# Patient Record
Sex: FEMALE | Race: BLACK OR AFRICAN AMERICAN | Employment: OTHER | URBAN - METROPOLITAN AREA
[De-identification: names, ages, dates, MRNs, and addresses within clinical notes are randomized per-mention and may not be internally consistent; named-entity substitution may affect disease eponyms.]

---

## 2022-04-07 ENCOUNTER — HOSPITAL ENCOUNTER (EMERGENCY)
Age: 77
Discharge: HOME | End: 2022-04-07
Attending: EMERGENCY MEDICINE
Payer: MEDICARE

## 2022-04-07 VITALS
DIASTOLIC BLOOD PRESSURE: 71 MMHG | HEIGHT: 62 IN | RESPIRATION RATE: 18 BRPM | TEMPERATURE: 99.1 F | HEART RATE: 62 BPM | OXYGEN SATURATION: 97 % | SYSTOLIC BLOOD PRESSURE: 126 MMHG | BODY MASS INDEX: 32.2 KG/M2 | WEIGHT: 175 LBS

## 2022-04-07 DIAGNOSIS — N30.01 ACUTE CYSTITIS WITH HEMATURIA: Primary | ICD-10-CM

## 2022-04-07 LAB
APPEARANCE UR: ABNORMAL
BACTERIA URNS QL MICRO: ABNORMAL /HPF
BILIRUB UR QL: NEGATIVE
CASTS URNS QL MICRO: 0 /LPF
COLOR UR: YELLOW
CRYSTALS URNS QL MICRO: 0 /LPF
EPI CELLS #/AREA URNS HPF: ABNORMAL /HPF
GLUCOSE UR STRIP.AUTO-MCNC: NEGATIVE MG/DL
HGB UR QL STRIP: ABNORMAL
KETONES UR QL STRIP.AUTO: NEGATIVE MG/DL
LEUKOCYTE ESTERASE UR QL STRIP.AUTO: ABNORMAL
MUCOUS THREADS URNS QL MICRO: 0 /LPF
NITRITE UR QL STRIP.AUTO: NEGATIVE
PH UR STRIP: 7.5 [PH] (ref 5–9)
PROT UR STRIP-MCNC: 100 MG/DL
RBC #/AREA URNS HPF: ABNORMAL /HPF
SP GR UR REFRACTOMETRY: 1.01 (ref 1–1.02)
UROBILINOGEN UR QL STRIP.AUTO: 1 EU/DL (ref 0.2–1)
WBC URNS QL MICRO: >100 /HPF

## 2022-04-07 PROCEDURE — 87186 SC STD MICRODIL/AGAR DIL: CPT

## 2022-04-07 PROCEDURE — 81015 MICROSCOPIC EXAM OF URINE: CPT

## 2022-04-07 PROCEDURE — 99283 EMERGENCY DEPT VISIT LOW MDM: CPT

## 2022-04-07 PROCEDURE — 81001 URINALYSIS AUTO W/SCOPE: CPT

## 2022-04-07 PROCEDURE — 87086 URINE CULTURE/COLONY COUNT: CPT

## 2022-04-07 PROCEDURE — 87088 URINE BACTERIA CULTURE: CPT

## 2022-04-07 RX ORDER — CEFDINIR 300 MG/1
300 CAPSULE ORAL 2 TIMES DAILY
Qty: 14 CAPSULE | Refills: 0 | Status: SHIPPED | OUTPATIENT
Start: 2022-04-07 | End: 2022-04-14

## 2022-04-07 NOTE — ED TRIAGE NOTES
Per daughter pt has had blood and clots in urine since last night. Pt denies any burning with urination, no abd pain, and no pelvic pain.

## 2022-04-07 NOTE — ED PROVIDER NOTES
72-year-old lady presents with concerns about blood in her urine. Patient says she does not have any significant pain in her abdomen or pelvis and no significant pain with urination. She denies any vaginal or rectal bleeding. She has no fevers or chills and no nausea, vomiting, or diarrhea. Daughter says the patient does have a history of some prior UTIs. No other associated symptoms. Elements of this note were created using speech recognition software. As such, errors of speech recognition may be present. Past Medical History:   Diagnosis Date    CAD (coronary artery disease)     Diabetes ([address]     Hypertension     Other ill-defined conditions(799.89)     choelsterol    Stroke [facility])        Past Surgical History:   Procedure Laterality Date    DE CARDIAC SURG PROCEDURE UNLIST      stented - doesn't know date         History reviewed. No pertinent family history. Social History     Socioeconomic History    Marital status: LEGALLY      Spouse name: Not on file    Number of children: Not on file    Years of education: Not on file    Highest education level: Not on file   Occupational History    Not on file   Tobacco Use    Smoking status: Not on file    Smokeless tobacco: Not on file   Substance and Sexual Activity    Alcohol use: Not on file    Drug use: Not on file    Sexual activity: Not on file   Other Topics Concern    Not on file   Social History Narrative    Not on file     Social Determinants of Health     Financial Resource Strain:     Difficulty of Paying Living Expenses: Not on file   Food Insecurity:     Worried About Running Out of Food in the Last Year: Not on file    [place] of Food in the Last Year: Not on file   Transportation Needs:     Lack of Transportation (Medical): Not on file    Lack of Transportation (Non-Medical):  Not on file   Physical Activity:     Days of Exercise per Week: Not on file    Minutes of Exercise per Session: Not on file   Stress:     Feeling of Stress : Not on file   Social Connections:     Frequency of Communication with Friends and Family: Not on file    Frequency of Social Gatherings with Friends and Family: Not on file    Attends Zoroastrian Services: Not on file    Active Member of Clubs or Organizations: Not on file    Attends Club or Organization Meetings: Not on file    Marital Status: Not on file   Intimate Partner Violence:     Fear of Current or Ex-Partner: Not on file    Emotionally Abused: Not on file    Physically Abused: Not on file    Sexually Abused: Not on file   Housing Stability:     Unable to Pay for Housing in the Last Year: Not on file    Number of [place] in the Last Year: Not on file    Unstable Housing in the Last Year: Not on file         ALLERGIES: Codeine and Sulfa (sulfonamide antibiotics)    Review of Systems   Constitutional: Negative for chills and fever. Gastrointestinal: Negative for anal bleeding, diarrhea, nausea and vomiting. Genitourinary: Positive for hematuria. Negative for difficulty urinating and dysuria. Musculoskeletal: Negative for back pain. Psychiatric/Behavioral: Negative for agitation and confusion. Vitals:    04/07/22 1537   BP: 126/71   Pulse: 62   Resp: 18   Temp: 99.1 °F (37.3 °C)   SpO2: 97%   Weight: 79.4 kg (175 lb)   Height: 5' 2\" (1.575 m)            Physical Exam  Vitals and nursing note reviewed. Constitutional:       Appearance: Normal appearance. Abdominal:      General: Bowel sounds are normal.      Palpations: Abdomen is soft. Tenderness: There is no guarding or rebound. Neurological:      General: No focal deficit present. Mental Status: She is alert and oriented to person, place, and time. Mental status is at baseline. [facility]  ED Course as of 04/07/22 1638   Thu Apr 07, 2022   1636 Patient's urine does show signs of likely infection. I will treat with antibiotics and send for urine culture.   I will encourage her to follow-up with her primary care doctor for reevaluation next week.  [AC]      ED Course User Index  [AC] Jeremías Perez MD       Procedures

## 2022-04-07 NOTE — DISCHARGE INSTRUCTIONS
Return with any fevers, worsening bleeding, vomiting, or additional concerns. Follow-up with your primary care doctor in 5 to 7 days for reevaluation.

## 2022-04-10 LAB
BACTERIA SPEC CULT: ABNORMAL
BACTERIA SPEC CULT: ABNORMAL
SERVICE CMNT-IMP: ABNORMAL

## 2022-04-10 NOTE — PROGRESS NOTES
ED pharmacist reviewed recent results of urine culture. The patient was not treated with antibiotics in the emergency department and received a prescription for cefdinir 300mg BID x7d upon discharge. Based on culture results, the patient is being adequately treated for the identified infection with existing antimicrobial therapy. No further intervention needed.     Allergies as of 04/07/2022 - Fully Reviewed 04/07/2022   Allergen Reaction Noted    Codeine Nausea and Vomiting 11/29/2010    Sulfa (sulfonamide antibiotics) Nausea and Vomiting 11/29/2010     Alondra Fuentes, [place]  Emergency Medicine Clinical Pharmacist

## 2022-05-28 ENCOUNTER — HOSPITAL ENCOUNTER (EMERGENCY)
Age: 77
Discharge: HOME | End: 2022-05-28
Attending: EMERGENCY MEDICINE
Payer: MEDICARE

## 2022-05-28 ENCOUNTER — HOSPITAL ENCOUNTER (EMERGENCY)
Dept: CT IMAGING | Age: 77
Discharge: HOME | End: 2022-05-31
Payer: MEDICARE

## 2022-05-28 VITALS
HEART RATE: 75 BPM | DIASTOLIC BLOOD PRESSURE: 61 MMHG | HEIGHT: 62 IN | RESPIRATION RATE: 18 BRPM | WEIGHT: 190 LBS | SYSTOLIC BLOOD PRESSURE: 124 MMHG | BODY MASS INDEX: 34.96 KG/M2 | TEMPERATURE: 98.8 F | OXYGEN SATURATION: 95 %

## 2022-05-28 DIAGNOSIS — K59.00 CONSTIPATION, UNSPECIFIED CONSTIPATION TYPE: ICD-10-CM

## 2022-05-28 DIAGNOSIS — R10.84 GENERALIZED ABDOMINAL PAIN: Primary | ICD-10-CM

## 2022-05-28 LAB
ALBUMIN SERPL-MCNC: 2.5 G/DL (ref 3.2–4.6)
ALBUMIN/GLOB SERPL: 0.5 {RATIO} (ref 1.2–3.5)
ALP SERPL-CCNC: 55 U/L (ref 50–130)
ALT SERPL-CCNC: 36 U/L (ref 12–65)
ANION GAP SERPL CALC-SCNC: 8 MMOL/L (ref 7–16)
AST SERPL-CCNC: 35 U/L (ref 15–37)
BASOPHILS # BLD: 0.1 K/UL (ref 0–0.2)
BASOPHILS NFR BLD: 1 % (ref 0–2)
BILIRUB SERPL-MCNC: 0.9 MG/DL (ref 0.2–1.1)
BUN SERPL-MCNC: 26 MG/DL (ref 8–23)
CALCIUM SERPL-MCNC: 8.7 MG/DL (ref 8.3–10.4)
CHLORIDE SERPL-SCNC: 104 MMOL/L (ref 98–107)
CO2 SERPL-SCNC: 28 MMOL/L (ref 21–32)
CREAT SERPL-MCNC: 1.74 MG/DL (ref 0.6–1)
DIFFERENTIAL METHOD BLD: ABNORMAL
EOSINOPHIL # BLD: 0.4 K/UL (ref 0–0.8)
EOSINOPHIL NFR BLD: 4 % (ref 0.5–7.8)
ERYTHROCYTE [DISTWIDTH] IN BLOOD BY AUTOMATED COUNT: 14.5 % (ref 11.9–14.6)
GLOBULIN SER CALC-MCNC: 5.3 G/DL (ref 2.3–3.5)
GLUCOSE SERPL-MCNC: 153 MG/DL (ref 65–100)
HCT VFR BLD AUTO: 29.9 % (ref 35.8–46.3)
HGB BLD-MCNC: 9.3 G/DL (ref 11.7–15.4)
IMM GRANULOCYTES # BLD AUTO: 0 K/UL (ref 0–0.5)
IMM GRANULOCYTES NFR BLD AUTO: 0 % (ref 0–5)
LIPASE SERPL-CCNC: 214 U/L (ref 73–393)
LYMPHOCYTES # BLD: 3.4 K/UL (ref 0.5–4.6)
LYMPHOCYTES NFR BLD: 39 % (ref 13–44)
MCH RBC QN AUTO: 27.4 PG (ref 26.1–32.9)
MCHC RBC AUTO-ENTMCNC: 31.1 G/DL (ref 31.4–35)
MCV RBC AUTO: 88.2 FL (ref 79.6–97.8)
MONOCYTES # BLD: 0.9 K/UL (ref 0.1–1.3)
MONOCYTES NFR BLD: 10 % (ref 4–12)
NEUTS SEG # BLD: 4.1 K/UL (ref 1.7–8.2)
NEUTS SEG NFR BLD: 46 % (ref 43–78)
NRBC # BLD: 0 K/UL (ref 0–0.2)
PLATELET # BLD AUTO: 337 K/UL (ref 150–450)
PMV BLD AUTO: 8.4 FL (ref 9.4–12.3)
POTASSIUM SERPL-SCNC: 3.7 MMOL/L (ref 3.5–5.1)
PROT SERPL-MCNC: 7.8 G/DL (ref 6.3–8.2)
RBC # BLD AUTO: 3.39 M/UL (ref 4.05–5.2)
SODIUM SERPL-SCNC: 140 MMOL/L (ref 136–145)
WBC # BLD AUTO: 8.9 K/UL (ref 4.3–11.1)

## 2022-05-28 PROCEDURE — 83690 ASSAY OF LIPASE: CPT

## 2022-05-28 PROCEDURE — 96374 THER/PROPH/DIAG INJ IV PUSH: CPT

## 2022-05-28 PROCEDURE — 80053 COMPREHEN METABOLIC PANEL: CPT

## 2022-05-28 PROCEDURE — 99284 EMERGENCY DEPT VISIT MOD MDM: CPT

## 2022-05-28 PROCEDURE — 6370000000 HC RX 637 (ALT 250 FOR IP): Performed by: PHYSICIAN ASSISTANT

## 2022-05-28 PROCEDURE — 6360000002 HC RX W HCPCS: Performed by: PHYSICIAN ASSISTANT

## 2022-05-28 PROCEDURE — 2580000003 HC RX 258: Performed by: PHYSICIAN ASSISTANT

## 2022-05-28 PROCEDURE — 85025 COMPLETE CBC W/AUTO DIFF WBC: CPT

## 2022-05-28 PROCEDURE — 6360000004 HC RX CONTRAST MEDICATION: Performed by: PHYSICIAN ASSISTANT

## 2022-05-28 RX ORDER — LISINOPRIL 40 MG/1
40 TABLET ORAL DAILY
Status: ON HOLD | COMMUNITY
End: 2022-06-22 | Stop reason: HOSPADM

## 2022-05-28 RX ORDER — SIMVASTATIN 80 MG
80 TABLET ORAL
Status: ON HOLD | COMMUNITY
End: 2022-06-22 | Stop reason: HOSPADM

## 2022-05-28 RX ORDER — ATORVASTATIN CALCIUM 40 MG/1
TABLET, FILM COATED ORAL
COMMUNITY

## 2022-05-28 RX ORDER — ONDANSETRON 4 MG/1
4 TABLET, ORALLY DISINTEGRATING ORAL 3 TIMES DAILY PRN
COMMUNITY
Start: 2020-07-21

## 2022-05-28 RX ORDER — CIPROFLOXACIN 250 MG/1
TABLET, FILM COATED ORAL
Status: ON HOLD | COMMUNITY
End: 2022-06-22 | Stop reason: HOSPADM

## 2022-05-28 RX ORDER — LOVASTATIN 40 MG/1
TABLET ORAL
Status: ON HOLD | COMMUNITY
End: 2022-06-22 | Stop reason: HOSPADM

## 2022-05-28 RX ORDER — 0.9 % SODIUM CHLORIDE 0.9 %
500 INTRAVENOUS SOLUTION INTRAVENOUS
Status: COMPLETED | OUTPATIENT
Start: 2022-05-28 | End: 2022-05-28

## 2022-05-28 RX ORDER — HYDROCODONE BITARTRATE AND HOMATROPINE METHYLBROMIDE ORAL SOLUTION 5; 1.5 MG/5ML; MG/5ML
5 LIQUID ORAL 4 TIMES DAILY PRN
COMMUNITY
Start: 2011-10-19

## 2022-05-28 RX ORDER — CLOPIDOGREL BISULFATE 75 MG/1
TABLET ORAL
COMMUNITY

## 2022-05-28 RX ORDER — METHYLPREDNISOLONE 4 MG/1
TABLET ORAL
Status: ON HOLD | COMMUNITY
Start: 2011-10-19 | End: 2022-06-22 | Stop reason: HOSPADM

## 2022-05-28 RX ORDER — AMLODIPINE BESYLATE 5 MG/1
5 TABLET ORAL DAILY
COMMUNITY

## 2022-05-28 RX ORDER — INSULIN GLARGINE 100 [IU]/ML
13 INJECTION, SOLUTION SUBCUTANEOUS
COMMUNITY
Start: 2020-07-03

## 2022-05-28 RX ORDER — LORATADINE 10 MG/1
TABLET ORAL
COMMUNITY

## 2022-05-28 RX ORDER — SODIUM CHLORIDE 0.9 % (FLUSH) 0.9 %
3 SYRINGE (ML) INJECTION EVERY 8 HOURS
Status: DISCONTINUED | OUTPATIENT
Start: 2022-05-28 | End: 2022-05-28 | Stop reason: HOSPADM

## 2022-05-28 RX ORDER — FLUCONAZOLE 150 MG/1
TABLET ORAL
Status: ON HOLD | COMMUNITY
End: 2022-06-22 | Stop reason: HOSPADM

## 2022-05-28 RX ORDER — METOPROLOL TARTRATE 100 MG/1
100 TABLET ORAL 2 TIMES DAILY
Status: ON HOLD | COMMUNITY
End: 2022-06-22 | Stop reason: HOSPADM

## 2022-05-28 RX ORDER — GLYBURIDE 5 MG/1
15 TABLET ORAL
COMMUNITY
End: 2022-05-28

## 2022-05-28 RX ORDER — LOSARTAN POTASSIUM 50 MG/1
TABLET ORAL
COMMUNITY

## 2022-05-28 RX ORDER — ONDANSETRON 2 MG/ML
4 INJECTION INTRAMUSCULAR; INTRAVENOUS
Status: COMPLETED | OUTPATIENT
Start: 2022-05-28 | End: 2022-05-28

## 2022-05-28 RX ORDER — POLYETHYLENE GLYCOL 3350 17 G/17G
17 POWDER, FOR SOLUTION ORAL DAILY
Qty: 765 G | Refills: 0 | Status: SHIPPED | OUTPATIENT
Start: 2022-05-28 | End: 2022-06-04

## 2022-05-28 RX ORDER — ASPIRIN 81 MG/1
81 TABLET ORAL 2 TIMES DAILY
COMMUNITY
Start: 2021-05-18

## 2022-05-28 RX ORDER — HYDROCHLOROTHIAZIDE 25 MG/1
TABLET ORAL
COMMUNITY

## 2022-05-28 RX ORDER — CARVEDILOL 6.25 MG/1
TABLET ORAL
COMMUNITY
Start: 2020-07-03

## 2022-05-28 RX ORDER — ATORVASTATIN CALCIUM 80 MG/1
TABLET, FILM COATED ORAL
COMMUNITY

## 2022-05-28 RX ORDER — ACETAMINOPHEN 500 MG
500 TABLET ORAL EVERY 6 HOURS PRN
COMMUNITY
Start: 2020-07-03

## 2022-05-28 RX ADMIN — DIATRIZOATE MEGLUMINE AND DIATRIZOATE SODIUM 15 ML: 660; 100 LIQUID ORAL; RECTAL at 13:41

## 2022-05-28 RX ADMIN — HYOSCYAMINE SULFATE 250 MCG: 0.12 TABLET ORAL; SUBLINGUAL at 11:29

## 2022-05-28 RX ADMIN — SODIUM CHLORIDE 500 ML: 900 INJECTION, SOLUTION INTRAVENOUS at 12:13

## 2022-05-28 RX ADMIN — ONDANSETRON 4 MG: 2 INJECTION INTRAMUSCULAR; INTRAVENOUS at 11:54

## 2022-05-28 ASSESSMENT — PAIN - FUNCTIONAL ASSESSMENT: PAIN_FUNCTIONAL_ASSESSMENT: NONE - DENIES PAIN

## 2022-05-28 NOTE — ED NOTES
I have reviewed discharge instructions with the patient. The patient verbalized understanding. Patient left ED via Discharge Method: ambulatory to Home with family  Opportunity for questions and clarification provided. Patient given 0 scripts. To continue your aftercare when you leave the hospital, you may receive an automated call from our care team to check in on how you are doing. This is a free service and part of our promise to provide the best care and service to meet your aftercare needs.  If you have questions, or wish to unsubscribe from this service please call [phone]. Thank you for Choosing our [facility] Emergency Department.       Arnulfo Hughes RN  05/28/22 8831

## 2022-05-28 NOTE — ED NOTES
I have reviewed discharge instructions with the patient and daughter. The daughter and patient verbalized understanding. Patient left ED via private vehicle. Discharge Method: wheelchair to Home with daughter. Opportunity for questions and clarification provided. Patient given 1 scripts. To continue your aftercare when you leave the hospital, you may receive an automated call from our care team to check in on how you are doing. This is a free service and part of our promise to provide the best care and service to meet your aftercare needs.  If you have questions, or wish to unsubscribe from this service please call [phone]. Thank you for Choosing our [facility] Emergency Department.       Macey Mojica RN  05/28/22 6330

## 2022-05-28 NOTE — ED TRIAGE NOTES
Patient brought to the ER by daughter. Patient c\o constipation. Last bowel movement was 5/25. Daughter states that patient was vomiting and lower abdominal pain.  Denies fever

## 2022-05-28 NOTE — ED PROVIDER NOTES
[facility] Emergency Department Provider Note                     PCP:                Quintin Alvarado DO               Age: 78 y.o. Sex: female           ICD-10-CM    1. Generalized abdominal pain  R10.84    2. Constipation, unspecified constipation type  K59.00        DISCHARGED    MDM  Number of Diagnoses or Management Options  Constipation, unspecified constipation type  Generalized abdominal pain  Diagnosis management comments: Patient is a 79-year-old female who presented to the facility today with complaint of constipation and abdominal pain. On exam patient is well-appearing in no acute distress. Vital signs are stable. Physical exam was largely unremarkable with no abdominal tenderness to palpation diffusely throughout the abdomen. Basic labs in the department continue to show trending kidney disease as well as an H&H of 9.3 and 29.9 respectively. We tried to give the patient an enema in the department while awaiting to obtain a CT and the patient did not tolerate well getting minimal amount of the enema successfully into the rectum. When I went back to talk to the patient regarding CT scan she indicated she no longer wanted anything done and did not want the CT scan. I discussed my concern that she had not had a bowel movement in 4 days and that it may be necessary to do a rectal exam to see if she is impacted which she refused. I discussed the concern that if she was obstructed or impacted that things may continue to get worse and there were significant potential risks (I.e. bowel perforation) to not resolving and/or finding the problem. Patient reports understanding but still refuses to have anything further done here in the department and would like to go home. As the patient was getting ready for discharge the nurse came and inform me that she had had a significant bowel movement all over herself in the bed and the exam room.   I have sent the patient home with a prescription for MiraLAX and instructed her how to take it until she gets continuous bowel movements. Discussed return protocols with the patient which she reports understanding. Family no longer in the room to review this with them. Patient was in stable condition upon discharge. Voice dictation software was used during the making of this note. This software is not perfect and grammatical and other typographical errors may be present. This note has been proofread, but may still contain errors. Chidi Candelario PA-C; 5/29/2022 @8:31 AM   ===================================================================         Amount and/or Complexity of Data Reviewed  Clinical lab tests: reviewed and ordered  Tests in the medicine section of CPT®: ordered and reviewed  Review and summarize past medical records: yes  Independent visualization of images, tracings, or specimens: yes    Risk of Complications, Morbidity, and/or Mortality  Presenting problems: moderate  Diagnostic procedures: moderate  Management options: low  General comments: The patient was observed in the ED.     Results Reviewed:      Recent Results (from the past 24 hour(s))  -CBC with Diff:   Collection Time: 05/28/22 11:28 AM       Result                      Value             Ref Range           WBC                         8.9               4.3 - 11.1 K*       RBC                         3.39 (L)          4.05 - 5.2 M*       Hemoglobin                  9.3 (L)           11.7 - 15.4 *       Hematocrit                  29.9 (L)          35.8 - 46.3 %       MCV                         88.2              79.6 - 97.8 *       MCH                         27.4              26.1 - 32.9 *       MCHC                        31.1 (L)          31.4 - 35.0 *       RDW                         14.5              11.9 - 14.6 %       Platelets                   337               150 - 450 K/*       MPV                         8.4 (L)           9.4 - 12.3 FL       nRBC 0.00              0.0 - 0.2 K/*       Differential Type           AUTOMATED                             Seg Neutrophils             46                43 - 78 %           Lymphocytes                 39                13 - 44 %           Monocytes                   10                4.0 - 12.0 %        Eosinophils %               4                 0.5 - 7.8 %         Basophils                   1                 0.0 - 2.0 %         Immature Granulocytes       0                 0.0 - 5.0 %         Segs Absolute               4.1               1.7 - 8.2 K/*       Absolute Lymph #            3.4               0.5 - 4.6 K/*       Absolute Mono #             0.9               0.1 - 1.3 K/*       Absolute Eos #              0.4               0.0 - 0.8 K/*       Basophils Absolute          0.1               0.0 - 0.2 K/*       Absolute Immature Gran*     0.0               0.0 - 0.5 K/*  -CMP:   Collection Time: 05/28/22 11:28 AM       Result                      Value             Ref Range           Sodium                      140               136 - 145 mm*       Potassium                   3.7               3.5 - 5.1 mm*       Chloride                    104               98 - 107 mmo*       CO2                         28                21 - 32 mmol*       Anion Gap                   8                 7 - 16 mmol/L       Glucose                     153 (H)           65 - 100 mg/*       BUN                         26 (H)            8 - 23 MG/DL        CREATININE                  1. 74 (H)          0.6 - 1.0 MG*       GFR         37 (L)            >60 ml/min/1*       GFR Non- Americ*     30 (L)            >60 ml/min/1*       Calcium                     8.7               8.3 - 10.4 M*       Total Bilirubin             0.9               0.2 - 1.1 MG*       ALT                         36                12 - 65 U/L         AST                         35                15 - 37 U/L         Alk Phosphatase 55                50 - 130 U/L        Total Protein               7.8               6.3 - 8.2 g/*       Albumin                     2.5 (L)           3.2 - 4.6 g/*       Globulin                    5.3 (H)           2.3 - 3.5 g/*       Albumin/Globulin Ratio      0.5 (L)           1.2 - 3.5      -Lipase:   Collection Time: 05/28/22 11:28 AM       Result                      Value             Ref Range           Lipase                      214               73 - 393 U/L     I discussed the results of all labs, procedures, radiographs, and treatments with the patient and available family. Treatment plan is agreed upon and the patient is ready for discharge. All voiced understanding of the discharge plan and medication instructions or changes as appropriate. Questions about treatment in the ED were answered. All were encouraged to return should symptoms worsen or new problems develop. Patient Progress  Patient progress: stable      Orders Placed This Encounter   Procedures    CT ABDOMEN PELVIS WO CONTRAST Additional Contrast? None    CBC with Diff    CMP    Lipase    Diet NPO    POCT Urine Dipstick    ENEMA MILK AND MOLASSES    Saline lock IV        Marlien Britton is a 78 y.o. female who presents to the Emergency Department with chief complaint of    Chief Complaint   Patient presents with    Abdominal Pain      Patient is a 79-year-old female with history of diabetes and chronic kidney disease who presented to the facility today with complaint of constipation since Wednesday. Patient reports she has really been able to have no produce stool at all even small amounts the last few days. She states she has been eating and drinking normally. Daughter in the room reports she was complaining of some abdominal discomfort and that in combination with the constipation is why she brought her in today.   Daughter reports on the way to the emergency room patient had a single episode of vomiting which was about 2 and half hours removed from when she ate breakfast.  Patient denies any fevers, chills, body aches, chest pain, cough, nausea, dysuria, frequency, flank pain, or any other symptoms. The history is provided by the patient. Review of Systems   Constitutional: Negative for chills and fever. Respiratory: Negative for cough and shortness of breath. Cardiovascular: Negative for chest pain. Gastrointestinal: Positive for constipation and vomiting. Negative for abdominal pain and nausea. Genitourinary: Negative for dysuria, frequency and urgency. Musculoskeletal: Negative for back pain and gait problem. Skin: Negative for rash. Neurological: Negative for dizziness, syncope and headaches. Psychiatric/Behavioral: Negative for agitation and behavioral problems. All other systems reviewed and are negative. All other systems reviewed and are negative. @[email or web address]@     @[email or web address]@    @[email or web address]@        Social Connections:     Frequency of Communication with Friends and Family: Not on file    Frequency of Social Gatherings with Friends and Family: Not on file    Attends Roman Catholic Services: Not on file    Active Member of Clubs or Organizations: Not on file    Attends Club or Organization Meetings: Not on file    Marital Status: Not on file        Allergies   Allergen Reactions    Codeine Nausea And Vomiting    Sulfa Antibiotics Nausea And Vomiting and Hives     Daughter reports but unsure of reaction          Vitals signs and nursing note reviewed. Patient Vitals for the past 4 hrs:   Temp Pulse Resp BP SpO2   05/28/22 1115 98.8 °F (37.1 °C) 72 19 123/80 97 %          Physical Exam  Vitals and nursing note reviewed. Constitutional:       General: She is not in acute distress. Appearance: She is well-developed. She is not ill-appearing or toxic-appearing. HENT:      Head: Normocephalic and atraumatic.    Cardiovascular:      Rate and Rhythm: Normal rate and

## 2022-05-29 ASSESSMENT — ENCOUNTER SYMPTOMS
CONSTIPATION: 1
VOMITING: 1
ABDOMINAL PAIN: 0
SHORTNESS OF BREATH: 0
COUGH: 0
NAUSEA: 0
BACK PAIN: 0

## 2022-06-18 ENCOUNTER — HOSPITAL ENCOUNTER (EMERGENCY)
Dept: GENERAL RADIOLOGY | Age: 77
Discharge: HOME | DRG: 871 | End: 2022-06-21
Payer: MEDICARE

## 2022-06-18 ENCOUNTER — HOSPITAL ENCOUNTER (INPATIENT)
Age: 77
LOS: 4 days | Discharge: HOME | DRG: 871 | End: 2022-06-22
Attending: EMERGENCY MEDICINE | Admitting: FAMILY MEDICINE
Payer: MEDICARE

## 2022-06-18 DIAGNOSIS — N39.0 URINARY TRACT INFECTION WITH HEMATURIA, SITE UNSPECIFIED: Primary | ICD-10-CM

## 2022-06-18 DIAGNOSIS — R40.4 TRANSIENT ALTERATION OF AWARENESS: ICD-10-CM

## 2022-06-18 DIAGNOSIS — E86.0 DEHYDRATION: ICD-10-CM

## 2022-06-18 DIAGNOSIS — R31.9 URINARY TRACT INFECTION WITH HEMATURIA, SITE UNSPECIFIED: Primary | ICD-10-CM

## 2022-06-18 DIAGNOSIS — R53.1 GENERALIZED WEAKNESS: ICD-10-CM

## 2022-06-18 DIAGNOSIS — N17.9 ACUTE KIDNEY INJURY SUPERIMPOSED ON CHRONIC KIDNEY DISEASE (HCC): ICD-10-CM

## 2022-06-18 DIAGNOSIS — N18.9 ACUTE KIDNEY INJURY SUPERIMPOSED ON CHRONIC KIDNEY DISEASE (HCC): ICD-10-CM

## 2022-06-18 PROBLEM — D64.9 NORMOCYTIC ANEMIA: Status: ACTIVE | Noted: 2022-06-18

## 2022-06-18 LAB
ALBUMIN SERPL-MCNC: 2.3 G/DL (ref 3.2–4.6)
ALBUMIN/GLOB SERPL: 0.4 {RATIO} (ref 1.2–3.5)
ALP SERPL-CCNC: 53 U/L (ref 50–136)
ALT SERPL-CCNC: 54 U/L (ref 12–65)
ANION GAP SERPL CALC-SCNC: 6 MMOL/L (ref 7–16)
APPEARANCE UR: ABNORMAL
AST SERPL-CCNC: 46 U/L (ref 15–37)
BACTERIA URNS QL MICRO: ABNORMAL /HPF
BASOPHILS # BLD: 0.1 K/UL (ref 0–0.2)
BASOPHILS NFR BLD: 1 % (ref 0–2)
BILIRUB SERPL-MCNC: 0.6 MG/DL (ref 0.2–1.1)
BILIRUB UR QL: NEGATIVE
BUN SERPL-MCNC: 43 MG/DL (ref 8–23)
CALCIUM SERPL-MCNC: 8.7 MG/DL (ref 8.3–10.4)
CHLORIDE SERPL-SCNC: 107 MMOL/L (ref 98–107)
CO2 SERPL-SCNC: 25 MMOL/L (ref 21–32)
COLOR UR: ABNORMAL
CREAT SERPL-MCNC: 1.98 MG/DL (ref 0.6–1)
DIFFERENTIAL METHOD BLD: ABNORMAL
EKG ATRIAL RATE: 67 BPM
EKG DIAGNOSIS: NORMAL
EKG P AXIS: 46 DEGREES
EKG P-R INTERVAL: 174 MS
EKG Q-T INTERVAL: 418 MS
EKG QRS DURATION: 92 MS
EKG QTC CALCULATION (BAZETT): 441 MS
EKG R AXIS: -4 DEGREES
EKG T AXIS: -3 DEGREES
EKG VENTRICULAR RATE: 67 BPM
EOSINOPHIL # BLD: 0.2 K/UL (ref 0–0.8)
EOSINOPHIL NFR BLD: 3 % (ref 0.5–7.8)
EPI CELLS #/AREA URNS HPF: ABNORMAL /HPF
ERYTHROCYTE [DISTWIDTH] IN BLOOD BY AUTOMATED COUNT: 15.1 % (ref 11.9–14.6)
GLOBULIN SER CALC-MCNC: 5.2 G/DL (ref 2.3–3.5)
GLUCOSE SERPL-MCNC: 165 MG/DL (ref 65–100)
GLUCOSE UR STRIP.AUTO-MCNC: NEGATIVE MG/DL
HCT VFR BLD AUTO: 29.9 % (ref 35.8–46.3)
HGB BLD-MCNC: 9.3 G/DL (ref 11.7–15.4)
HGB UR QL STRIP: ABNORMAL
IMM GRANULOCYTES # BLD AUTO: 0 K/UL (ref 0–0.5)
IMM GRANULOCYTES NFR BLD AUTO: 0 % (ref 0–5)
KETONES UR QL STRIP.AUTO: NEGATIVE MG/DL
LACTATE SERPL-SCNC: 1 MMOL/L (ref 0.4–2)
LEUKOCYTE ESTERASE UR QL STRIP.AUTO: ABNORMAL
LYMPHOCYTES # BLD: 5.1 K/UL (ref 0.5–4.6)
LYMPHOCYTES NFR BLD: 52 % (ref 13–44)
MCH RBC QN AUTO: 27.3 PG (ref 26.1–32.9)
MCHC RBC AUTO-ENTMCNC: 31.1 G/DL (ref 31.4–35)
MCV RBC AUTO: 87.7 FL (ref 79.6–97.8)
MONOCYTES # BLD: 1.2 K/UL (ref 0.1–1.3)
MONOCYTES NFR BLD: 13 % (ref 4–12)
NEUTS SEG # BLD: 3.1 K/UL (ref 1.7–8.2)
NEUTS SEG NFR BLD: 32 % (ref 43–78)
NITRITE UR QL STRIP.AUTO: NEGATIVE
NRBC # BLD: 0 K/UL (ref 0–0.2)
OTHER OBSERVATIONS: ABNORMAL
PH UR STRIP: 7.5 [PH] (ref 5–9)
PLATELET # BLD AUTO: 305 K/UL (ref 150–450)
PMV BLD AUTO: 8.6 FL (ref 9.4–12.3)
POTASSIUM SERPL-SCNC: 4.6 MMOL/L (ref 3.5–5.1)
PROT SERPL-MCNC: 7.5 G/DL (ref 6.3–8.2)
PROT UR STRIP-MCNC: 300 MG/DL
RBC # BLD AUTO: 3.41 M/UL (ref 4.05–5.2)
RBC #/AREA URNS HPF: >100 /HPF
SODIUM SERPL-SCNC: 138 MMOL/L (ref 136–145)
SP GR UR REFRACTOMETRY: 1.01 (ref 1–1.02)
UROBILINOGEN UR QL STRIP.AUTO: 0.2 EU/DL (ref 0.2–1)
WBC # BLD AUTO: 9.7 K/UL (ref 4.3–11.1)
WBC URNS QL MICRO: >100 /HPF

## 2022-06-18 PROCEDURE — 80053 COMPREHEN METABOLIC PANEL: CPT

## 2022-06-18 PROCEDURE — 81001 URINALYSIS AUTO W/SCOPE: CPT

## 2022-06-18 PROCEDURE — 1100000000 HC RM PRIVATE

## 2022-06-18 PROCEDURE — 83605 ASSAY OF LACTIC ACID: CPT

## 2022-06-18 PROCEDURE — 96375 TX/PRO/DX INJ NEW DRUG ADDON: CPT

## 2022-06-18 PROCEDURE — 94762 N-INVAS EAR/PLS OXIMTRY CONT: CPT

## 2022-06-18 PROCEDURE — 2580000003 HC RX 258: Performed by: EMERGENCY MEDICINE

## 2022-06-18 PROCEDURE — 87088 URINE BACTERIA CULTURE: CPT

## 2022-06-18 PROCEDURE — 96374 THER/PROPH/DIAG INJ IV PUSH: CPT

## 2022-06-18 PROCEDURE — 87186 SC STD MICRODIL/AGAR DIL: CPT

## 2022-06-18 PROCEDURE — 71045 X-RAY EXAM CHEST 1 VIEW: CPT

## 2022-06-18 PROCEDURE — 99285 EMERGENCY DEPT VISIT HI MDM: CPT

## 2022-06-18 PROCEDURE — 87205 SMEAR GRAM STAIN: CPT

## 2022-06-18 PROCEDURE — 93005 ELECTROCARDIOGRAM TRACING: CPT | Performed by: EMERGENCY MEDICINE

## 2022-06-18 PROCEDURE — 87086 URINE CULTURE/COLONY COUNT: CPT

## 2022-06-18 PROCEDURE — 6360000002 HC RX W HCPCS: Performed by: EMERGENCY MEDICINE

## 2022-06-18 PROCEDURE — 87040 BLOOD CULTURE FOR BACTERIA: CPT

## 2022-06-18 PROCEDURE — 96361 HYDRATE IV INFUSION ADD-ON: CPT

## 2022-06-18 PROCEDURE — 85025 COMPLETE CBC W/AUTO DIFF WBC: CPT

## 2022-06-18 RX ORDER — CLOPIDOGREL BISULFATE 75 MG/1
75 TABLET ORAL DAILY
Status: DISCONTINUED | OUTPATIENT
Start: 2022-06-19 | End: 2022-06-22 | Stop reason: HOSPADM

## 2022-06-18 RX ORDER — ATORVASTATIN CALCIUM 40 MG/1
40 TABLET, FILM COATED ORAL DAILY
Status: DISCONTINUED | OUTPATIENT
Start: 2022-06-19 | End: 2022-06-19 | Stop reason: SDUPTHER

## 2022-06-18 RX ORDER — HEPARIN SODIUM 5000 [USP'U]/ML
5000 INJECTION, SOLUTION INTRAVENOUS; SUBCUTANEOUS EVERY 8 HOURS SCHEDULED
Status: DISCONTINUED | OUTPATIENT
Start: 2022-06-19 | End: 2022-06-22 | Stop reason: HOSPADM

## 2022-06-18 RX ORDER — SODIUM CHLORIDE, SODIUM LACTATE, POTASSIUM CHLORIDE, AND CALCIUM CHLORIDE .6; .31; .03; .02 G/100ML; G/100ML; G/100ML; G/100ML
1000 INJECTION, SOLUTION INTRAVENOUS
Status: COMPLETED | OUTPATIENT
Start: 2022-06-18 | End: 2022-06-18

## 2022-06-18 RX ORDER — SODIUM CHLORIDE 9 MG/ML
INJECTION, SOLUTION INTRAVENOUS CONTINUOUS
Status: DISCONTINUED | OUTPATIENT
Start: 2022-06-19 | End: 2022-06-20

## 2022-06-18 RX ORDER — LISINOPRIL 20 MG/1
40 TABLET ORAL DAILY
Status: DISCONTINUED | OUTPATIENT
Start: 2022-06-19 | End: 2022-06-19

## 2022-06-18 RX ORDER — HYDROCODONE BITARTRATE AND HOMATROPINE METHYLBROMIDE ORAL SOLUTION 5; 1.5 MG/5ML; MG/5ML
5 LIQUID ORAL EVERY 4 HOURS PRN
Status: DISCONTINUED | OUTPATIENT
Start: 2022-06-18 | End: 2022-06-22 | Stop reason: HOSPADM

## 2022-06-18 RX ORDER — AMLODIPINE BESYLATE 5 MG/1
5 TABLET ORAL DAILY
Status: DISCONTINUED | OUTPATIENT
Start: 2022-06-19 | End: 2022-06-22 | Stop reason: HOSPADM

## 2022-06-18 RX ORDER — ACETAMINOPHEN 650 MG/1
650 SUPPOSITORY RECTAL EVERY 6 HOURS PRN
Status: DISCONTINUED | OUTPATIENT
Start: 2022-06-18 | End: 2022-06-22 | Stop reason: HOSPADM

## 2022-06-18 RX ORDER — CETIRIZINE HYDROCHLORIDE 10 MG/1
10 TABLET ORAL DAILY
Status: DISCONTINUED | OUTPATIENT
Start: 2022-06-19 | End: 2022-06-22 | Stop reason: HOSPADM

## 2022-06-18 RX ORDER — ENOXAPARIN SODIUM 100 MG/ML
30 INJECTION SUBCUTANEOUS DAILY
Status: DISCONTINUED | OUTPATIENT
Start: 2022-06-19 | End: 2022-06-18

## 2022-06-18 RX ORDER — SODIUM CHLORIDE 0.9 % (FLUSH) 0.9 %
5-40 SYRINGE (ML) INJECTION EVERY 12 HOURS SCHEDULED
Status: DISCONTINUED | OUTPATIENT
Start: 2022-06-19 | End: 2022-06-22 | Stop reason: HOSPADM

## 2022-06-18 RX ORDER — METOPROLOL TARTRATE 100 MG/1
100 TABLET ORAL 2 TIMES DAILY
Status: DISCONTINUED | OUTPATIENT
Start: 2022-06-19 | End: 2022-06-19

## 2022-06-18 RX ORDER — POLYETHYLENE GLYCOL 3350 17 G/17G
17 POWDER, FOR SOLUTION ORAL DAILY PRN
Status: DISCONTINUED | OUTPATIENT
Start: 2022-06-18 | End: 2022-06-22 | Stop reason: HOSPADM

## 2022-06-18 RX ORDER — DEXTROSE MONOHYDRATE 50 MG/ML
100 INJECTION, SOLUTION INTRAVENOUS PRN
Status: DISCONTINUED | OUTPATIENT
Start: 2022-06-18 | End: 2022-06-22 | Stop reason: HOSPADM

## 2022-06-18 RX ORDER — ATORVASTATIN CALCIUM 40 MG/1
80 TABLET, FILM COATED ORAL NIGHTLY
Status: DISCONTINUED | OUTPATIENT
Start: 2022-06-19 | End: 2022-06-22 | Stop reason: HOSPADM

## 2022-06-18 RX ORDER — ONDANSETRON 4 MG/1
4 TABLET, ORALLY DISINTEGRATING ORAL EVERY 8 HOURS PRN
Status: DISCONTINUED | OUTPATIENT
Start: 2022-06-18 | End: 2022-06-22 | Stop reason: HOSPADM

## 2022-06-18 RX ORDER — MORPHINE SULFATE 4 MG/ML
4 INJECTION INTRAVENOUS
Status: COMPLETED | OUTPATIENT
Start: 2022-06-18 | End: 2022-06-18

## 2022-06-18 RX ORDER — ASPIRIN 81 MG/1
81 TABLET ORAL 2 TIMES DAILY
Status: DISCONTINUED | OUTPATIENT
Start: 2022-06-19 | End: 2022-06-19

## 2022-06-18 RX ORDER — ONDANSETRON 2 MG/ML
4 INJECTION INTRAMUSCULAR; INTRAVENOUS
Status: COMPLETED | OUTPATIENT
Start: 2022-06-18 | End: 2022-06-18

## 2022-06-18 RX ORDER — ACETAMINOPHEN 325 MG/1
650 TABLET ORAL EVERY 6 HOURS PRN
Status: DISCONTINUED | OUTPATIENT
Start: 2022-06-18 | End: 2022-06-22 | Stop reason: HOSPADM

## 2022-06-18 RX ORDER — CARVEDILOL 6.25 MG/1
6.25 TABLET ORAL 2 TIMES DAILY WITH MEALS
Status: DISCONTINUED | OUTPATIENT
Start: 2022-06-19 | End: 2022-06-19

## 2022-06-18 RX ORDER — SODIUM CHLORIDE 0.9 % (FLUSH) 0.9 %
5-40 SYRINGE (ML) INJECTION PRN
Status: DISCONTINUED | OUTPATIENT
Start: 2022-06-18 | End: 2022-06-22 | Stop reason: HOSPADM

## 2022-06-18 RX ORDER — HYDROCHLOROTHIAZIDE 25 MG/1
25 TABLET ORAL DAILY
Status: DISCONTINUED | OUTPATIENT
Start: 2022-06-19 | End: 2022-06-19

## 2022-06-18 RX ORDER — INSULIN GLARGINE 100 [IU]/ML
13 INJECTION, SOLUTION SUBCUTANEOUS NIGHTLY
Status: DISCONTINUED | OUTPATIENT
Start: 2022-06-19 | End: 2022-06-22 | Stop reason: HOSPADM

## 2022-06-18 RX ORDER — ONDANSETRON 2 MG/ML
4 INJECTION INTRAMUSCULAR; INTRAVENOUS EVERY 6 HOURS PRN
Status: DISCONTINUED | OUTPATIENT
Start: 2022-06-18 | End: 2022-06-22 | Stop reason: HOSPADM

## 2022-06-18 RX ORDER — LOSARTAN POTASSIUM 50 MG/1
50 TABLET ORAL DAILY
Status: DISCONTINUED | OUTPATIENT
Start: 2022-06-19 | End: 2022-06-22 | Stop reason: HOSPADM

## 2022-06-18 RX ADMIN — ONDANSETRON 4 MG: 2 INJECTION INTRAMUSCULAR; INTRAVENOUS at 18:37

## 2022-06-18 RX ADMIN — CEFTRIAXONE 1000 MG: 1 INJECTION, POWDER, FOR SOLUTION INTRAMUSCULAR; INTRAVENOUS at 21:33

## 2022-06-18 RX ADMIN — SODIUM CHLORIDE, POTASSIUM CHLORIDE, SODIUM LACTATE AND CALCIUM CHLORIDE 1000 ML: 600; 310; 30; 20 INJECTION, SOLUTION INTRAVENOUS at 18:00

## 2022-06-18 RX ADMIN — MORPHINE SULFATE 4 MG: 4 INJECTION INTRAVENOUS at 18:38

## 2022-06-18 ASSESSMENT — ENCOUNTER SYMPTOMS
COUGH: 0
DIARRHEA: 0
HEMATOCHEZIA: 0
NAUSEA: 0
VISUAL CHANGE: 0
SHORTNESS OF BREATH: 0
VOMITING: 0
ABDOMINAL PAIN: 0

## 2022-06-18 ASSESSMENT — PAIN SCALES - GENERAL
PAINLEVEL_OUTOF10: 10
PAINLEVEL_OUTOF10: 0

## 2022-06-18 ASSESSMENT — PAIN DESCRIPTION - ORIENTATION: ORIENTATION: LEFT

## 2022-06-18 ASSESSMENT — PAIN - FUNCTIONAL ASSESSMENT: PAIN_FUNCTIONAL_ASSESSMENT: 0-10

## 2022-06-18 ASSESSMENT — PAIN DESCRIPTION - LOCATION: LOCATION: FOOT

## 2022-06-18 NOTE — ED PROVIDER NOTES
[facility] Emergency Department Provider Note                   PCP:                Alysa Redd DO               Age: 78 y.o. Sex: female       ICD-10-CM    1. Urinary tract infection with hematuria, site unspecified  N39.0     R31.9    2. Generalized weakness  R53.1    3. Dehydration  E86.0    4. Transient alteration of awareness  R40.4    5.  Acute kidney injury superimposed on chronic kidney disease ([facility] [address]  N17.9     N18.9        DISPOSITION Decision To Admit 06/18/2022 08:00:04 PM       New Prescriptions    No medications on file       Orders Placed This Encounter   Procedures    Culture, Blood 1    Culture, Blood 1    Culture, Urine    XR CHEST PORTABLE    CBC with Auto Differential    Comprehensive Metabolic Panel    Lactic Acid    Urinalysis w rflx microscopic    Cardiac Monitor - ED Only    Continuous Pulse Oximetry    EKG 12 Lead    Straight Catheter        Alisha Trotter MD, MD 8:00 PM      MDM  Number of Diagnoses or Management Options  Acute kidney injury superimposed on chronic kidney disease ([facility] [address]): new, needed workup  Dehydration: new, needed workup  Generalized weakness: new, needed workup  Transient alteration of awareness: new, needed workup  Urinary tract infection with hematuria, site unspecified: new, needed workup     Amount and/or Complexity of Data Reviewed  Clinical lab tests: ordered and reviewed  Tests in the medicine section of CPT®: ordered and reviewed  Review and summarize past medical records: yes    Risk of Complications, Morbidity, and/or Mortality  Presenting problems: moderate  Diagnostic procedures: moderate  Management options: moderate    Patient Progress  Patient progress: improved       Glen Lepe is a 78 y.o. female who presents to the Emergency Department with chief complaint of    Chief Complaint   Patient presents with    Fatigue      72-year-old black female with a history of CAD, diabetes, hypertension, hypercholesterolemia and CVA with slight right-sided residual weakness presents the emergency department complaining of progressive weakness over the last week with decreased energy and fatigue. She does describe some intermittent dysuria but denies increase in frequency. She denies any nausea vomiting, change in bowel habits, melena or hematochezia. According the patient's daughter, the patient has had progressive weakness over the last several days and was very confused this morning when she got up. Confusion is slowly improved through the day, but that is why she brought her in today. The history is provided by the patient and a relative. Fatigue  Severity:  Moderate  Onset quality:  Gradual  Duration:  1 week  Timing:  Constant  Progression:  Worsening  Chronicity:  New  Context: not alcohol use, not change in medication, not drug use and not urinary tract infection    Relieved by:  Nothing  Worsened by:  Standing  Ineffective treatments:  None tried  Associated symptoms: arthralgias, difficulty walking (Chronic and unchanged), dysuria and lethargy    Associated symptoms: no abdominal pain, no anorexia, no aphasia, no ataxia, no chest pain, no cough, no diarrhea, no dizziness, no numbness in extremities, no falls, no fever, no foul-smelling urine, no frequency, no headaches, no hematochezia, no loss of consciousness, no melena, no myalgias, no nausea, no near-syncope, no seizures, no sensory-motor deficit, no shortness of breath, no stroke symptoms, no syncope, no urgency, no vision change and no vomiting    Risk factors: congestive heart failure, coronary artery disease, diabetes and heart disease    Risk factors: no neurologic disease, no new medications and no recent stressors        Review of Systems   Constitutional: Positive for fatigue. Negative for fever. HENT: Negative for congestion. Respiratory: Negative for cough and shortness of breath. Cardiovascular: Negative for chest pain, syncope and near-syncope. Gastrointestinal: Negative for abdominal pain, anorexia, diarrhea, hematochezia, melena, nausea and vomiting. Genitourinary: Positive for dysuria. Negative for frequency and urgency. Musculoskeletal: Positive for arthralgias. Negative for falls and myalgias. Neurological: Negative for dizziness, seizures, loss of consciousness and headaches. Psychiatric/Behavioral: Positive for confusion (Earlier today). All other systems reviewed and are negative. Past Medical History:   Diagnosis Date    CAD (coronary artery disease)     Diabetes ([facility] [address]     Hypertension     Other ill-defined conditions(799.89)     choelsterol    Stroke [facility])         Past Surgical History:   Procedure Laterality Date    [facility] CARDIAC SURG PROCEDURE UNLIST      stented - doesn't know date        No family history on file. Social Connections:     Frequency of Communication with Friends and Family: Not on file    Frequency of Social Gatherings with Friends and Family: Not on file    Attends Denominational Services: Not on file    Active Member of Clubs or Organizations: Not on file    Attends Club or Organization Meetings: Not on file    Marital Status: Not on file        Allergies   Allergen Reactions    Codeine Nausea And Vomiting    Sulfa Antibiotics Nausea And Vomiting and Hives     Daughter reports but unsure of reaction          Vitals signs and nursing note reviewed. Patient Vitals for the past 4 hrs:   Temp Pulse Resp BP SpO2   06/18/22 1930 -- 67 -- (!) 101/54 95 %   06/18/22 1845 -- 67 16 105/60 96 %   06/18/22 1715 -- 67 18 (!) 104/55 95 %   06/18/22 1625 -- -- -- (!) 106/51 --   06/18/22 1615 -- -- -- (!) 71/43 --   06/18/22 1611 99.6 °F (37.6 °C) 99 16 (!) 67/45 100 %          Physical Exam  Vitals and nursing note reviewed. Constitutional:       General: She is not in acute distress. HENT:      Head: Normocephalic and atraumatic.       Nose: Nose normal.      Mouth/Throat:      Mouth: Mucous membranes are moist.   Eyes:      Extraocular Movements: Extraocular movements intact. Conjunctiva/sclera: Conjunctivae normal.      Pupils: Pupils are equal, round, and reactive to light. Cardiovascular:      Rate and Rhythm: Normal rate and regular rhythm. Heart sounds: No murmur heard. Pulmonary:      Effort: Pulmonary effort is normal.      Breath sounds: Normal breath sounds. Abdominal:      General: Abdomen is flat. Palpations: There is no mass. Tenderness: There is no abdominal tenderness. There is no right CVA tenderness or left CVA tenderness. Musculoskeletal:         General: Normal range of motion. Cervical back: Normal range of motion and neck supple. Right lower leg: No edema. Left lower leg: No edema. Skin:     General: Skin is warm and dry. Neurological:      General: No focal deficit present. Mental Status: She is alert and oriented to person, place, and time.    Psychiatric:         Mood and Affect: Mood and affect normal.         Speech: Speech normal.          Procedures    The patient was observed in the ED. due to the patient's generalized weakness, acute on chronic kidney injury, and episode of confusion this morning, case will be discussed with the hospitalist.  Results Reviewed:      Recent Results (from the past 24 hour(s))   CBC with Auto Differential    Collection Time: 06/18/22  4:39 PM   Result Value Ref Range    WBC 9.7 4.3 - 11.1 K/uL    RBC 3.41 (L) 4.05 - 5.2 M/uL    Hemoglobin 9.3 (L) 11.7 - 15.4 g/dL    Hematocrit 29.9 (L) 35.8 - 46.3 %    MCV 87.7 79.6 - 97.8 FL    MCH 27.3 26.1 - 32.9 PG    MCHC 31.1 (L) 31.4 - 35.0 g/dL    RDW 15.1 (H) 11.9 - 14.6 %    Platelets 057 002 - 522 K/uL    MPV 8.6 (L) 9.4 - 12.3 FL    nRBC 0.00 0.0 - 0.2 K/uL    Differential Type AUTOMATED      Seg Neutrophils 32 (L) 43 - 78 %    Lymphocytes 52 (H) 13 - 44 %    Monocytes 13 (H) 4.0 - 12.0 %    Eosinophils % 3 0.5 - 7.8 %    Basophils 1 0.0 - 2.0 %    Immature Granulocytes 0 0.0 - 5.0 %    Segs Absolute 3.1 1.7 - 8.2 K/UL    Absolute Lymph # 5.1 (H) 0.5 - 4.6 K/UL    Absolute Mono # 1.2 0.1 - 1.3 K/UL    Absolute Eos # 0.2 0.0 - 0.8 K/UL    Basophils Absolute 0.1 0.0 - 0.2 K/UL    Absolute Immature Granulocyte 0.0 0.0 - 0.5 K/UL   Comprehensive Metabolic Panel    Collection Time: 06/18/22  4:39 PM   Result Value Ref Range    Sodium 138 136 - 145 mmol/L    Potassium 4.6 3.5 - 5.1 mmol/L    Chloride 107 98 - 107 mmol/L    CO2 25 21 - 32 mmol/L    Anion Gap 6 (L) 7 - 16 mmol/L    Glucose 165 (H) 65 - 100 mg/dL    BUN 43 (H) 8 - 23 MG/DL    CREATININE 1.98 (H) 0.6 - 1.0 MG/DL    GFR  32 (L) >60 ml/min/1.73m2    GFR Non- 26 (L) >60 ml/min/1.73m2    Calcium 8.7 8.3 - 10.4 MG/DL    Total Bilirubin 0.6 0.2 - 1.1 MG/DL    ALT 54 12 - 65 U/L    AST 46 (H) 15 - 37 U/L    Alk Phosphatase 53 50 - 136 U/L    Total Protein 7.5 6.3 - 8.2 g/dL    Albumin 2.3 (L) 3.2 - 4.6 g/dL    Globulin 5.2 (H) 2.3 - 3.5 g/dL    Albumin/Globulin Ratio 0.4 (L) 1.2 - 3.5     EKG 12 Lead    Collection Time: 06/18/22  5:16 PM   Result Value Ref Range    Ventricular Rate 67 BPM    Atrial Rate 67 BPM    P-R Interval 174 ms    QRS Duration 92 ms    Q-T Interval 418 ms    QTc Calculation (Bazett) 441 ms    P Axis 46 degrees    R Axis -4 degrees    T Axis -3 degrees    Diagnosis       Sinus rhythm with sinus arrhythmia with occasional Premature ventricular   complexes     Lactic Acid    Collection Time: 06/18/22  6:02 PM   Result Value Ref Range    Lactic Acid, Plasma 1.0 0.4 - 2.0 MMOL/L   Urinalysis w rflx microscopic    Collection Time: 06/18/22  6:02 PM   Result Value Ref Range    Color, UA GREEN      Appearance TURBID      Specific Gravity, UA 1.011 1.001 - 1.023      pH, Urine 7.5 5.0 - 9.0      Protein,  (A) NEG mg/dL    Glucose, UA Negative mg/dL    Ketones, Urine Negative NEG mg/dL    Bilirubin Urine Negative NEG      Blood, Urine LARGE (A) NEG      Urobilinogen, Urine 0.2 0.2 - 1.0 EU/dL    Nitrite, Urine Negative NEG      Leukocyte Esterase, Urine LARGE (A) NEG      WBC, UA >100 0 /hpf    RBC, UA >100 0 /hpf    Epithelial Cells UA 5-10 0 /hpf    BACTERIA, URINE 4+ (H) 0 /hpf    OTHER OBSERVATIONS RESULTS VERIFIED MANUALLY         XR CHEST PORTABLE   Final Result   No consolidation. ED Course as of 06/18/22 2000   Sat Jun 18, 2022   4475 Patient had a catheterized urine, subsequent to which she developed a lot of discomfort in her lower abdomen. Was given morphine and Zofran for her discomfort. According the nurse, the urine looked very infected and after the catheter was pulled out did have some blood. [BB]      ED Course User Index  [BB] Lukas Overton MD        Voice dictation software was used during the making of this note. This software is not perfect and grammatical and other typographical errors may be present. This note has not been completely proofread for errors.      Lukas Overton MD  06/18/22 2009

## 2022-06-18 NOTE — ED TRIAGE NOTES
Patient lives with son who advises that patient has been having increased weakness x 1 week and daughter noticed mild confusion this morning by calling daughter wrong name. Patient has not been wanting to do self care and difficulty getting patient into wheelchair even. BP noted in 15U systolic and then repeat of 71/43. Patient complaining of pain to left foot and daughter advises knot present to heel and wound on great toe of left foot. Diabetic.

## 2022-06-19 ENCOUNTER — APPOINTMENT (OUTPATIENT)
Dept: CT IMAGING | Age: 77
DRG: 871 | End: 2022-06-19
Payer: MEDICARE

## 2022-06-19 PROBLEM — N17.0 ACUTE RENAL FAILURE WITH ACUTE TUBULAR NECROSIS SUPERIMPOSED ON CHRONIC KIDNEY DISEASE (HCC): Status: ACTIVE | Noted: 2022-06-18

## 2022-06-19 PROBLEM — A41.9 SEPSIS (HCC): Status: ACTIVE | Noted: 2018-01-16

## 2022-06-19 PROBLEM — I63.9 CEREBROVASCULAR ACCIDENT (HCC): Status: RESOLVED | Noted: 2019-04-10 | Resolved: 2022-06-19

## 2022-06-19 PROBLEM — U07.1 COVID-19 VIRUS INFECTION: Status: ACTIVE | Noted: 2020-06-29

## 2022-06-19 PROBLEM — I12.9 HYPERTENSIVE KIDNEY DISEASE WITH STAGE 3 CHRONIC KIDNEY DISEASE (HCC): Status: ACTIVE | Noted: 2020-06-29

## 2022-06-19 PROBLEM — E11.65 TYPE 2 DIABETES MELLITUS WITH HYPERGLYCEMIA, WITHOUT LONG-TERM CURRENT USE OF INSULIN (HCC): Status: ACTIVE | Noted: 2017-01-13

## 2022-06-19 PROBLEM — D72.820 LYMPHOCYTOSIS: Status: ACTIVE | Noted: 2022-06-19

## 2022-06-19 PROBLEM — E11.649 TYPE 2 DIABETES MELLITUS WITH HYPOGLYCEMIA, WITHOUT LONG-TERM CURRENT USE OF INSULIN (HCC): Status: ACTIVE | Noted: 2017-01-13

## 2022-06-19 PROBLEM — N28.9 RENAL INSUFFICIENCY SYNDROME: Status: ACTIVE | Noted: 2017-01-13

## 2022-06-19 PROBLEM — R91.1 PULMONARY NODULE: Status: ACTIVE | Noted: 2021-03-09

## 2022-06-19 PROBLEM — R74.8 ELEVATED LIPASE: Status: RESOLVED | Noted: 2021-03-09 | Resolved: 2022-06-19

## 2022-06-19 PROBLEM — A41.9 SEPSIS ASSOCIATED HYPOTENSION (HCC): Status: ACTIVE | Noted: 2022-06-19

## 2022-06-19 PROBLEM — N13.30 BILATERAL HYDRONEPHROSIS: Status: ACTIVE | Noted: 2022-06-19

## 2022-06-19 PROBLEM — R74.8 ELEVATED LIPASE: Status: ACTIVE | Noted: 2021-03-09

## 2022-06-19 PROBLEM — R32 INCONTINENCE: Status: ACTIVE | Noted: 2020-08-12

## 2022-06-19 PROBLEM — I63.9 CEREBROVASCULAR ACCIDENT (HCC): Status: ACTIVE | Noted: 2019-04-10

## 2022-06-19 PROBLEM — N30.00 ACUTE CYSTITIS WITHOUT HEMATURIA: Status: ACTIVE | Noted: 2017-01-13

## 2022-06-19 PROBLEM — I10 ESSENTIAL HYPERTENSION: Status: ACTIVE | Noted: 2017-01-13

## 2022-06-19 PROBLEM — A41.9 SEPSIS (HCC): Status: RESOLVED | Noted: 2018-01-16 | Resolved: 2022-06-19

## 2022-06-19 PROBLEM — N18.9 ACUTE RENAL FAILURE WITH ACUTE TUBULAR NECROSIS SUPERIMPOSED ON CHRONIC KIDNEY DISEASE (HCC): Status: ACTIVE | Noted: 2022-06-18

## 2022-06-19 LAB
ALBUMIN SERPL-MCNC: 2 G/DL (ref 3.2–4.6)
ALBUMIN/GLOB SERPL: 0.5 {RATIO} (ref 1.2–3.5)
ALP SERPL-CCNC: 45 U/L (ref 50–136)
ALT SERPL-CCNC: 44 U/L (ref 12–65)
ANION GAP SERPL CALC-SCNC: 4 MMOL/L (ref 7–16)
AST SERPL-CCNC: 34 U/L (ref 15–37)
BASOPHILS # BLD: 0.1 K/UL (ref 0–0.2)
BASOPHILS NFR BLD: 1 % (ref 0–2)
BILIRUB SERPL-MCNC: 0.4 MG/DL (ref 0.2–1.1)
BUN SERPL-MCNC: 40 MG/DL (ref 8–23)
CALCIUM SERPL-MCNC: 8 MG/DL (ref 8.3–10.4)
CHLORIDE SERPL-SCNC: 109 MMOL/L (ref 98–107)
CO2 SERPL-SCNC: 28 MMOL/L (ref 21–32)
CORTIS AM PEAK SERPL-MCNC: 12.2 UG/DL (ref 7–25)
CREAT SERPL-MCNC: 1.99 MG/DL (ref 0.6–1)
D DIMER PPP FEU-MCNC: 1.88 UG/ML(FEU)
DIFFERENTIAL METHOD BLD: ABNORMAL
EOSINOPHIL # BLD: 0.2 K/UL (ref 0–0.8)
EOSINOPHIL NFR BLD: 3 % (ref 0.5–7.8)
ERYTHROCYTE [DISTWIDTH] IN BLOOD BY AUTOMATED COUNT: 14.9 % (ref 11.9–14.6)
EST. AVERAGE GLUCOSE BLD GHB EST-MCNC: 137 MG/DL
FERRITIN SERPL-MCNC: 193 NG/ML (ref 8–388)
FOLATE SERPL-MCNC: 11.8 NG/ML (ref 3.1–17.5)
GLOBULIN SER CALC-MCNC: 4.3 G/DL (ref 2.3–3.5)
GLUCOSE BLD STRIP.AUTO-MCNC: 112 MG/DL (ref 65–100)
GLUCOSE BLD STRIP.AUTO-MCNC: 213 MG/DL (ref 65–100)
GLUCOSE BLD STRIP.AUTO-MCNC: 78 MG/DL (ref 65–100)
GLUCOSE SERPL-MCNC: 78 MG/DL (ref 65–100)
HAPTOGLOB SERPL-MCNC: 423 MG/DL (ref 30–200)
HBA1C MFR BLD: 6.4 % (ref 4.2–6.3)
HCT VFR BLD AUTO: 25.7 % (ref 35.8–46.3)
HGB BLD-MCNC: 7.9 G/DL (ref 11.7–15.4)
IMM GRANULOCYTES # BLD AUTO: 0 K/UL (ref 0–0.5)
IMM GRANULOCYTES NFR BLD AUTO: 0 % (ref 0–5)
IRON SATN MFR SERPL: 20 %
IRON SERPL-MCNC: 36 UG/DL (ref 35–150)
LACTATE SERPL-SCNC: 0.8 MMOL/L (ref 0.4–2)
LDH SERPL L TO P-CCNC: 191 U/L (ref 110–210)
LYMPHOCYTES # BLD: 5.4 K/UL (ref 0.5–4.6)
LYMPHOCYTES NFR BLD: 59 % (ref 13–44)
MCH RBC QN AUTO: 27.5 PG (ref 26.1–32.9)
MCHC RBC AUTO-ENTMCNC: 30.7 G/DL (ref 31.4–35)
MCV RBC AUTO: 89.5 FL (ref 79.6–97.8)
MONOCYTES # BLD: 1.1 K/UL (ref 0.1–1.3)
MONOCYTES NFR BLD: 12 % (ref 4–12)
NEUTS SEG # BLD: 2.3 K/UL (ref 1.7–8.2)
NEUTS SEG NFR BLD: 25 % (ref 43–78)
NRBC # BLD: 0 K/UL (ref 0–0.2)
PLATELET # BLD AUTO: 283 K/UL (ref 150–450)
PMV BLD AUTO: 8.6 FL (ref 9.4–12.3)
POTASSIUM SERPL-SCNC: 4.5 MMOL/L (ref 3.5–5.1)
PROCALCITONIN SERPL-MCNC: <0.05 NG/ML (ref 0–0.49)
PROT SERPL-MCNC: 6.3 G/DL (ref 6.3–8.2)
RBC # BLD AUTO: 2.87 M/UL (ref 4.05–5.2)
SERVICE CMNT-IMP: ABNORMAL
SERVICE CMNT-IMP: ABNORMAL
SERVICE CMNT-IMP: NORMAL
SODIUM SERPL-SCNC: 141 MMOL/L (ref 136–145)
TIBC SERPL-MCNC: 181 UG/DL (ref 250–450)
TSH W FREE THYROID IF ABNORMAL: 1.58 UIU/ML
VIT B12 SERPL-MCNC: 571 PG/ML (ref 193–986)
WBC # BLD AUTO: 9 K/UL (ref 4.3–11.1)

## 2022-06-19 PROCEDURE — 80053 COMPREHEN METABOLIC PANEL: CPT

## 2022-06-19 PROCEDURE — 82607 VITAMIN B-12: CPT

## 2022-06-19 PROCEDURE — 36415 COLL VENOUS BLD VENIPUNCTURE: CPT

## 2022-06-19 PROCEDURE — 99223 1ST HOSP IP/OBS HIGH 75: CPT | Performed by: UROLOGY

## 2022-06-19 PROCEDURE — 51702 INSERT TEMP BLADDER CATH: CPT

## 2022-06-19 PROCEDURE — 1100000000 HC RM PRIVATE

## 2022-06-19 PROCEDURE — 83540 ASSAY OF IRON: CPT

## 2022-06-19 PROCEDURE — P9047 ALBUMIN (HUMAN), 25%, 50ML: HCPCS | Performed by: FAMILY MEDICINE

## 2022-06-19 PROCEDURE — 6360000002 HC RX W HCPCS: Performed by: FAMILY MEDICINE

## 2022-06-19 PROCEDURE — 85025 COMPLETE CBC W/AUTO DIFF WBC: CPT

## 2022-06-19 PROCEDURE — 6370000000 HC RX 637 (ALT 250 FOR IP): Performed by: FAMILY MEDICINE

## 2022-06-19 PROCEDURE — 2580000003 HC RX 258: Performed by: FAMILY MEDICINE

## 2022-06-19 PROCEDURE — 83605 ASSAY OF LACTIC ACID: CPT

## 2022-06-19 PROCEDURE — 83615 LACTATE (LD) (LDH) ENZYME: CPT

## 2022-06-19 PROCEDURE — 84443 ASSAY THYROID STIM HORMONE: CPT

## 2022-06-19 PROCEDURE — 84145 PROCALCITONIN (PCT): CPT

## 2022-06-19 PROCEDURE — 83010 ASSAY OF HAPTOGLOBIN QUANT: CPT

## 2022-06-19 PROCEDURE — 82533 TOTAL CORTISOL: CPT

## 2022-06-19 PROCEDURE — 74176 CT ABD & PELVIS W/O CONTRAST: CPT

## 2022-06-19 PROCEDURE — 82746 ASSAY OF FOLIC ACID SERUM: CPT

## 2022-06-19 PROCEDURE — 85379 FIBRIN DEGRADATION QUANT: CPT

## 2022-06-19 PROCEDURE — 87040 BLOOD CULTURE FOR BACTERIA: CPT

## 2022-06-19 PROCEDURE — 82962 GLUCOSE BLOOD TEST: CPT

## 2022-06-19 PROCEDURE — 83036 HEMOGLOBIN GLYCOSYLATED A1C: CPT

## 2022-06-19 PROCEDURE — 82728 ASSAY OF FERRITIN: CPT

## 2022-06-19 RX ORDER — ALBUMIN (HUMAN) 12.5 G/50ML
25 SOLUTION INTRAVENOUS EVERY 6 HOURS
Status: COMPLETED | OUTPATIENT
Start: 2022-06-19 | End: 2022-06-20

## 2022-06-19 RX ORDER — INSULIN LISPRO 100 [IU]/ML
0-8 INJECTION, SOLUTION INTRAVENOUS; SUBCUTANEOUS
Status: DISCONTINUED | OUTPATIENT
Start: 2022-06-19 | End: 2022-06-22 | Stop reason: HOSPADM

## 2022-06-19 RX ORDER — INSULIN LISPRO 100 [IU]/ML
0-4 INJECTION, SOLUTION INTRAVENOUS; SUBCUTANEOUS NIGHTLY
Status: DISCONTINUED | OUTPATIENT
Start: 2022-06-19 | End: 2022-06-22 | Stop reason: HOSPADM

## 2022-06-19 RX ORDER — CARVEDILOL 3.12 MG/1
3.12 TABLET ORAL 2 TIMES DAILY WITH MEALS
Status: DISCONTINUED | OUTPATIENT
Start: 2022-06-19 | End: 2022-06-21

## 2022-06-19 RX ORDER — ALOGLIPTIN 6.25 MG/1
6.25 TABLET, FILM COATED ORAL DAILY
Status: DISCONTINUED | OUTPATIENT
Start: 2022-06-19 | End: 2022-06-22 | Stop reason: HOSPADM

## 2022-06-19 RX ADMIN — ALBUMIN (HUMAN) 25 G: 0.25 INJECTION, SOLUTION INTRAVENOUS at 09:02

## 2022-06-19 RX ADMIN — SODIUM CHLORIDE, PRESERVATIVE FREE 10 ML: 5 INJECTION INTRAVENOUS at 09:10

## 2022-06-19 RX ADMIN — ALBUMIN (HUMAN) 25 G: 0.25 INJECTION, SOLUTION INTRAVENOUS at 14:08

## 2022-06-19 RX ADMIN — HEPARIN SODIUM 5000 UNITS: 5000 INJECTION, SOLUTION INTRAVENOUS; SUBCUTANEOUS at 14:08

## 2022-06-19 RX ADMIN — SODIUM CHLORIDE: 9 INJECTION, SOLUTION INTRAVENOUS at 00:24

## 2022-06-19 RX ADMIN — CARVEDILOL 3.12 MG: 3.12 TABLET, FILM COATED ORAL at 16:39

## 2022-06-19 RX ADMIN — CLOPIDOGREL BISULFATE 75 MG: 75 TABLET ORAL at 09:02

## 2022-06-19 RX ADMIN — CEFTRIAXONE 1000 MG: 1 INJECTION, POWDER, FOR SOLUTION INTRAMUSCULAR; INTRAVENOUS at 21:04

## 2022-06-19 RX ADMIN — CETIRIZINE HYDROCHLORIDE 10 MG: 10 TABLET ORAL at 09:02

## 2022-06-19 RX ADMIN — SODIUM CHLORIDE: 9 INJECTION, SOLUTION INTRAVENOUS at 09:06

## 2022-06-19 RX ADMIN — ALBUMIN (HUMAN) 25 G: 0.25 INJECTION, SOLUTION INTRAVENOUS at 20:42

## 2022-06-19 RX ADMIN — ALOGLIPTIN 6.25 MG: 6.25 TABLET, FILM COATED ORAL at 09:10

## 2022-06-19 RX ADMIN — ATORVASTATIN CALCIUM 80 MG: 40 TABLET, FILM COATED ORAL at 21:04

## 2022-06-19 RX ADMIN — HEPARIN SODIUM 5000 UNITS: 5000 INJECTION, SOLUTION INTRAVENOUS; SUBCUTANEOUS at 21:33

## 2022-06-19 RX ADMIN — ACETAMINOPHEN 650 MG: 325 TABLET, FILM COATED ORAL at 14:08

## 2022-06-19 RX ADMIN — HEPARIN SODIUM 5000 UNITS: 5000 INJECTION, SOLUTION INTRAVENOUS; SUBCUTANEOUS at 06:19

## 2022-06-19 RX ADMIN — INSULIN LISPRO 2 UNITS: 100 INJECTION, SOLUTION INTRAVENOUS; SUBCUTANEOUS at 21:15

## 2022-06-19 RX ADMIN — SODIUM CHLORIDE, PRESERVATIVE FREE 10 ML: 5 INJECTION INTRAVENOUS at 21:20

## 2022-06-19 RX ADMIN — SODIUM CHLORIDE: 9 INJECTION, SOLUTION INTRAVENOUS at 19:44

## 2022-06-19 RX ADMIN — CARVEDILOL 3.12 MG: 3.12 TABLET, FILM COATED ORAL at 09:02

## 2022-06-19 ASSESSMENT — PAIN SCALES - GENERAL
PAINLEVEL_OUTOF10: 5
PAINLEVEL_OUTOF10: 0

## 2022-06-19 ASSESSMENT — PAIN DESCRIPTION - ORIENTATION: ORIENTATION: ANTERIOR

## 2022-06-19 ASSESSMENT — PAIN DESCRIPTION - LOCATION: LOCATION: ABDOMEN

## 2022-06-19 ASSESSMENT — PAIN DESCRIPTION - DESCRIPTORS: DESCRIPTORS: ACHING

## 2022-06-19 NOTE — H&P
[facility] Hospitalist Initial History and Physical Note    Patient: Mirella Acuña Date: 6/18/2022  female, 78 y.o. Admit Date: 6/18/2022  Attending: Miranda Heredia MD     ASSESSMENT AND PLAN:     Principal Problem:    ELFEGO (acute kidney injury) ([address]  Plan: IVF, follow BMP. Active Problems:    UTI (urinary tract infection)  Plan: IV Rocephin. IVF. Follow urine culture. Normocytic anemia  Plan: Follow CBC         DVT Prophylaxis: Lovenox      Code Status: FULL CODE      Disposition: Admit to med/surg for evaluation and treatment as per above. Anticipated discharge: 2-3 days     CHIEF COMPLAINT:  Weakness and fatigue    HISTORY OF PRESENT ILLNESS:      Patient Active Problem List   Diagnosis    ELFEGO (acute kidney injury) ([address]    UTI (urinary tract infection)    Normocytic anemia       Mirella Acuña is a 78 y.o. female, with a history of  has a past medical history of CAD (coronary artery disease), Diabetes ([address] Hypertension, Other ill-defined conditions(799.89), and Stroke ([address] ,  has a past surgical history that includes pr cardiac surg procedure unlist. who presents to the ER with report of progressively worsening weakness for the past few days, along with poor appetite and fatigue. Admits to dysuria, denies any fevers, chills, nausea, vomiting. Allergy  Allergies   Allergen Reactions    Codeine Nausea And Vomiting    Sulfa Antibiotics Nausea And Vomiting and Hives     Daughter reports but unsure of reaction         Medication list  Not in a hospital admission.      Past Medical History   Past Medical History:   Diagnosis Date    CAD (coronary artery disease)     Diabetes ([address]     Hypertension     Other ill-defined conditions(799.89)     choelsterol    Stroke [facility])        Past Surgical History:   Procedure Laterality Date    [facility] CARDIAC SURG PROCEDURE UNLIST      stented - doesn't know date       Social History   Social History     Socioeconomic History    Marital status: Legally  Value  Temperature Temp  Min: 99.6 °F (37.6 °C)  Max: 99.6 °F (37.6 °C) 99.6 °F (37.6 °C)    Pulse Pulse  Min: 66  Max: 99 66  Respiratory Resp  Min: 16  Max: 18 16  Blood Pressure BP  Min: 67/45  Max: 117/56 (!) 117/56  Pulse Oximetry SpO2  Min: 95 %  Max: 100 % 95 %  O2 No data recorded      Vital Most Recent Value First Value  Weight 190 lb (86.2 kg) Weight: 190 lb (86.2 kg)  Height 5' 2\" (157.5 cm) Height: 5' 2\" (157.5 cm)  BMI 34.8 N/A    Physical Exam:   General:     No acute distress, speaking in full sentences. Eyes:   No palpebral pallor or scleral icterus. ENT:   External auricular and nasal exam within normal limits. Cardiovascular: No cyanosis or edema of extremities. Respiratory:    No respiratory distress or accessory muscle use. Gastrointestinal:   Not actively vomiting, abdomen non-distended   Skin:      Normal color. No rashes, lesions, or jaundice. Neurologic:  CN II-XII grossly intact and symmetrical.      Moving all four extremities well with no focal deficits. Psychiatric:  Appropriate affect.  Alert       Intake/Output last 3 shifts:  No intake or output data in the 24 hours ending 06/18/22 2059     Labs:  Lab Results   Component Value Date     06/18/2022    K 4.6 06/18/2022     06/18/2022    CO2 25 06/18/2022    BUN 43 (H) 06/18/2022    CREATININE 1.98 (H) 06/18/2022    GLUCOSE 165 (H) 06/18/2022    CALCIUM 8.7 06/18/2022    PROT 7.5 06/18/2022    LABALBU 2.3 (L) 06/18/2022    BILITOT 0.6 06/18/2022    ALKPHOS 53 06/18/2022    AST 46 (H) 06/18/2022    ALT 54 06/18/2022    LABGLOM 26 (L) 06/18/2022    GFRAA 32 (L) 06/18/2022    GLOB 5.2 (H) 06/18/2022      No results found for: MG  Lab Results   Component Value Date    CALCIUM 8.7 06/18/2022        Lab Results   Component Value Date    WBC 9.7 06/18/2022    HGB 9.3 (L) 06/18/2022    HCT 29.9 (L) 06/18/2022    MCV 87.7 06/18/2022     06/18/2022        No results found for: INR, PROTIME     No results found for: CKTOTAL, CKMB, CKMBINDEX, TROPONINI     Imagining & Other Studies  XR CHEST PORTABLE  Narrative: AP PORTABLE CHEST X-RAY 6/18/2022 5:06 PM    HISTORY: Syncope  increasing weakness. COMPARISON: October 19, 2011    FINDINGS:  Lung volumes are diminished. There is no lobar consolidation, pleural effusions or pulmonary edema. Impression: No consolidation. No results found for this or any previous visit (from the past 4464 hour(s)).      Samantha Wills MD  6/18/2022 8:59 PM

## 2022-06-19 NOTE — CONSULTS
Consult Note            Date:6/19/2022        Patient Lizeth Palma     YOB: 1945     Age:76 y.o. Consults    History of Present Illness     Creatinine 1.99 June 2022  Creatinine 1.7 4 May 2022  PSA 0.9 7 March 2021    The patient is admitted with fatigue and found to have a UTI. During the course of her work-up a CT was obtained. This shows bilateral hydroureteronephrosis with a thick-walled bladder. The radiologist states that this could be due to ureteral obstruction. The patient is a poor historian however she does indicate that she has leakage of urine with stress maneuvers and urgency. She indicates that the stress component is worse. She is not wearing pads. Nocturia is 1-2. Her creatinine has gradually risen over the past 15 months. Past Medical History     Past Medical History:   Diagnosis Date    Acute cholecystitis 11/6/2018    Acute pyelonephritis 1/14/2017    CAD (coronary artery disease)     Cerebrovascular accident ([facility] [address] 4/10/2019    COVID-19 virus infection 6/29/2020    Diabetes ([facility] [address]     Hypertension     Other ill-defined conditions(799.89)     choelsterol    Stroke [facility])         Past Surgical History     Past Surgical History:   Procedure Laterality Date    ND CARDIAC SURG PROCEDURE UNLIST      stented - doesn't know date        Medications     Prior to Admission medications    Medication Sig Start Date End Date Taking? Authorizing Provider   acetaminophen (TYLENOL) 500 MG tablet Take 500 mg by mouth every 6 hours as needed 7/3/20   Historical Provider, MD   amLODIPine (NORVASC) 5 MG tablet Take 5 mg by mouth daily    Historical Provider, MD   aspirin 81 MG EC tablet Take 81 mg by mouth 2 times daily 5/18/21   Historical Provider, MD   atorvastatin (LIPITOR) 40 MG tablet atorvastatin 40 mg tablet   Take 1 tablet every day by oral route.     Historical Provider, MD   atorvastatin (LIPITOR) 80 MG tablet atorvastatin 80 mg tablet   Take 1 tablet every day by oral route for 90 days. Historical Provider, MD   carvedilol (COREG) 6.25 MG tablet carvedilol 6.25 mg tablet   Take 1 tablet twice a day by oral route for 90 days. 7/3/20   Historical Provider, MD   vitamin D (CHOLECALCIFEROL) 25 MCG (1000 UT) TABS tablet Take 1,000 Units by mouth daily    Historical Provider, MD   ciprofloxacin (CIPRO) 250 mg tablet ciprofloxacin 250 mg tablet    Historical Provider, MD   clopidogrel (PLAVIX) 75 MG tablet clopidogrel 75 mg tablet   TAKE 1 TABLET DAILY. Historical Provider, MD   fluconazole (DIFLUCAN) 150 MG tablet fluconazole 150 mg tablet    Historical Provider, MD   hydroCHLOROthiazide (HYDRODIURIL) 25 MG tablet hydrochlorothiazide 25 mg tablet   Take 1 tablet every day by oral route for 30 days. Historical Provider, MD   HYDROcodone homatropine (HYCODAN) 5-1.5 MG/5ML solution Take 5 mLs by mouth 4 times daily as needed. 10/19/11   Historical Provider, MD   insulin glargine (LANTUS) 100 UNIT/ML injection vial Inject 13 Units into the skin 7/3/20   Historical Provider, MD   lisinopril (PRINIVIL;ZESTRIL) 40 MG tablet Take 40 mg by mouth daily    Historical Provider, MD   loratadine (CLARITIN) 10 MG tablet loratadine 10 mg tablet   Take 1 tablet every day by oral route. Historical Provider, MD   losartan (COZAAR) 50 mg tablet losartan 50 mg tablet    Historical Provider, MD   lovastatin (MEVACOR) 40 MG tablet lovastatin 40 mg tablet   Take 1 tablet every day by oral route for 30 days.     Historical Provider, MD   metFORMIN (GLUCOPHAGE) 1000 MG tablet Take 1,000 mg by mouth 2 times daily (with meals)    Historical Provider, MD   methylPREDNISolone (MEDROL DOSEPACK) 4 MG tablet Take by mouth 10/19/11   Historical Provider, MD   metoprolol (LOPRESSOR) 100 MG tablet Take 100 mg by mouth 2 times daily    Historical Provider, MD   ondansetron (ZOFRAN-ODT) 4 MG disintegrating tablet Take 4 mg by mouth 3 times daily as needed 7/21/20   Historical Provider, MD   simvastatin (ZOCOR) 80 MG tablet Take 80 mg by mouth    Historical Provider, MD   SITagliptin (JANUVIA) 100 MG tablet Januvia 100 mg tablet   Take 1 tablet every day by oral route for 90 days.     Historical Provider, MD        alogliptin (NESINA) tablet 6.25 mg, Daily  insulin lispro (HUMALOG) injection vial 0-8 Units, TID WC  insulin lispro (HUMALOG) injection vial 0-4 Units, Nightly  carvedilol (COREG) tablet 3.125 mg, BID WC  albumin human 25 % IV solution 25 g, Q6H  [Held by provider] amLODIPine (NORVASC) tablet 5 mg, Daily  atorvastatin (LIPITOR) tablet 80 mg, Nightly  clopidogrel (PLAVIX) tablet 75 mg, Daily  HYDROcodone homatropine (HYCODAN) 5-1.5 MG/5ML solution 5 mL, Q4H PRN  [Held by provider] insulin glargine (LANTUS) injection vial 13 Units, Nightly  cetirizine (ZYRTEC) tablet 10 mg, Daily  [Held by provider] losartan (COZAAR) tablet 50 mg, Daily  sodium chloride flush 0.9 % injection 5-40 mL, 2 times per day  sodium chloride flush 0.9 % injection 5-40 mL, PRN  0.9 % sodium chloride infusion, Continuous  ondansetron (ZOFRAN-ODT) disintegrating tablet 4 mg, Q8H PRN   Or  ondansetron (ZOFRAN) injection 4 mg, Q6H PRN  polyethylene glycol (GLYCOLAX) packet 17 g, Daily PRN  acetaminophen (TYLENOL) tablet 650 mg, Q6H PRN   Or  acetaminophen (TYLENOL) suppository 650 mg, Q6H PRN  cefTRIAXone (ROCEPHIN) 1000 mg IVPB in NS 50ml minibag, Q24H  glucose chewable tablet 16 g, PRN  dextrose bolus 10% 125 mL, PRN   Or  dextrose bolus 10% 250 mL, PRN  glucagon (rDNA) injection 1 mg, PRN  dextrose 5 % solution, PRN  heparin (porcine) injection 5,000 Units, 3 times per day          Allergies   Codeine and Sulfa antibiotics    Social History     Social History     Socioeconomic History    Marital status: Legally      Spouse name: None    Number of children: None    Years of education: None    Highest education level: None   Occupational History    None   Tobacco Use    Smoking status: Never Smoker    Smokeless tobacco: Never Used   Substance and Sexual Activity    Alcohol use: Never    Drug use: Never    Sexual activity: None   Other Topics Concern    None   Social History Narrative    None     Social Determinants of Health     Financial Resource Strain:     Difficulty of Paying Living Expenses: Not on file   Food Insecurity:     Worried About Running Out of Food in the Last Year: Not on file    Milly of Food in the Last Year: Not on file   Transportation Needs:     Lack of Transportation (Medical): Not on file    Lack of Transportation (Non-Medical): Not on file   Physical Activity:     Days of Exercise per Week: Not on file    Minutes of Exercise per Session: Not on file   Stress:     Feeling of Stress : Not on file   Social Connections:     Frequency of Communication with Friends and Family: Not on file    Frequency of Social Gatherings with Friends and Family: Not on file    Attends [employer] Services: Not on file    Active Member of [address] [place] [address] [employer] or Organizations: Not on file    Attends Club or Organization Meetings: Not on file    Marital Status: Not on file   Intimate Partner Violence:     Fear of Current or Ex-Partner: Not on file    Emotionally Abused: Not on file    Physically Abused: Not on file    Sexually Abused: Not on file   Housing Stability:     Unable to Pay for Housing in the Last Year: Not on file    Number of [place] in the Last Year: Not on file    Unstable Housing in the Last Year: Not on file        Family History   No family history on file. Review of Systems   Review of Systems     Physical Exam   BP (!) 119/50   Pulse 71   Temp 98.1 °F (36.7 °C)   Resp 16   Ht 5' 2\" (1.575 m)   Wt 182 lb 1.6 oz (82.6 kg)   SpO2 97%   BMI 33.31 kg/m²      Physical Exam     The patient was poorly compliant with exam.  The abdomen is soft and nontender. Vaginal exam was very limited but I do not think she has a significant cystocele.     Labs    CBC:  Recent Labs     06/18/22  1639 06/19/22  0541   WBC 9.7 9.0   RBC 3.41* 2.87*   HGB 9.3* 7.9*   HCT 29.9* 25.7*   MCV 87.7 89.5   RDW 15.1* 14.9*    283     CHEMISTRIES:  Recent Labs     06/18/22  1639 06/19/22  0541    141   K 4.6 4.5    109*   CO2 25 28   BUN 43* 40*   CREATININE 1.98* 1.99*   GLUCOSE 165* 78     PT/INR:No results for input(s): PROTIME, INR in the last 72 hours. APTT:No results for input(s): APTT in the last 72 hours. LIVER PROFILE:  Recent Labs     06/18/22 1639 06/19/22  0541   AST 46* 34   ALT 54 44   BILITOT 0.6 0.4   ALKPHOS 53 45*       Imaging/Diagnostics   No results found. Assessment      Hospital Problems           Last Modified POA    * (Principal) Acute renal failure with acute tubular necrosis superimposed on chronic kidney disease ([address] 7/57/5960 Yes    Complicated UTI (urinary tract infection) 6/19/2022 Yes    Normocytic anemia 6/18/2022 Yes    Sepsis associated hypotension ([address] 6/19/2022 Yes    Coronary artery disease involving native coronary artery of native heart 6/19/2022 Yes    Essential hypertension 6/19/2022 Yes    Hyperlipidemia 6/19/2022 Yes    Obesity with body mass index 30 or greater 6/19/2022 Yes    Hypertensive kidney disease with stage 3 chronic kidney disease ([address] 6/19/2022 Yes    Type 2 diabetes mellitus with hypoglycemia, without long-term current use of insulin ([address] 6/19/2022 Yes    Lymphocytosis 6/19/2022 Yes    Bilateral hydronephrosis 6/19/2022 Yes    Obstructive nephropathy 6/19/2022 Yes          Plan   I doubt the patient has bilateral ureteral obstruction. Its much more likely that the hydronephrosis is due to persistent elevated intravesical pressure secondary to OAB. Solano catheter was placed by the nurses while I was examining the patient. She may benefit from medications but I would be hesitant to start her on an anticholinergic.   Leave Solano to gravity and when she sees us in the office we could consider either Myrbetriq or Gemtesa for OAB.  Electronically signed by Rob Brito MD on 5/24/22 at 8:20 AM EDT

## 2022-06-19 NOTE — CARE COORDINATION
06/19/22 1059   Service Assessment   Patient Orientation Alert and [address]   PCP Verified by CM Yes   Social/Functional History   Receives Help From Family   78 yr-old F with ELFEGO & UTI. Will have help from her daugther at discharge. Chart screened by  for discharge planning. No needs identified at this time. Please consult  if any new issues arise.

## 2022-06-19 NOTE — PLAN OF CARE
Problem: Discharge Planning  Goal: Discharge to home or other facility with appropriate resources  6/19/2022 0748 by Tess Smith RN  Outcome: Progressing  6/19/2022 0017 by Saul Isaac RN  Outcome: Progressing     Problem: Safety - Adult  Goal: Free from fall injury  6/19/2022 0748 by Tess Smith RN  Outcome: Progressing  6/19/2022 0017 by Saul Isaac RN  Outcome: Progressing     Problem: ABCDS Injury Assessment  Goal: Absence of physical injury  6/19/2022 0748 by Tess Smith RN  Outcome: Progressing  6/19/2022 0017 by Saul Isaac RN  Outcome: Progressing

## 2022-06-19 NOTE — PROGRESS NOTES
TRANSFER - IN REPORT:    Verbal report received from Caro KENNEDY on [facility]  being received from ED for routine progression of patient care      Report consisted of patient's Situation, Background, Assessment and   Recommendations(SBAR). Information from the following report(s) Index, Adult Overview and MAR was reviewed with the receiving nurse. Opportunity for questions and clarification was provided. Assessment completed upon patient's arrival to unit and care assumed.

## 2022-06-19 NOTE — PROGRESS NOTES
Hospitalist Progress Note   Admit Date:  2022  4:14 PM   Name:  Jose Roberto Owusu   Age:  78 y.o. Sex:  female  :  1945   MRN:  [id number]   Room:  [facility]/    Presenting Complaint: Fatigue     Reason(s) for Admission: Transient alteration of awareness [R40.4]  Dehydration [E86.0]  Generalized weakness [R53.1]  ELFEGO (acute kidney injury) ([facility] [address] [N17.9]  Urinary tract infection with hematuria, site unspecified [N39.0, R31.9]  Acute kidney injury superimposed on chronic kidney disease ([facility] [address] [N17.9, N18.9]     Hospital Course & Interval History:   77 y/o F PMHx obesity, CAD s/p MICKI to RCA , T2DM, HTN, HLD, anemia, L pontine CVA in  with R sided weakness, pulmonary nodule who presented to ED on  with son and daughter who noted increasing weakness x 1 week and new confusion today, calling her daughter by the wrong name. She has been unable to perform self-care. In ED, SBP 60s initially, repeat BP 71/43. She rec'd IVF bolus. She required cath for urine collection which caused a lot of abdominal discomfort. Per nurse, urine loved very infected. Labs significant for Scr 1.98 BUN 43, hgb 9.3. LA 1.0 however collected 3 hours after initial labs and treatment. UA  Large blood, large LE. She was admitted for UTI and ELFEGO on IVF. Per chart review, suffered UTI in April with MDR E coli. Subjective/24hr Events (22):   Glucose 70s this AM after rec'ing home glargine regimen. Lisinopril, losartan hctz, metformin, amlodipine, coreg 6.25,  Metoprolol 100 mg ordered on admission discontined due to contraindications. She was hypotensive this AM. She was started on albumin q6h and BP meds discontinued. Coreg reduced to 3.125 to prevent rebound tachycardia. Ordered CT a/p renal stone which showed bilateral hydronephrosis, hydroureter w/ no renal stone or mass found, concerning for bladder obstruction. I consulted urology and ordered santos insertion. Discussed plan with patient at bedside.  She c/o lethargy, nausea, lower abd pain and back pain. Assessment & Plan:     Principal Problem:  ELFEGO on CKD3a 2/2 ATN and Post obstructive nephropathy   >> ELFEGO appears subacute with prior baseline <1, 5/28/22 Scr 1.74  Scr 1.9 (Bcr ~1)  BL hydronephrosis, hydroureter, no stone or mass suspected bladder outlet. Insert santos. Consult urology  Maintain MAP >65 mm hg   Avoid anti-RAAS agents, nephrotoxic agents, and phosphate enemas   Renal Dosing   Strict I&O, Daily Weights, Daily BMP/CMP     Complicated UTI - 2/2 urinary retention. Rocephin IV. Bilateral hydronephrosis - insert santos. Consult urology. Normocytic anemia  Plan: check anemia labs and rule out hemolytic anemia     Sepsis associated hypotension (HCC)  Plan: IVF, albumin, hold home anti hypertensives     Coronary artery disease involving native coronary artery of native heart  Cont. Plavix, statin. Reduce coreg 3.125 to reduce rebound tachycardia from abrupt discontinuation. Hold ACEI/ARB    Essential hypertension  Plan: hold norvasc, arb/acei, reduce coreg, hold hctz     Hyperlipidemia  Plan: statin    Obesity with body mass index 30 or greater  Plan: adds to complexity       Type 2 diabetes mellitus with hypoglycemia, without long-term current use of insulin (HCC)  Plan: hold glargine. DC metformin. DPP4i likey okay but had pancreatitis in the past      Lymphocytosis  Plan: check peripheral smear, anema labs, rule out hemolysis         Discharge Planning:      Not stable     Diet:  ADULT DIET;  Regular  DVT PPx: heparin   Code status: Full Code    Hospital Problems:  Principal Problem:    Acute renal failure with acute tubular necrosis superimposed on chronic kidney disease ([facility] [address]  Active Problems:    Complicated UTI (urinary tract infection)    Normocytic anemia    Sepsis associated hypotension (HCC)    Coronary artery disease involving native coronary artery of native heart    Essential hypertension    Hyperlipidemia    Obesity with body is flat. Bowel sounds are normal. There is no distension. Palpations: Abdomen is soft. Tenderness: There is abdominal tenderness. Musculoskeletal:      Cervical back: Neck supple. Skin:     General: Skin is dry. Neurological:      Mental Status: She is oriented to person, place, and time.    Psychiatric:         Mood and Affect: Mood normal.         Behavior: Behavior normal.         I have reviewed ordered lab tests and independently visualized imaging below:    Recent Labs:  Recent Results (from the past 48 hour(s))   CBC with Auto Differential    Collection Time: 06/18/22  4:39 PM   Result Value Ref Range    WBC 9.7 4.3 - 11.1 K/uL    RBC 3.41 (L) 4.05 - 5.2 M/uL    Hemoglobin 9.3 (L) 11.7 - 15.4 g/dL    Hematocrit 29.9 (L) 35.8 - 46.3 %    MCV 87.7 79.6 - 97.8 FL    MCH 27.3 26.1 - 32.9 PG    MCHC 31.1 (L) 31.4 - 35.0 g/dL    RDW 15.1 (H) 11.9 - 14.6 %    Platelets 958 370 - 371 K/uL    MPV 8.6 (L) 9.4 - 12.3 FL    nRBC 0.00 0.0 - 0.2 K/uL    Differential Type AUTOMATED      Seg Neutrophils 32 (L) 43 - 78 %    Lymphocytes 52 (H) 13 - 44 %    Monocytes 13 (H) 4.0 - 12.0 %    Eosinophils % 3 0.5 - 7.8 %    Basophils 1 0.0 - 2.0 %    Immature Granulocytes 0 0.0 - 5.0 %    Segs Absolute 3.1 1.7 - 8.2 K/UL    Absolute Lymph # 5.1 (H) 0.5 - 4.6 K/UL    Absolute Mono # 1.2 0.1 - 1.3 K/UL    Absolute Eos # 0.2 0.0 - 0.8 K/UL    Basophils Absolute 0.1 0.0 - 0.2 K/UL    Absolute Immature Granulocyte 0.0 0.0 - 0.5 K/UL   Comprehensive Metabolic Panel    Collection Time: 06/18/22  4:39 PM   Result Value Ref Range    Sodium 138 136 - 145 mmol/L    Potassium 4.6 3.5 - 5.1 mmol/L    Chloride 107 98 - 107 mmol/L    CO2 25 21 - 32 mmol/L    Anion Gap 6 (L) 7 - 16 mmol/L    Glucose 165 (H) 65 - 100 mg/dL    BUN 43 (H) 8 - 23 MG/DL    CREATININE 1.98 (H) 0.6 - 1.0 MG/DL    GFR  32 (L) >60 ml/min/1.73m2    GFR Non- 26 (L) >60 ml/min/1.73m2    Calcium 8.7 8.3 - 10.4 MG/DL    Total Bilirubin 0.6 0.2 - 1.1 MG/DL    ALT 54 12 - 65 U/L    AST 46 (H) 15 - 37 U/L    Alk Phosphatase 53 50 - 136 U/L    Total Protein 7.5 6.3 - 8.2 g/dL    Albumin 2.3 (L) 3.2 - 4.6 g/dL    Globulin 5.2 (H) 2.3 - 3.5 g/dL    Albumin/Globulin Ratio 0.4 (L) 1.2 - 3.5     EKG 12 Lead    Collection Time: 06/18/22  5:16 PM   Result Value Ref Range    Ventricular Rate 67 BPM    Atrial Rate 67 BPM    P-R Interval 174 ms    QRS Duration 92 ms    Q-T Interval 418 ms    QTc Calculation (Bazett) 441 ms    P Axis 46 degrees    R Axis -4 degrees    T Axis -3 degrees    Diagnosis       Sinus rhythm with sinus arrhythmia with occasional Premature ventricular   complexes     Lactic Acid    Collection Time: 06/18/22  6:02 PM   Result Value Ref Range    Lactic Acid, Plasma 1.0 0.4 - 2.0 MMOL/L   Urinalysis w rflx microscopic    Collection Time: 06/18/22  6:02 PM   Result Value Ref Range    Color, UA GREEN      Appearance TURBID      Specific Gravity, UA 1.011 1.001 - 1.023      pH, Urine 7.5 5.0 - 9.0      Protein,  (A) NEG mg/dL    Glucose, UA Negative mg/dL    Ketones, Urine Negative NEG mg/dL    Bilirubin Urine Negative NEG      Blood, Urine LARGE (A) NEG      Urobilinogen, Urine 0.2 0.2 - 1.0 EU/dL    Nitrite, Urine Negative NEG      Leukocyte Esterase, Urine LARGE (A) NEG      WBC, UA >100 0 /hpf    RBC, UA >100 0 /hpf    Epithelial Cells UA 5-10 0 /hpf    BACTERIA, URINE 4+ (H) 0 /hpf    OTHER OBSERVATIONS RESULTS VERIFIED MANUALLY     Culture, Blood 1    Collection Time: 06/18/22  8:36 PM    Specimen: Blood   Result Value Ref Range    Special Requests LEFT  HAND        Culture NO GROWTH AFTER 8 HOURS     Culture, Blood 1    Collection Time: 06/18/22  8:39 PM    Specimen: Blood   Result Value Ref Range    Special Requests RIGHT  HAND        Culture NO GROWTH AFTER 8 HOURS     Comprehensive Metabolic Panel w/ Reflex to MG    Collection Time: 06/19/22  5:41 AM   Result Value Ref Range    Sodium 141 136 - 145 mmol/L    Potassium 4.5 3.5 - 5.1 mmol/L    Chloride 109 (H) 98 - 107 mmol/L    CO2 28 21 - 32 mmol/L    Anion Gap 4 (L) 7 - 16 mmol/L    Glucose 78 65 - 100 mg/dL    BUN 40 (H) 8 - 23 MG/DL    CREATININE 1.99 (H) 0.6 - 1.0 MG/DL    GFR  32 (L) >60 ml/min/1.73m2    GFR Non- 26 (L) >60 ml/min/1.73m2    Calcium 8.0 (L) 8.3 - 10.4 MG/DL    Total Bilirubin 0.4 0.2 - 1.1 MG/DL    ALT 44 12 - 65 U/L    AST 34 15 - 37 U/L    Alk Phosphatase 45 (L) 50 - 136 U/L    Total Protein 6.3 6.3 - 8.2 g/dL    Albumin 2.0 (L) 3.2 - 4.6 g/dL    Globulin 4.3 (H) 2.3 - 3.5 g/dL    Albumin/Globulin Ratio 0.5 (L) 1.2 - 3.5     CBC with Auto Differential    Collection Time: 06/19/22  5:41 AM   Result Value Ref Range    WBC 9.0 4.3 - 11.1 K/uL    RBC 2.87 (L) 4.05 - 5.2 M/uL    Hemoglobin 7.9 (L) 11.7 - 15.4 g/dL    Hematocrit 25.7 (L) 35.8 - 46.3 %    MCV 89.5 79.6 - 97.8 FL    MCH 27.5 26.1 - 32.9 PG    MCHC 30.7 (L) 31.4 - 35.0 g/dL    RDW 14.9 (H) 11.9 - 14.6 %    Platelets 861 398 - 862 K/uL    MPV 8.6 (L) 9.4 - 12.3 FL    nRBC 0.00 0.0 - 0.2 K/uL    Differential Type AUTOMATED      Seg Neutrophils 25 (L) 43 - 78 %    Lymphocytes 59 (H) 13 - 44 %    Monocytes 12 4.0 - 12.0 %    Eosinophils % 3 0.5 - 7.8 %    Basophils 1 0.0 - 2.0 %    Immature Granulocytes 0 0.0 - 5.0 %    Segs Absolute 2.3 1.7 - 8.2 K/UL    Absolute Lymph # 5.4 (H) 0.5 - 4.6 K/UL    Absolute Mono # 1.1 0.1 - 1.3 K/UL    Absolute Eos # 0.2 0.0 - 0.8 K/UL    Basophils Absolute 0.1 0.0 - 0.2 K/UL    Absolute Immature Granulocyte 0.0 0.0 - 0.5 K/UL   POCT Glucose    Collection Time: 06/19/22  6:24 AM   Result Value Ref Range    POC Glucose 78 65 - 100 mg/dL    Performed by: Ruth    Lactate Dehydrogenase    Collection Time: 06/19/22  8:17 AM   Result Value Ref Range     110 - 210 U/L   Haptoglobin    Collection Time: 06/19/22  8:17 AM   Result Value Ref Range    Haptoglobin 423 (H) 30 - 200 mg/dL   D-Dimer, Quantitative    Collection Time: 06/19/22  8:17 AM   Result Value Ref Range    D-Dimer, Quant 1.88 (H) <0.56 ug/ml(FEU)   Ferritin    Collection Time: 06/19/22  8:17 AM   Result Value Ref Range    Ferritin 193 8 - 388 NG/ML   Transferrin Saturation    Collection Time: 06/19/22  8:17 AM   Result Value Ref Range    Iron 36 35 - 150 ug/dL    TIBC 181 (L) 250 - 450 ug/dL    TRANSFERRIN SATURATION 20 %   Lactic Acid    Collection Time: 06/19/22  8:17 AM   Result Value Ref Range    Lactic Acid, Plasma 0.8 0.4 - 2.0 MMOL/L   Hemoglobin A1C    Collection Time: 06/19/22  8:17 AM   Result Value Ref Range    Hemoglobin A1C 6.4 (H) 4.20 - 6.30 %    eAG 137 mg/dL   TSH with Reflex    Collection Time: 06/19/22  8:17 AM   Result Value Ref Range    TSH w Free Thyroid if Abnormal 1.58 UIU/ML   Procalcitonin    Collection Time: 06/19/22  8:17 AM   Result Value Ref Range    Procalcitonin <0.05 0.00 - 0.49 ng/mL       Other Studies:  CT ABDOMEN PELVIS RENAL STONE Additional Contrast? Radiologist Recommendation   Final Result   Marked circumferential wall thickening with pericystic inflammation indicative   of an acute urinary tract infection. There is obstruction with moderate   bilateral hydronephrosis and hydroureter. No obstructing stone or extrinsic mass   noted. Bladder outlet obstruction should be considered. XR CHEST PORTABLE   Final Result   No consolidation.           Current Meds:  Current Facility-Administered Medications   Medication Dose Route Frequency    alogliptin (NESINA) tablet 6.25 mg  6.25 mg Oral Daily    insulin lispro (HUMALOG) injection vial 0-8 Units  0-8 Units SubCUTAneous TID     insulin lispro (HUMALOG) injection vial 0-4 Units  0-4 Units SubCUTAneous Nightly    carvedilol (COREG) tablet 3.125 mg  3.125 mg Oral BID     albumin human 25 % IV solution 25 g  25 g IntraVENous Q6H    [Held by provider] amLODIPine (NORVASC) tablet 5 mg  5 mg Oral Daily    atorvastatin (LIPITOR) tablet 80 mg  80 mg Oral Nightly    clopidogrel (PLAVIX) tablet 75 mg  75 mg Oral Daily    HYDROcodone homatropine (HYCODAN) 5-1.5 MG/5ML solution 5 mL  5 mL Oral Q4H PRN    [Held by provider] insulin glargine (LANTUS) injection vial 13 Units  13 Units SubCUTAneous Nightly    cetirizine (ZYRTEC) tablet 10 mg  10 mg Oral Daily    [Held by provider] losartan (COZAAR) tablet 50 mg  50 mg Oral Daily    sodium chloride flush 0.9 % injection 5-40 mL  5-40 mL IntraVENous 2 times per day    sodium chloride flush 0.9 % injection 5-40 mL  5-40 mL IntraVENous PRN    0.9 % sodium chloride infusion   IntraVENous Continuous    ondansetron (ZOFRAN-ODT) disintegrating tablet 4 mg  4 mg Oral Q8H PRN    Or    ondansetron (ZOFRAN) injection 4 mg  4 mg IntraVENous Q6H PRN    polyethylene glycol (GLYCOLAX) packet 17 g  17 g Oral Daily PRN    acetaminophen (TYLENOL) tablet 650 mg  650 mg Oral Q6H PRN    Or    acetaminophen (TYLENOL) suppository 650 mg  650 mg Rectal Q6H PRN    cefTRIAXone (ROCEPHIN) 1000 mg IVPB in NS 50ml minibag  1,000 mg IntraVENous Q24H    glucose chewable tablet 16 g  4 tablet Oral PRN    dextrose bolus 10% 125 mL  125 mL IntraVENous PRN    Or    dextrose bolus 10% 250 mL  250 mL IntraVENous PRN    glucagon (rDNA) injection 1 mg  1 mg IntraMUSCular PRN    dextrose 5 % solution  100 mL/hr IntraVENous PRN    heparin (porcine) injection 5,000 Units  5,000 Units SubCUTAneous 3 times per day       Signed:  Lisa Birminghams, DO, DO    Part of this note may have been written by using a voice dictation software. The note has been proof read but may still contain some grammatical/other typographical errors.

## 2022-06-19 NOTE — ED NOTES
TRANSFER - OUT REPORT:    Verbal report given to RN on Daylin Brown  being transferred to [facility] for routine progression of patient care       Report consisted of patient's Situation, Background, Assessment and   Recommendations(SBAR). Information from the following report(s) ED SBAR was reviewed with the receiving nurse. Lines:   Peripheral IV 06/18/22 Right;Posterior Hand (Active)        Opportunity for questions and clarification was provided.       Patient transported with:  Registered Nurse       Irma Guy RN  06/18/22 [id number]

## 2022-06-20 PROBLEM — Z79.4 TYPE 2 DIABETES MELLITUS WITH HYPOGLYCEMIA WITHOUT COMA, WITH LONG-TERM CURRENT USE OF INSULIN (HCC): Status: ACTIVE | Noted: 2017-01-13

## 2022-06-20 LAB
ACCESSION NUMBER, LLC1M: ABNORMAL
ACINETOBACTER CALCOAC BAUMANNII COMPLEX BY PCR: NOT DETECTED
ALBUMIN SERPL-MCNC: 3.2 G/DL (ref 3.2–4.6)
ALBUMIN/GLOB SERPL: 0.9 {RATIO} (ref 1.2–3.5)
ALP SERPL-CCNC: 46 U/L (ref 50–136)
ALT SERPL-CCNC: 55 U/L (ref 12–65)
ANION GAP SERPL CALC-SCNC: 5 MMOL/L (ref 7–16)
AST SERPL-CCNC: 50 U/L (ref 15–37)
BACTEROIDES FRAGILIS BY PCR: NOT DETECTED
BASOPHILS # BLD: 0 K/UL (ref 0–0.2)
BASOPHILS NFR BLD: 0 % (ref 0–2)
BILIRUB SERPL-MCNC: 0.6 MG/DL (ref 0.2–1.1)
BIOFIRE TEST COMMENT: ABNORMAL
BUN SERPL-MCNC: 23 MG/DL (ref 8–23)
C ALBICANS DNA BLD POS QL NAA+NON-PROBE: NOT DETECTED
C GLABRATA DNA BLD POS QL NAA+NON-PROBE: NOT DETECTED
C KRUSEI DNA BLD POS QL NAA+NON-PROBE: NOT DETECTED
C PARAP DNA BLD POS QL NAA+NON-PROBE: NOT DETECTED
C TROPICLS DNA BLD POS QL NAA+NON-PROBE: NOT DETECTED
CALCIUM SERPL-MCNC: 8.3 MG/DL (ref 8.3–10.4)
CANDIDA AURIS BY PCR: NOT DETECTED
CHLORIDE SERPL-SCNC: 116 MMOL/L (ref 98–107)
CO2 SERPL-SCNC: 25 MMOL/L (ref 21–32)
CREAT SERPL-MCNC: 1.16 MG/DL (ref 0.6–1)
CRYPTOCOCCUS NEOFORMANS/GATTII BY PCR: NOT DETECTED
DIFFERENTIAL METHOD BLD: ABNORMAL
E CLOAC COMP DNA BLD POS NAA+NON-PROBE: NOT DETECTED
E COLI DNA BLD POS QL NAA+NON-PROBE: NOT DETECTED
ENTEROBACTERALES BY PCR: NOT DETECTED
ENTEROCOCCUS FAECALIS BY PCR: NOT DETECTED
ENTEROCOCCUS FAECIUM BY PCR: NOT DETECTED
EOSINOPHIL # BLD: 0.2 K/UL (ref 0–0.8)
EOSINOPHIL NFR BLD: 3 % (ref 0.5–7.8)
ERYTHROCYTE [DISTWIDTH] IN BLOOD BY AUTOMATED COUNT: 14.9 % (ref 11.9–14.6)
GLOBULIN SER CALC-MCNC: 3.4 G/DL (ref 2.3–3.5)
GLUCOSE BLD STRIP.AUTO-MCNC: 123 MG/DL (ref 65–100)
GLUCOSE BLD STRIP.AUTO-MCNC: 141 MG/DL (ref 65–100)
GLUCOSE BLD STRIP.AUTO-MCNC: 171 MG/DL (ref 65–100)
GLUCOSE BLD STRIP.AUTO-MCNC: 266 MG/DL (ref 65–100)
GLUCOSE SERPL-MCNC: 145 MG/DL (ref 65–100)
GP B STREP DNA BLD POS QL NAA+NON-PROBE: NOT DETECTED
HAEM INFLU DNA BLD POS QL NAA+NON-PROBE: NOT DETECTED
HCT VFR BLD AUTO: 22.3 % (ref 35.8–46.3)
HCT VFR BLD AUTO: 23.3 % (ref 35.8–46.3)
HGB BLD-MCNC: 7 G/DL (ref 11.7–15.4)
HGB BLD-MCNC: 7.3 G/DL (ref 11.7–15.4)
IMM GRANULOCYTES # BLD AUTO: 0 K/UL (ref 0–0.5)
IMM GRANULOCYTES NFR BLD AUTO: 0 % (ref 0–5)
K OXYTOCA DNA BLD POS QL NAA+NON-PROBE: NOT DETECTED
KLEBSIELLA AEROGENES BY PCR: NOT DETECTED
KLEBSIELLA PNEUMONIAE GROUP BY PCR: NOT DETECTED
L MONOCYTOG DNA BLD POS QL NAA+NON-PROBE: NOT DETECTED
LYMPHOCYTES # BLD: 3.4 K/UL (ref 0.5–4.6)
LYMPHOCYTES NFR BLD: 39 % (ref 13–44)
MAGNESIUM SERPL-MCNC: 1.9 MG/DL (ref 1.8–2.4)
MCH RBC QN AUTO: 27.6 PG (ref 26.1–32.9)
MCHC RBC AUTO-ENTMCNC: 31.4 G/DL (ref 31.4–35)
MCV RBC AUTO: 87.8 FL (ref 79.6–97.8)
MONOCYTES # BLD: 0.9 K/UL (ref 0.1–1.3)
MONOCYTES NFR BLD: 10 % (ref 4–12)
N MEN DNA BLD POS QL NAA+NON-PROBE: NOT DETECTED
NEUTS SEG # BLD: 4 K/UL (ref 1.7–8.2)
NEUTS SEG NFR BLD: 47 % (ref 43–78)
NRBC # BLD: 0 K/UL (ref 0–0.2)
P AERUGINOSA DNA BLD POS NAA+NON-PROBE: NOT DETECTED
PATH REV BLD -IMP: NORMAL
PHOSPHATE SERPL-MCNC: 2.3 MG/DL (ref 2.3–3.7)
PLATELET # BLD AUTO: 228 K/UL (ref 150–450)
PMV BLD AUTO: 8.5 FL (ref 9.4–12.3)
POTASSIUM SERPL-SCNC: 4.2 MMOL/L (ref 3.5–5.1)
PROT SERPL-MCNC: 6.6 G/DL (ref 6.3–8.2)
PROTEUS SP DNA BLD POS QL NAA+NON-PROBE: NOT DETECTED
RBC # BLD AUTO: 2.54 M/UL (ref 4.05–5.2)
RESISTANT GENE TARGETS: ABNORMAL
S AUREUS DNA BLD POS QL NAA+NON-PROBE: NOT DETECTED
S AUREUS+CONS DNA BLD POS NAA+NON-PROBE: DETECTED
S MARCESCENS DNA BLD POS NAA+NON-PROBE: NOT DETECTED
S PNEUM DNA BLD POS QL NAA+NON-PROBE: NOT DETECTED
S PYO DNA BLD POS QL NAA+NON-PROBE: NOT DETECTED
SALMONELLA SPECIES BY PCR: NOT DETECTED
SERVICE CMNT-IMP: ABNORMAL
SODIUM SERPL-SCNC: 146 MMOL/L (ref 136–145)
STAPHYLOCOCCUS EPIDERMIDIS BY PCR: NOT DETECTED
STAPHYLOCOCCUS LUGDUNENSIS BY PCR: NOT DETECTED
STENOTROPHOMONAS MALTOPHILIA BY PCR: NOT DETECTED
STREPTOCOCCUS DNA BLD POS NAA+NON-PROBE: NOT DETECTED
WBC # BLD AUTO: 8.6 K/UL (ref 4.3–11.1)

## 2022-06-20 PROCEDURE — 2580000003 HC RX 258: Performed by: FAMILY MEDICINE

## 2022-06-20 PROCEDURE — 2500000003 HC RX 250 WO HCPCS: Performed by: FAMILY MEDICINE

## 2022-06-20 PROCEDURE — 1100000000 HC RM PRIVATE

## 2022-06-20 PROCEDURE — 87150 DNA/RNA AMPLIFIED PROBE: CPT

## 2022-06-20 PROCEDURE — 6370000000 HC RX 637 (ALT 250 FOR IP): Performed by: FAMILY MEDICINE

## 2022-06-20 PROCEDURE — 6360000002 HC RX W HCPCS: Performed by: FAMILY MEDICINE

## 2022-06-20 PROCEDURE — 80053 COMPREHEN METABOLIC PANEL: CPT

## 2022-06-20 PROCEDURE — P9047 ALBUMIN (HUMAN), 25%, 50ML: HCPCS | Performed by: FAMILY MEDICINE

## 2022-06-20 PROCEDURE — 83735 ASSAY OF MAGNESIUM: CPT

## 2022-06-20 PROCEDURE — 82962 GLUCOSE BLOOD TEST: CPT

## 2022-06-20 PROCEDURE — 36415 COLL VENOUS BLD VENIPUNCTURE: CPT

## 2022-06-20 PROCEDURE — 84100 ASSAY OF PHOSPHORUS: CPT

## 2022-06-20 PROCEDURE — 85018 HEMOGLOBIN: CPT

## 2022-06-20 PROCEDURE — 85025 COMPLETE CBC W/AUTO DIFF WBC: CPT

## 2022-06-20 RX ADMIN — ALBUMIN (HUMAN) 25 G: 0.25 INJECTION, SOLUTION INTRAVENOUS at 02:18

## 2022-06-20 RX ADMIN — SODIUM CHLORIDE, PRESERVATIVE FREE 10 ML: 5 INJECTION INTRAVENOUS at 09:06

## 2022-06-20 RX ADMIN — CEFTRIAXONE 1000 MG: 1 INJECTION, POWDER, FOR SOLUTION INTRAMUSCULAR; INTRAVENOUS at 22:17

## 2022-06-20 RX ADMIN — SODIUM CHLORIDE: 9 INJECTION, SOLUTION INTRAVENOUS at 07:26

## 2022-06-20 RX ADMIN — CARVEDILOL 3.12 MG: 3.12 TABLET, FILM COATED ORAL at 16:54

## 2022-06-20 RX ADMIN — HEPARIN SODIUM 5000 UNITS: 5000 INJECTION, SOLUTION INTRAVENOUS; SUBCUTANEOUS at 22:17

## 2022-06-20 RX ADMIN — HEPARIN SODIUM 5000 UNITS: 5000 INJECTION, SOLUTION INTRAVENOUS; SUBCUTANEOUS at 06:25

## 2022-06-20 RX ADMIN — CARVEDILOL 3.12 MG: 3.12 TABLET, FILM COATED ORAL at 09:04

## 2022-06-20 RX ADMIN — CLOPIDOGREL BISULFATE 75 MG: 75 TABLET ORAL at 09:03

## 2022-06-20 RX ADMIN — ALOGLIPTIN 6.25 MG: 6.25 TABLET, FILM COATED ORAL at 09:04

## 2022-06-20 RX ADMIN — CETIRIZINE HYDROCHLORIDE 10 MG: 10 TABLET ORAL at 09:03

## 2022-06-20 RX ADMIN — HEPARIN SODIUM 5000 UNITS: 5000 INJECTION, SOLUTION INTRAVENOUS; SUBCUTANEOUS at 15:14

## 2022-06-20 RX ADMIN — ATORVASTATIN CALCIUM 80 MG: 40 TABLET, FILM COATED ORAL at 22:17

## 2022-06-20 RX ADMIN — VANCOMYCIN HYDROCHLORIDE 2000 MG: 10 INJECTION, POWDER, LYOPHILIZED, FOR SOLUTION INTRAVENOUS at 02:31

## 2022-06-20 RX ADMIN — SODIUM CHLORIDE, PRESERVATIVE FREE 10 ML: 5 INJECTION INTRAVENOUS at 22:20

## 2022-06-20 RX ADMIN — TUBERCULIN PURIFIED PROTEIN DERIVATIVE 5 UNITS: 5 INJECTION, SOLUTION INTRADERMAL at 11:49

## 2022-06-20 ASSESSMENT — PAIN SCALES - GENERAL
PAINLEVEL_OUTOF10: 0

## 2022-06-20 NOTE — CARE COORDINATION
Patient discussed during rounds. PT/OT evaluations orders. SW continuing to follow.      Yobani Morataya, [facility]    [address] [facility]

## 2022-06-20 NOTE — PROGRESS NOTES
Occupational Therapy Note:    Attempted to see patient this PM for occupational therapy evaluation session. Patient provided home environment information and was educated on role of therapy in the hospital but she continued to decline evaluation this afternoon. She was agreeable to attempt tomorrow. See flowsheets for home environment information. Will follow and re-attempt tomorrow as schedule permits/patient available.      Thank you,    Neo Collins, OT    Rehab Caseload Tracker

## 2022-06-20 NOTE — PROGRESS NOTES
VANCO DAILY FOLLOW UP NOTE  [address] [facility] Pharmacokinetic Monitoring Service - Vancomycin    Consulting Provider: Dr. Frederick Sever   Indication: bloodstream infection  Target Concentration: Goal AUC/AYUSH 400-600 mg*hr/L  Day of Therapy: 2  Additional Antimicrobials: ceftriaxone    Pertinent Laboratory Values: Wt Readings from Last 1 Encounters:   06/18/22 182 lb 1.6 oz (82.6 kg)     Temp Readings from Last 1 Encounters:   06/20/22 100 °F (37.8 °C) (Oral)     Recent Labs     06/18/22  1639 06/18/22  1802 06/19/22  0541 06/19/22  0817 06/20/22  0828   BUN 43*  --  40*  --  23   CREATININE 1.98*  --  1.99*  --  1.16*   WBC 9.7  --  9.0  --  8.6   PROCAL  --   --   --  <0.05  --    LACACIDPL  --  1.0  --  0.8  --      Estimated Creatinine Clearance: 42 mL/min (A) (based on SCr of 1.16 mg/dL (H)). No results found for: Pao Burgess    MRSA Nasal Swab: N/A. Non-respiratory infection. .      Assessment:  Date/Time Dose Concentration AUC         Note: Serum concentrations collected for AUC dosing may appear elevated if collected in close proximity to the dose administered, this is not necessarily an indication of toxicity    Plan:  Current dosing regimen is sub-therapeutic due to improved renal function  Increase dose to 1250mg q24h for predicted AUC/Tr of 519 / 16  Repeat vancomycin concentrations will be ordered as clinically appropriate   Pharmacy will continue to monitor patient and adjust therapy as indicated    Thank you for the consult,  Jan Selby.  Heather Young, [address] [facility]

## 2022-06-20 NOTE — PROGRESS NOTES
ordered BCx2 6/19 NGTD. Says she feels much better since the santos was placed, was in severe excruicating pain before. Says she sorry for hollering yesterday b/c of pain. She is more alert but still confused and doesn't remember having breakfast and primary RN reports she did get breakfast tray this AM.       Assessment & Plan:       ELFEGO on CKD3a 2/2 ATN and Post obstructive nephropathy   >> ELFEGO appears subacute with prior baseline <1, 5/28/22 Scr 1.74  Scr 1.9>1.16 on 6/20 (Bcr ~1) non oligouric, UOP 2.4L s/p santos   BL hydronephrosis, hydroureter, no stone or mass suspected bladder outlet. Consulted urology; appreciate recommendations   Maintain MAP >65 mm hg   Avoid anti-RAAS agents, nephrotoxic agents, and phosphate enemas   Renal Dosing   Strict I&O, Daily Weights, Daily BMP/CMP     Complicated UTI - 2/2 urinary retention. Ucx >100K GNR CFU/ml. Follow up final results. Cont Rocephin IV. Bilateral hydronephrosis -  Santos inserted on 6/19. Consulted urology. They suspect 2/2 persistently elevated intravesical pressure 2/2 OAB. Recommended leaving santos to gravity and follow up with Dr. Indigo Brown group to consider either mrbetriq or gemtesa for OAB. Positive blood culture - 1/2 BC sets GPC in aerobic bottle on 6/18, coag neg staph suggestive of skin contaminant. . Repeat BC 6/19 in process. Started on vanc overnight. DC for now pending final ucx     Hypernatremia - new this AM. SNa 146 and hyperchloremic. DC NS IVF. Repeat labs in AM. Encourage free water intake. Normocytic anemia  Worse. b12/folate/iron stores wnl. Hemolysis ruled out. hgb drop 7.0 from 9.3 on admission, likely hemodilutional. Repeat h/h @ 1500. Transfuse <7. Sepsis associated hypotension - responded to IVF, albumin, holding BP meds and abx. DC cIVF and albumin. Cont holding antihypertensives except reduced coreg dose. Acute metabolic encephalopathy - 2/2 sepsis and metabolic derangements. Should improve with treatment.  No focal deficits. T2DM w/ hypoglycemia   Plan: continue holding glargine and hold metformin. DPP4i likey okay but had pancreatitis in the past. Cover with SSI   Lab Results   Component Value Date    LABA1C 6.4 (H) 06/19/2022     Lab Results   Component Value Date     06/19/2022       Lymphocytosis - normalized. Follow up peripheral smear. Coronary artery disease involving native coronary artery of native heart  Cont. Plavix, statin. Reduced coreg 3.125 to reduce rebound tachycardia from abrupt discontinuation. Hold ACEI/ARB      Essential hypertension  Plan: hold norvasc, arb/acei, reduce coreg, hold hctz     Hyperlipidemia  Plan: statin     Obesity   Plan: adds to complexity     Debility - consult PT/OT, PPD        Discharge Planning:     likely stable in the next 1-2 days     Diet:  ADULT DIET; Regular  DVT PPx: heparin   Code status: Full Code    Hospital Problems:  Principal Problem:    Acute renal failure with acute tubular necrosis superimposed on chronic kidney disease (HCC)  Active Problems:    Complicated UTI (urinary tract infection)    Normocytic anemia    Sepsis associated hypotension (HCC)    Coronary artery disease involving native coronary artery of native heart    Essential hypertension    Hyperlipidemia    Obesity with body mass index 30 or greater    Hypertensive kidney disease with stage 3 chronic kidney disease (HCC)    Type 2 diabetes mellitus with hypoglycemia without coma, with long-term current use of insulin (HCC)    Lymphocytosis    Bilateral hydronephrosis    Obstructive nephropathy  Resolved Problems:    * No resolved hospital problems.  *      Objective:     Patient Vitals for the past 24 hrs:   Temp Pulse Resp BP SpO2   06/20/22 0743 100 °F (37.8 °C) 81 18 134/86 93 %   06/20/22 0328 98.7 °F (37.1 °C) 75 17 (!) 132/51 97 %   06/20/22 0026 98.7 °F (37.1 °C) 72 18 (!) 135/53 97 %   06/19/22 1953 99 °F (37.2 °C) 74 18 (!) 127/51 96 %   06/19/22 1455 98.1 °F (36.7 °C) 71 16 (!) 119/50 97 %   06/19/22 1118 98.1 °F (36.7 °C) 80 16 129/70 100 %       Oxygen Therapy  SpO2: 93 %  Pulse via Oximetry: 69 beats per minute  O2 Device: None (Room air)    Estimated body mass index is 33.31 kg/m² as calculated from the following:    Height as of this encounter: 5' 2\" (1.575 m). Weight as of this encounter: 182 lb 1.6 oz (82.6 kg). Intake/Output Summary (Last 24 hours) at 6/20/2022 1038  Last data filed at 6/20/2022 0910  Gross per 24 hour   Intake --   Output 3141 ml   Net -3141 ml         Physical Exam:     Blood pressure 134/86, pulse 81, temperature 100 °F (37.8 °C), temperature source Oral, resp. rate 18, height 5' 2\" (1.575 m), weight 182 lb 1.6 oz (82.6 kg), SpO2 93 %. Physical Exam  Vitals and nursing note reviewed. Constitutional:       General: She is not in acute distress. Appearance: She is obese. She is ill-appearing. Cardiovascular:      Rate and Rhythm: Normal rate and regular rhythm. Heart sounds: No murmur heard. Pulmonary:      Effort: Pulmonary effort is normal.      Breath sounds: Normal breath sounds. No wheezing or rales. Abdominal:      General: Abdomen is flat. Bowel sounds are normal. There is no distension. Palpations: Abdomen is soft. Tenderness: There is abdominal tenderness. Genitourinary:     Comments: Solano intact with clear/yellow urine   Musculoskeletal:      Cervical back: Neck supple. Skin:     General: Skin is dry. Neurological:      Mental Status: She is alert. She is disoriented and confused. Cranial Nerves: No dysarthria.    Psychiatric:         Mood and Affect: Mood normal.         Behavior: Behavior normal.         I have reviewed ordered lab tests and independently visualized imaging below:    Recent Labs:  Recent Results (from the past 48 hour(s))   CBC with Auto Differential    Collection Time: 06/18/22  4:39 PM   Result Value Ref Range    WBC 9.7 4.3 - 11.1 K/uL    RBC 3.41 (L) 4.05 - 5.2 M/uL    Hemoglobin 9.3 (L) 11.7 - 15.4 g/dL    Hematocrit 29.9 (L) 35.8 - 46.3 %    MCV 87.7 79.6 - 97.8 FL    MCH 27.3 26.1 - 32.9 PG    MCHC 31.1 (L) 31.4 - 35.0 g/dL    RDW 15.1 (H) 11.9 - 14.6 %    Platelets 311 296 - 490 K/uL    MPV 8.6 (L) 9.4 - 12.3 FL    nRBC 0.00 0.0 - 0.2 K/uL    Differential Type AUTOMATED      Seg Neutrophils 32 (L) 43 - 78 %    Lymphocytes 52 (H) 13 - 44 %    Monocytes 13 (H) 4.0 - 12.0 %    Eosinophils % 3 0.5 - 7.8 %    Basophils 1 0.0 - 2.0 %    Immature Granulocytes 0 0.0 - 5.0 %    Segs Absolute 3.1 1.7 - 8.2 K/UL    Absolute Lymph # 5.1 (H) 0.5 - 4.6 K/UL    Absolute Mono # 1.2 0.1 - 1.3 K/UL    Absolute Eos # 0.2 0.0 - 0.8 K/UL    Basophils Absolute 0.1 0.0 - 0.2 K/UL    Absolute Immature Granulocyte 0.0 0.0 - 0.5 K/UL   Comprehensive Metabolic Panel    Collection Time: 06/18/22  4:39 PM   Result Value Ref Range    Sodium 138 136 - 145 mmol/L    Potassium 4.6 3.5 - 5.1 mmol/L    Chloride 107 98 - 107 mmol/L    CO2 25 21 - 32 mmol/L    Anion Gap 6 (L) 7 - 16 mmol/L    Glucose 165 (H) 65 - 100 mg/dL    BUN 43 (H) 8 - 23 MG/DL    CREATININE 1.98 (H) 0.6 - 1.0 MG/DL    GFR  32 (L) >60 ml/min/1.73m2    GFR Non- 26 (L) >60 ml/min/1.73m2    Calcium 8.7 8.3 - 10.4 MG/DL    Total Bilirubin 0.6 0.2 - 1.1 MG/DL    ALT 54 12 - 65 U/L    AST 46 (H) 15 - 37 U/L    Alk Phosphatase 53 50 - 136 U/L    Total Protein 7.5 6.3 - 8.2 g/dL    Albumin 2.3 (L) 3.2 - 4.6 g/dL    Globulin 5.2 (H) 2.3 - 3.5 g/dL    Albumin/Globulin Ratio 0.4 (L) 1.2 - 3.5     EKG 12 Lead    Collection Time: 06/18/22  5:16 PM   Result Value Ref Range    Ventricular Rate 67 BPM    Atrial Rate 67 BPM    P-R Interval 174 ms    QRS Duration 92 ms    Q-T Interval 418 ms    QTc Calculation (Bazett) 441 ms    P Axis 46 degrees    R Axis -4 degrees    T Axis -3 degrees    Diagnosis       Sinus rhythm with sinus arrhythmia with occasional Premature ventricular   complexes     Lactic Acid    Collection Time: 06/18/22  6:02 PM Result Value Ref Range    Lactic Acid, Plasma 1.0 0.4 - 2.0 MMOL/L   Urinalysis w rflx microscopic    Collection Time: 06/18/22  6:02 PM   Result Value Ref Range    Color, UA GREEN      Appearance TURBID      Specific Gravity, UA 1.011 1.001 - 1.023      pH, Urine 7.5 5.0 - 9.0      Protein,  (A) NEG mg/dL    Glucose, UA Negative mg/dL    Ketones, Urine Negative NEG mg/dL    Bilirubin Urine Negative NEG      Blood, Urine LARGE (A) NEG      Urobilinogen, Urine 0.2 0.2 - 1.0 EU/dL    Nitrite, Urine Negative NEG      Leukocyte Esterase, Urine LARGE (A) NEG      WBC, UA >100 0 /hpf    RBC, UA >100 0 /hpf    Epithelial Cells UA 5-10 0 /hpf    BACTERIA, URINE 4+ (H) 0 /hpf    OTHER OBSERVATIONS RESULTS VERIFIED MANUALLY     Culture, Urine    Collection Time: 06/18/22  6:02 PM    Specimen: URINE-CLEAN CATCH    URINE   Result Value Ref Range    Special Requests NO SPECIAL REQUESTS      Culture (A)       >100,000 COLONIES/mL GRAM NEGATIVE RODS IDENTIFICATION AND SUSCEPTIBILITY TO FOLLOW    Culture        10,000 to 50,000 COLONIES/mL MIXED SKIN MARQUIS ISOLATED   Culture, Blood 1    Collection Time: 06/18/22  8:36 PM    Specimen: Blood   Result Value Ref Range    Special Requests LEFT  HAND        Culture NO GROWTH AFTER 8 HOURS     Culture, Blood 1    Collection Time: 06/18/22  8:39 PM    Specimen: Blood   Result Value Ref Range    Special Requests RIGHT  HAND        Gram stain GRAM POSITIVE COCCI      Gram stain AEROBIC BOTTLE POSITIVE      Gram stain       RESULTS VERIFIED PHONED TO AND READ BACK BY  ON 6/19 AT 2200 TO CHIDI GUEVARA RN    Culture CULTURE IN PROGRESS,FURTHER UPDATES TO FOLLOW      Culture        Refer to Blood Culture ID Panel Accession [id number]   Comprehensive Metabolic Panel w/ Reflex to MG    Collection Time: 06/19/22  5:41 AM   Result Value Ref Range    Sodium 141 136 - 145 mmol/L    Potassium 4.5 3.5 - 5.1 mmol/L    Chloride 109 (H) 98 - 107 mmol/L    CO2 28 21 - 32 mmol/L    Anion Gap 4 (L) 7 - 16 mmol/L    Glucose 78 65 - 100 mg/dL    BUN 40 (H) 8 - 23 MG/DL    CREATININE 1.99 (H) 0.6 - 1.0 MG/DL    GFR  32 (L) >60 ml/min/1.73m2    GFR Non- 26 (L) >60 ml/min/1.73m2    Calcium 8.0 (L) 8.3 - 10.4 MG/DL    Total Bilirubin 0.4 0.2 - 1.1 MG/DL    ALT 44 12 - 65 U/L    AST 34 15 - 37 U/L    Alk Phosphatase 45 (L) 50 - 136 U/L    Total Protein 6.3 6.3 - 8.2 g/dL    Albumin 2.0 (L) 3.2 - 4.6 g/dL    Globulin 4.3 (H) 2.3 - 3.5 g/dL    Albumin/Globulin Ratio 0.5 (L) 1.2 - 3.5     CBC with Auto Differential    Collection Time: 06/19/22  5:41 AM   Result Value Ref Range    WBC 9.0 4.3 - 11.1 K/uL    RBC 2.87 (L) 4.05 - 5.2 M/uL    Hemoglobin 7.9 (L) 11.7 - 15.4 g/dL    Hematocrit 25.7 (L) 35.8 - 46.3 %    MCV 89.5 79.6 - 97.8 FL    MCH 27.5 26.1 - 32.9 PG    MCHC 30.7 (L) 31.4 - 35.0 g/dL    RDW 14.9 (H) 11.9 - 14.6 %    Platelets 860 295 - 827 K/uL    MPV 8.6 (L) 9.4 - 12.3 FL    nRBC 0.00 0.0 - 0.2 K/uL    Differential Type AUTOMATED      Seg Neutrophils 25 (L) 43 - 78 %    Lymphocytes 59 (H) 13 - 44 %    Monocytes 12 4.0 - 12.0 %    Eosinophils % 3 0.5 - 7.8 %    Basophils 1 0.0 - 2.0 %    Immature Granulocytes 0 0.0 - 5.0 %    Segs Absolute 2.3 1.7 - 8.2 K/UL    Absolute Lymph # 5.4 (H) 0.5 - 4.6 K/UL    Absolute Mono # 1.1 0.1 - 1.3 K/UL    Absolute Eos # 0.2 0.0 - 0.8 K/UL    Basophils Absolute 0.1 0.0 - 0.2 K/UL    Absolute Immature Granulocyte 0.0 0.0 - 0.5 K/UL   POCT Glucose    Collection Time: 06/19/22  6:24 AM   Result Value Ref Range    POC Glucose 78 65 - 100 mg/dL    Performed by: Ruth    Lactate Dehydrogenase    Collection Time: 06/19/22  8:17 AM   Result Value Ref Range     110 - 210 U/L   Haptoglobin    Collection Time: 06/19/22  8:17 AM   Result Value Ref Range    Haptoglobin 423 (H) 30 - 200 mg/dL   D-Dimer, Quantitative    Collection Time: 06/19/22  8:17 AM   Result Value Ref Range    D-Dimer, Quant 1.88 (H) <0.56 ug/ml(FEU)   Ferritin Collection Time: 06/19/22  8:17 AM   Result Value Ref Range    Ferritin 193 8 - 388 NG/ML   Transferrin Saturation    Collection Time: 06/19/22  8:17 AM   Result Value Ref Range    Iron 36 35 - 150 ug/dL    TIBC 181 (L) 250 - 450 ug/dL    TRANSFERRIN SATURATION 20 %   Vitamin B12    Collection Time: 06/19/22  8:17 AM   Result Value Ref Range    Vitamin B-12 571 193 - 986 pg/mL   Folate    Collection Time: 06/19/22  8:17 AM   Result Value Ref Range    Folate 11.8 3.1 - 17.5 ng/mL   Lactic Acid    Collection Time: 06/19/22  8:17 AM   Result Value Ref Range    Lactic Acid, Plasma 0.8 0.4 - 2.0 MMOL/L   Hemoglobin A1C    Collection Time: 06/19/22  8:17 AM   Result Value Ref Range    Hemoglobin A1C 6.4 (H) 4.20 - 6.30 %    eAG 137 mg/dL   TSH with Reflex    Collection Time: 06/19/22  8:17 AM   Result Value Ref Range    TSH w Free Thyroid if Abnormal 1.58 UIU/ML   Procalcitonin    Collection Time: 06/19/22  8:17 AM   Result Value Ref Range    Procalcitonin <0.05 0.00 - 0.49 ng/mL   Cortisol AM, Total    Collection Time: 06/19/22  8:17 AM   Result Value Ref Range    Cortisol - AM 12.2 7 - 25 ug/dL   POCT Glucose    Collection Time: 06/19/22  4:16 PM   Result Value Ref Range    POC Glucose 112 (H) 65 - 100 mg/dL    Performed by: Panda    POCT Glucose    Collection Time: 06/19/22  9:11 PM   Result Value Ref Range    POC Glucose 213 (H) 65 - 100 mg/dL    Performed by: Ruth    Culture, Blood, PCR ID Panel    Collection Time: 06/20/22  4:35 AM    Specimen: Blood   Result Value Ref Range    ACCESSION NUMBER, [id number]     Enterococcus faecalis by PCR NOT DETECTED NOTDET      Enterococcus faecium by PCR NOT DETECTED NOTDET      Listeria monocytogenes by PCR NOT DETECTED NOTDET      STAPHYLOCOCCUS Detected (A) NOTDET      Staphylococcus Aureus NOT DETECTED NOTDET      Staphylococcus epidermidis by PCR NOT DETECTED NOTDET      Staphylococcus lugdunensis by PCR NOT DETECTED NOTDET      STREPTOCOCCUS NOT DETECTED NOTDET      Streptococcus agalactiae (Group B) NOT DETECTED NOTDET      Strep pneumoniae NOT DETECTED NOTDET      Strep pyogenes,(Grp. A) NOT DETECTED NOTDET      Acinetobacter calcoac baumannii complex by PCR NOT DETECTED NOTDET      Bacteroides fragilis by PCR NOT DETECTED NOTDET      Enterobacteriaceae by PCR NOT DETECTED NOTDET      Enterobacter cloacae complex by PCR NOT DETECTED NOTDET      Escherichia Coli NOT DETECTED NOTDET      Klebsiella aerogenes by PCR NOT DETECTED NOTDET      Klebsiella oxytoca by PCR NOT DETECTED NOTDET      Klebsiella pneumoniae group by PCR NOT DETECTED NOTDET      Proteus by PCR NOT DETECTED NOTDET      Salmonella species by PCR NOT DETECTED NOTDET      Serratia marcescens by PCR NOT DETECTED NOTDET      Haemophilus Influenzae by PCR NOT DETECTED NOTDET      Neisseria meningitidis by PCR NOT DETECTED NOTDET      Pseudomonas aeruginosa NOT DETECTED NOTDET      Stenotrophomonas maltophilia by PCR NOT DETECTED NOTDET      Candida albicans by PCR NOT DETECTED NOTDET      Candida auris by PCR NOT DETECTED NOTDET      Candida glabrata NOT DETECTED NOTDET      Candida krusei by PCR NOT DETECTED NOTDET      Candida parapsilosis by PCR NOT DETECTED NOTDET      Candida tropicalis by PCR NOT DETECTED NOTDET      Cryptococcus neoformans/gattii by PCR NOT DETECTED NOTDET      Resistant gene targets          [employer] test comment        False positive results may rarely occur.  Correlate with clinical,epidemiologic, and other laboratory findings   POCT Glucose    Collection Time: 06/20/22  5:54 AM   Result Value Ref Range    POC Glucose 123 (H) 65 - 100 mg/dL    Performed by: Ruth    Comprehensive Metabolic Panel    Collection Time: 06/20/22  8:28 AM   Result Value Ref Range    Sodium 146 (H) 136 - 145 mmol/L    Potassium 4.2 3.5 - 5.1 mmol/L    Chloride 116 (H) 98 - 107 mmol/L    CO2 25 21 - 32 mmol/L    Anion Gap 5 (L) 7 - 16 mmol/L    Glucose 145 (H) 65 - 100 mg/dL    BUN 23 8 - 23 MG/DL    CREATININE 1.16 (H) 0.6 - 1.0 MG/DL    GFR  59 (L) >60 ml/min/1.73m2    GFR Non- 49 (L) >60 ml/min/1.73m2    Calcium 8.3 8.3 - 10.4 MG/DL    Total Bilirubin 0.6 0.2 - 1.1 MG/DL    ALT 55 12 - 65 U/L    AST 50 (H) 15 - 37 U/L    Alk Phosphatase 46 (L) 50 - 136 U/L    Total Protein 6.6 6.3 - 8.2 g/dL    Albumin 3.2 3.2 - 4.6 g/dL    Globulin 3.4 2.3 - 3.5 g/dL    Albumin/Globulin Ratio 0.9 (L) 1.2 - 3.5     Magnesium    Collection Time: 06/20/22  8:28 AM   Result Value Ref Range    Magnesium 1.9 1.8 - 2.4 mg/dL   Phosphorus    Collection Time: 06/20/22  8:28 AM   Result Value Ref Range    Phosphorus 2.3 2.3 - 3.7 MG/DL   CBC with Auto Differential    Collection Time: 06/20/22  8:28 AM   Result Value Ref Range    WBC 8.6 4.3 - 11.1 K/uL    RBC 2.54 (L) 4.05 - 5.2 M/uL    Hemoglobin 7.0 (L) 11.7 - 15.4 g/dL    Hematocrit 22.3 (L) 35.8 - 46.3 %    MCV 87.8 79.6 - 97.8 FL    MCH 27.6 26.1 - 32.9 PG    MCHC 31.4 31.4 - 35.0 g/dL    RDW 14.9 (H) 11.9 - 14.6 %    Platelets 436 316 - 697 K/uL    MPV 8.5 (L) 9.4 - 12.3 FL    nRBC 0.00 0.0 - 0.2 K/uL    Differential Type AUTOMATED      Seg Neutrophils 47 43 - 78 %    Lymphocytes 39 13 - 44 %    Monocytes 10 4.0 - 12.0 %    Eosinophils % 3 0.5 - 7.8 %    Basophils 0 0.0 - 2.0 %    Immature Granulocytes 0 0.0 - 5.0 %    Segs Absolute 4.0 1.7 - 8.2 K/UL    Absolute Lymph # 3.4 0.5 - 4.6 K/UL    Absolute Mono # 0.9 0.1 - 1.3 K/UL    Absolute Eos # 0.2 0.0 - 0.8 K/UL    Basophils Absolute 0.0 0.0 - 0.2 K/UL    Absolute Immature Granulocyte 0.0 0.0 - 0.5 K/UL       Other Studies:  CT ABDOMEN PELVIS RENAL STONE Additional Contrast? Radiologist Recommendation   Final Result   Marked circumferential wall thickening with pericystic inflammation indicative   of an acute urinary tract infection. There is obstruction with moderate   bilateral hydronephrosis and hydroureter. No obstructing stone or extrinsic mass   noted.  Bladder outlet obstruction should be considered. XR CHEST PORTABLE   Final Result   No consolidation.           Current Meds:  Current Facility-Administered Medications   Medication Dose Route Frequency    alogliptin (NESINA) tablet 6.25 mg  6.25 mg Oral Daily    insulin lispro (HUMALOG) injection vial 0-8 Units  0-8 Units SubCUTAneous TID WC    insulin lispro (HUMALOG) injection vial 0-4 Units  0-4 Units SubCUTAneous Nightly    carvedilol (COREG) tablet 3.125 mg  3.125 mg Oral BID WC    [Held by provider] amLODIPine (NORVASC) tablet 5 mg  5 mg Oral Daily    atorvastatin (LIPITOR) tablet 80 mg  80 mg Oral Nightly    clopidogrel (PLAVIX) tablet 75 mg  75 mg Oral Daily    HYDROcodone homatropine (HYCODAN) 5-1.5 MG/5ML solution 5 mL  5 mL Oral Q4H PRN    [Held by provider] insulin glargine (LANTUS) injection vial 13 Units  13 Units SubCUTAneous Nightly    cetirizine (ZYRTEC) tablet 10 mg  10 mg Oral Daily    [Held by provider] losartan (COZAAR) tablet 50 mg  50 mg Oral Daily    sodium chloride flush 0.9 % injection 5-40 mL  5-40 mL IntraVENous 2 times per day    sodium chloride flush 0.9 % injection 5-40 mL  5-40 mL IntraVENous PRN    ondansetron (ZOFRAN-ODT) disintegrating tablet 4 mg  4 mg Oral Q8H PRN    Or    ondansetron (ZOFRAN) injection 4 mg  4 mg IntraVENous Q6H PRN    polyethylene glycol (GLYCOLAX) packet 17 g  17 g Oral Daily PRN    acetaminophen (TYLENOL) tablet 650 mg  650 mg Oral Q6H PRN    Or    acetaminophen (TYLENOL) suppository 650 mg  650 mg Rectal Q6H PRN    cefTRIAXone (ROCEPHIN) 1000 mg IVPB in NS 50ml minibag  1,000 mg IntraVENous Q24H    glucose chewable tablet 16 g  4 tablet Oral PRN    dextrose bolus 10% 125 mL  125 mL IntraVENous PRN    Or    dextrose bolus 10% 250 mL  250 mL IntraVENous PRN    glucagon (rDNA) injection 1 mg  1 mg IntraMUSCular PRN    dextrose 5 % solution  100 mL/hr IntraVENous PRN    heparin (porcine) injection 5,000 Units  5,000 Units SubCUTAneous 3 times per day       Signed:  Laurie Sanz DO DO    Part of this note may have been written by using a voice dictation software. The note has been proof read but may still contain some grammatical/other typographical errors.

## 2022-06-20 NOTE — PROGRESS NOTES
[facility] INSUFFICIENCY NOTE [address] [facility] Pharmacokinetic Monitoring Service - Vancomycin    Glen Lepe is a 78 y.o. female starting on vancomycin therapy for bloodstream infection. Pharmacy consulted by ROSANNA dela cruz for monitoring and adjustment. Target Concentration: Random level ? 15 mg/L  Additional Antimicrobials: rocehpine    Pertinent Laboratory Values:    Wt Readings from Last 1 Encounters:   06/18/22 182 lb 1.6 oz (82.6 kg)     Temp Readings from Last 1 Encounters:   06/19/22 99 °F (37.2 °C) (Oral)     Recent Labs     06/18/22  1639 06/18/22  1802 06/19/22  0541 06/19/22  0817   BUN 43*  --  40*  --    CREATININE 1.98*  --  1.99*  --    WBC 9.7  --  9.0  --    PROCAL  --   --   --  <0.05   LACACIDPL  --  1.0  --  0.8       No results found for: Velvet Pellet    MRSA Nasal Swab: Not respiratory infection    Plan:  Concentration-guided dosing due to renal impairment  Start vancomycin 2gm x1  Vancomycin concentrations will be ordered as clinically appropriate   Pharmacy will continue to monitor patient and adjust therapy as indicated    Thank you for the consult,  Mandi Thomas, [facility]

## 2022-06-21 PROBLEM — D72.820 LYMPHOCYTOSIS: Status: RESOLVED | Noted: 2022-06-19 | Resolved: 2022-06-21

## 2022-06-21 PROBLEM — R65.20 SEPSIS DUE TO ESCHERICHIA COLI WITH ENCEPHALOPATHY WITHOUT SEPTIC SHOCK (HCC): Status: ACTIVE | Noted: 2022-06-21

## 2022-06-21 PROBLEM — E66.9 CLASS 1 OBESITY WITH SERIOUS COMORBIDITY AND BODY MASS INDEX (BMI) OF 33.0 TO 33.9 IN ADULT: Chronic | Status: ACTIVE | Noted: 2021-03-17

## 2022-06-21 PROBLEM — R74.01 TRANSAMINITIS: Status: ACTIVE | Noted: 2022-06-21

## 2022-06-21 LAB
ALBUMIN SERPL-MCNC: 2.9 G/DL (ref 3.2–4.6)
ALBUMIN/GLOB SERPL: 0.8 {RATIO} (ref 1.2–3.5)
ALP SERPL-CCNC: 62 U/L (ref 50–136)
ALT SERPL-CCNC: 84 U/L (ref 12–65)
ANION GAP SERPL CALC-SCNC: 4 MMOL/L (ref 7–16)
AST SERPL-CCNC: 77 U/L (ref 15–37)
BACTERIA SPEC CULT: ABNORMAL
BILIRUB SERPL-MCNC: 0.6 MG/DL (ref 0.2–1.1)
BUN SERPL-MCNC: 13 MG/DL (ref 8–23)
CALCIUM SERPL-MCNC: 8.5 MG/DL (ref 8.3–10.4)
CHLORIDE SERPL-SCNC: 112 MMOL/L (ref 98–107)
CO2 SERPL-SCNC: 25 MMOL/L (ref 21–32)
CREAT SERPL-MCNC: 1.07 MG/DL (ref 0.6–1)
ERYTHROCYTE [DISTWIDTH] IN BLOOD BY AUTOMATED COUNT: 14.9 % (ref 11.9–14.6)
GLOBULIN SER CALC-MCNC: 3.8 G/DL (ref 2.3–3.5)
GLUCOSE BLD STRIP.AUTO-MCNC: 196 MG/DL (ref 65–100)
GLUCOSE BLD STRIP.AUTO-MCNC: 201 MG/DL (ref 65–100)
GLUCOSE BLD STRIP.AUTO-MCNC: 207 MG/DL (ref 65–100)
GLUCOSE BLD STRIP.AUTO-MCNC: 215 MG/DL (ref 65–100)
GLUCOSE SERPL-MCNC: 210 MG/DL (ref 65–100)
GRAM STN SPEC: ABNORMAL
HCT VFR BLD AUTO: 24.8 % (ref 35.8–46.3)
HGB BLD-MCNC: 7.6 G/DL (ref 11.7–15.4)
MAGNESIUM SERPL-MCNC: 1.7 MG/DL (ref 1.8–2.4)
MCH RBC QN AUTO: 26.9 PG (ref 26.1–32.9)
MCHC RBC AUTO-ENTMCNC: 30.6 G/DL (ref 31.4–35)
MCV RBC AUTO: 87.6 FL (ref 79.6–97.8)
NRBC # BLD: 0 K/UL (ref 0–0.2)
PLATELET # BLD AUTO: 257 K/UL (ref 150–450)
PMV BLD AUTO: 8.8 FL (ref 9.4–12.3)
POTASSIUM SERPL-SCNC: 4.3 MMOL/L (ref 3.5–5.1)
PROT SERPL-MCNC: 6.7 G/DL (ref 6.3–8.2)
RBC # BLD AUTO: 2.83 M/UL (ref 4.05–5.2)
SERVICE CMNT-IMP: ABNORMAL
SODIUM SERPL-SCNC: 141 MMOL/L (ref 136–145)
WBC # BLD AUTO: 9.6 K/UL (ref 4.3–11.1)

## 2022-06-21 PROCEDURE — 36415 COLL VENOUS BLD VENIPUNCTURE: CPT

## 2022-06-21 PROCEDURE — 2580000003 HC RX 258: Performed by: FAMILY MEDICINE

## 2022-06-21 PROCEDURE — 6360000002 HC RX W HCPCS: Performed by: FAMILY MEDICINE

## 2022-06-21 PROCEDURE — 97530 THERAPEUTIC ACTIVITIES: CPT

## 2022-06-21 PROCEDURE — 83735 ASSAY OF MAGNESIUM: CPT

## 2022-06-21 PROCEDURE — 97162 PT EVAL MOD COMPLEX 30 MIN: CPT

## 2022-06-21 PROCEDURE — 85027 COMPLETE CBC AUTOMATED: CPT

## 2022-06-21 PROCEDURE — 82962 GLUCOSE BLOOD TEST: CPT

## 2022-06-21 PROCEDURE — 6370000000 HC RX 637 (ALT 250 FOR IP): Performed by: FAMILY MEDICINE

## 2022-06-21 PROCEDURE — 1100000000 HC RM PRIVATE

## 2022-06-21 PROCEDURE — 80053 COMPREHEN METABOLIC PANEL: CPT

## 2022-06-21 PROCEDURE — 97165 OT EVAL LOW COMPLEX 30 MIN: CPT

## 2022-06-21 PROCEDURE — 97535 SELF CARE MNGMENT TRAINING: CPT

## 2022-06-21 RX ORDER — CARVEDILOL 6.25 MG/1
6.25 TABLET ORAL 2 TIMES DAILY WITH MEALS
Status: DISCONTINUED | OUTPATIENT
Start: 2022-06-21 | End: 2022-06-22 | Stop reason: HOSPADM

## 2022-06-21 RX ORDER — MAGNESIUM SULFATE 1 G/100ML
1000 INJECTION INTRAVENOUS ONCE
Status: COMPLETED | OUTPATIENT
Start: 2022-06-21 | End: 2022-06-21

## 2022-06-21 RX ORDER — INSULIN GLARGINE 100 [IU]/ML
5 INJECTION, SOLUTION SUBCUTANEOUS ONCE
Status: COMPLETED | OUTPATIENT
Start: 2022-06-21 | End: 2022-06-21

## 2022-06-21 RX ADMIN — CEFTRIAXONE 1000 MG: 1 INJECTION, POWDER, FOR SOLUTION INTRAMUSCULAR; INTRAVENOUS at 21:46

## 2022-06-21 RX ADMIN — AMLODIPINE BESYLATE 5 MG: 5 TABLET ORAL at 08:11

## 2022-06-21 RX ADMIN — INSULIN LISPRO 2 UNITS: 100 INJECTION, SOLUTION INTRAVENOUS; SUBCUTANEOUS at 11:02

## 2022-06-21 RX ADMIN — HEPARIN SODIUM 5000 UNITS: 5000 INJECTION, SOLUTION INTRAVENOUS; SUBCUTANEOUS at 05:56

## 2022-06-21 RX ADMIN — CARVEDILOL 3.12 MG: 3.12 TABLET, FILM COATED ORAL at 08:02

## 2022-06-21 RX ADMIN — INSULIN GLARGINE 5 UNITS: 100 INJECTION, SOLUTION SUBCUTANEOUS at 08:36

## 2022-06-21 RX ADMIN — ATORVASTATIN CALCIUM 80 MG: 40 TABLET, FILM COATED ORAL at 21:46

## 2022-06-21 RX ADMIN — HEPARIN SODIUM 5000 UNITS: 5000 INJECTION, SOLUTION INTRAVENOUS; SUBCUTANEOUS at 16:45

## 2022-06-21 RX ADMIN — CETIRIZINE HYDROCHLORIDE 10 MG: 10 TABLET ORAL at 08:06

## 2022-06-21 RX ADMIN — SODIUM CHLORIDE, PRESERVATIVE FREE 10 ML: 5 INJECTION INTRAVENOUS at 21:46

## 2022-06-21 RX ADMIN — CARVEDILOL 6.25 MG: 6.25 TABLET, FILM COATED ORAL at 16:45

## 2022-06-21 RX ADMIN — HEPARIN SODIUM 5000 UNITS: 5000 INJECTION, SOLUTION INTRAVENOUS; SUBCUTANEOUS at 21:46

## 2022-06-21 RX ADMIN — ALOGLIPTIN 6.25 MG: 6.25 TABLET, FILM COATED ORAL at 08:02

## 2022-06-21 RX ADMIN — CLOPIDOGREL BISULFATE 75 MG: 75 TABLET ORAL at 08:02

## 2022-06-21 RX ADMIN — SODIUM CHLORIDE, PRESERVATIVE FREE 10 ML: 5 INJECTION INTRAVENOUS at 08:03

## 2022-06-21 RX ADMIN — MAGNESIUM SULFATE HEPTAHYDRATE 1000 MG: 1 INJECTION, SOLUTION INTRAVENOUS at 08:33

## 2022-06-21 RX ADMIN — INSULIN GLARGINE 13 UNITS: 100 INJECTION, SOLUTION SUBCUTANEOUS at 21:44

## 2022-06-21 RX ADMIN — INSULIN LISPRO 2 UNITS: 100 INJECTION, SOLUTION INTRAVENOUS; SUBCUTANEOUS at 16:43

## 2022-06-21 ASSESSMENT — PAIN SCALES - GENERAL
PAINLEVEL_OUTOF10: 0
PAINLEVEL_OUTOF10: 0

## 2022-06-21 NOTE — PROGRESS NOTES
Hospitalist Progress Note   Admit Date:  2022  4:14 PM   Name:  Marline Britton   Age:  78 y.o. Sex:  female  :  1945   MRN:  [id number]   Room:  [facility]/    Presenting Complaint: Fatigue     Reason(s) for Admission: Transient alteration of awareness [R40.4]  Dehydration [E86.0]  Generalized weakness [R53.1]  ELFEGO (acute kidney injury) ([facility] [address] [N17.9]  Urinary tract infection with hematuria, site unspecified [N39.0, R31.9]  Acute kidney injury superimposed on chronic kidney disease ([facility] [address] [N17.9, N18.9]     Hospital Course & Interval History:   77 y/o F PMHx obesity, CAD s/p MICKI to RCA , T2DM, HTN, HLD, anemia, L pontine CVA in  with R sided weakness, pulmonary nodule who presented to ED on  with son and daughter who noted increasing weakness x 1 week and new confusion today, calling her daughter by the wrong name. She has been unable to perform self-care. In ED, SBP 60s initially, repeat BP 71/43. She rec'd IVF bolus. She required cath for urine collection which caused a lot of abdominal discomfort. Per nurse, urine loved very infected. Labs significant for Scr 1.98 BUN 43, hgb 9.3. LA 1.0 however collected 3 hours after initial labs and treatment. UA  Large blood, large LE. She was admitted for UTI and ELFEGO on IVF. Per chart review, suffered UTI in April with MDR E coli. CT a/p renal stone protocol on 22 -   Marked circumferential wall thickening with pericystic inflammation indicative   of an acute urinary tract infection. There is obstruction with moderate   bilateral hydronephrosis and hydroureter. No obstructing stone or extrinsic mass   noted. Bladder outlet obstruction should be considered. Ucx  >100K CFU/ml GNR   BC 1 of 4 bottles coag neg staph, likely contaminant s/p vanc x1 repeat [facility]  NGTD. Santos inserted on . Subjective/24hr Events (22): No acute events ON. Feels a lot better. Pain much improved with santos. Denies n/v. Tolerating PO. No fevers or chills. Assessment & Plan:       ELFEGO on CKD3a 2/2 ATN and Post obstructive nephropathy   >> ELFEGO appears subacute with prior baseline <1, 5/28/22 Scr 1.74  Scr 1.9>1.16 on 6/20 (Bcr ~1) non oligouric, UOP 2.4L s/p santos   BL hydronephrosis, hydroureter, no stone or mass suspected bladder outlet. Consulted urology; appreciate recommendations   Maintain MAP >65 mm hg   Avoid anti-RAAS agents, nephrotoxic agents, and phosphate enemas   Renal Dosing   Strict I&O, Daily Weights, Daily BMP/CMP     Sepsis 2/2 E coli complicated UTI - 2/2 urinary retention from OAB. Ucx >100K E coli CFU/ml. Follow up final results. H/o ESBL in 3/2021. Cont Rocephin IV. Bilateral hydronephrosis -  Santos inserted on 6/19. Consulted urology. They suspect 2/2 persistently elevated intravesical pressure 2/2 OAB. Recommended leaving santos to gravity and follow up with Dr. Hidalgo group to consider either mrbetriq or gemtesa for OAB. Positive blood culture - 1/2 BC sets GPC in aerobic bottle on 6/18, coag neg staph suggestive of skin contaminant. . Repeat BC 6/19 NGTD. S/p vanc x 1 dose. Hypernatremia -  Normalized after stopping NS cIVF and encouraging increased free water PO intake. Normocytic anemia  Improved after stopping IVF. Likely hemodilutional.  b12/folate/iron stores wnl. Hemolysis ruled out. hgb drop 7.0 from 9.3 on admission, likely hemodilutional. Repeat h/h @ 1500. Transfuse <7. S/p Sepsis associated hypotension - responded to IVF, albumin, holding BP meds and abx. DC cIVF and albumin. Cont holding antihypertensives except reduced coreg dose. Acute metabolic encephalopathy - 2/2 sepsis and metabolic derangements. Now improved. No focal deficits. T2DM w/ hypoglycemia - renal fc and PO intake improved. Restart glargine  Plan: continue hold metformin.  DPP4i likey okay but had pancreatitis in the past. Cover with SSI   Lab Results   Component Value Date    LABA1C 6.4 (H) 06/19/2022 Lab Results   Component Value Date     06/19/2022         Coronary artery disease involving native coronary artery of native heart  Cont. Plavix, statin. Reduced coreg 3.125 to reduce rebound tachycardia from abrupt discontinuation. Hold ACEI/ARB      Essential hypertension  Plan: hold norvasc, arb/acei, reduce coreg, hold hctz     Hyperlipidemia  Plan: statin     Obesity   Plan: adds to complexity     Debility - consult PT/OT, PPD        Discharge Planning:     likely home with [facility] tomorrow pending final cx results     Diet:  ADULT DIET;  Regular  DVT PPx: heparin   Code status: Full Code    Hospital Problems:  Principal Problem:    Sepsis due to Escherichia coli with encephalopathy without septic shock (HCC)  Active Problems:    Acute renal failure with acute tubular necrosis superimposed on chronic kidney disease (HCC)    Complicated UTI (urinary tract infection)    Normocytic anemia    Sepsis associated hypotension (HCC)    Coronary artery disease involving native coronary artery of native heart    Essential hypertension    Hyperlipidemia    Obesity with body mass index 30 or greater    Hypertensive kidney disease with stage 3 chronic kidney disease (HCC)    Type 2 diabetes mellitus with hypoglycemia without coma, with long-term current use of insulin (HCC)    Bilateral hydronephrosis    Obstructive nephropathy  Resolved Problems:    Lymphocytosis      Objective:     Patient Vitals for the past 24 hrs:   Temp Pulse Resp BP SpO2   06/21/22 1027 98.2 °F (36.8 °C) 76 16 (!) 157/103 96 %   06/21/22 0717 98.6 °F (37 °C) 72 18 (!) 153/62 96 %   06/21/22 0401 99.1 °F (37.3 °C) 82 17 (!) 138/50 95 %   06/21/22 0058 99 °F (37.2 °C) 91 18 128/63 94 %   06/20/22 1949 100.2 °F (37.9 °C) 88 17 135/71 93 %   06/20/22 1606 99 °F (37.2 °C) 71 18 (!) 125/58 94 %       Oxygen Therapy  SpO2: 96 %  Pulse via Oximetry: 69 beats per minute  O2 Device: None (Room air)    Estimated body mass index is 33.31 kg/m² as calculated from the following:    Height as of this encounter: 5' 2\" (1.575 m). Weight as of this encounter: 182 lb 1.6 oz (82.6 kg). Intake/Output Summary (Last 24 hours) at 6/21/2022 1127  Last data filed at 6/21/2022 0302  Gross per 24 hour   Intake --   Output 2475 ml   Net -2475 ml         Physical Exam:     Blood pressure (!) 157/103, pulse 76, temperature 98.2 °F (36.8 °C), temperature source Oral, resp. rate 16, height 5' 2\" (1.575 m), weight 182 lb 1.6 oz (82.6 kg), SpO2 96 %. Physical Exam  Vitals and nursing note reviewed. Constitutional:       General: She is not in acute distress. Appearance: She is obese. She is not ill-appearing. Cardiovascular:      Rate and Rhythm: Normal rate and regular rhythm. Heart sounds: No murmur heard. Pulmonary:      Effort: Pulmonary effort is normal.      Breath sounds: Normal breath sounds. No wheezing or rales. Abdominal:      General: Abdomen is flat. Bowel sounds are normal. There is no distension. Palpations: Abdomen is soft. Tenderness: There is no abdominal tenderness. Genitourinary:     Comments: Solano intact with blood tinged urine   Musculoskeletal:      Cervical back: Neck supple. Skin:     General: Skin is dry. Neurological:      Mental Status: She is alert and oriented to person, place, and time. Mental status is at baseline. Cranial Nerves: No dysarthria.    Psychiatric:         Mood and Affect: Mood normal.         Behavior: Behavior normal.         I have reviewed ordered lab tests and independently visualized imaging below:    Recent Labs:  Recent Results (from the past 48 hour(s))   POCT Glucose    Collection Time: 06/19/22  4:16 PM   Result Value Ref Range    POC Glucose 112 (H) 65 - 100 mg/dL    Performed by: Panda    POCT Glucose    Collection Time: 06/19/22  9:11 PM   Result Value Ref Range    POC Glucose 213 (H) 65 - 100 mg/dL    Performed by: Ruth    Culture, Blood, PCR ID Panel Collection Time: 06/20/22  4:35 AM    Specimen: Blood   Result Value Ref Range    ACCESSION NUMBER, [id number]     Enterococcus faecalis by PCR NOT DETECTED NOTDET      Enterococcus faecium by PCR NOT DETECTED NOTDET      Listeria monocytogenes by PCR NOT DETECTED NOTDET      STAPHYLOCOCCUS Detected (A) NOTDET      Staphylococcus Aureus NOT DETECTED NOTDET      Staphylococcus epidermidis by PCR NOT DETECTED NOTDET      Staphylococcus lugdunensis by PCR NOT DETECTED NOTDET      STREPTOCOCCUS NOT DETECTED NOTDET      Streptococcus agalactiae (Group B) NOT DETECTED NOTDET      Strep pneumoniae NOT DETECTED NOTDET      Strep pyogenes,(Grp. A) NOT DETECTED NOTDET      Acinetobacter calcoac baumannii complex by PCR NOT DETECTED NOTDET      Bacteroides fragilis by PCR NOT DETECTED NOTDET      Enterobacteriaceae by PCR NOT DETECTED NOTDET      Enterobacter cloacae complex by PCR NOT DETECTED NOTDET      Escherichia Coli NOT DETECTED NOTDET      Klebsiella aerogenes by PCR NOT DETECTED NOTDET      Klebsiella oxytoca by PCR NOT DETECTED NOTDET      Klebsiella pneumoniae group by PCR NOT DETECTED NOTDET      Proteus by PCR NOT DETECTED NOTDET      Salmonella species by PCR NOT DETECTED NOTDET      Serratia marcescens by PCR NOT DETECTED NOTDET      Haemophilus Influenzae by PCR NOT DETECTED NOTDET      Neisseria meningitidis by PCR NOT DETECTED NOTDET      Pseudomonas aeruginosa NOT DETECTED NOTDET      Stenotrophomonas maltophilia by PCR NOT DETECTED NOTDET      Candida albicans by PCR NOT DETECTED NOTDET      Candida auris by PCR NOT DETECTED NOTDET      Candida glabrata NOT DETECTED NOTDET      Candida krusei by PCR NOT DETECTED NOTDET      Candida parapsilosis by PCR NOT DETECTED NOTDET      Candida tropicalis by PCR NOT DETECTED NOTDET      Cryptococcus neoformans/gattii by PCR NOT DETECTED NOTDET      Resistant gene targets          [employer] test comment        False positive results may rarely occur.  Correlate with clinical,epidemiologic, and other laboratory findings   POCT Glucose    Collection Time: 06/20/22  5:54 AM   Result Value Ref Range    POC Glucose 123 (H) 65 - 100 mg/dL    Performed by: Ruth    Comprehensive Metabolic Panel    Collection Time: 06/20/22  8:28 AM   Result Value Ref Range    Sodium 146 (H) 136 - 145 mmol/L    Potassium 4.2 3.5 - 5.1 mmol/L    Chloride 116 (H) 98 - 107 mmol/L    CO2 25 21 - 32 mmol/L    Anion Gap 5 (L) 7 - 16 mmol/L    Glucose 145 (H) 65 - 100 mg/dL    BUN 23 8 - 23 MG/DL    CREATININE 1.16 (H) 0.6 - 1.0 MG/DL    GFR  59 (L) >60 ml/min/1.73m2    GFR Non- 49 (L) >60 ml/min/1.73m2    Calcium 8.3 8.3 - 10.4 MG/DL    Total Bilirubin 0.6 0.2 - 1.1 MG/DL    ALT 55 12 - 65 U/L    AST 50 (H) 15 - 37 U/L    Alk Phosphatase 46 (L) 50 - 136 U/L    Total Protein 6.6 6.3 - 8.2 g/dL    Albumin 3.2 3.2 - 4.6 g/dL    Globulin 3.4 2.3 - 3.5 g/dL    Albumin/Globulin Ratio 0.9 (L) 1.2 - 3.5     Magnesium    Collection Time: 06/20/22  8:28 AM   Result Value Ref Range    Magnesium 1.9 1.8 - 2.4 mg/dL   Phosphorus    Collection Time: 06/20/22  8:28 AM   Result Value Ref Range    Phosphorus 2.3 2.3 - 3.7 MG/DL   CBC with Auto Differential    Collection Time: 06/20/22  8:28 AM   Result Value Ref Range    WBC 8.6 4.3 - 11.1 K/uL    RBC 2.54 (L) 4.05 - 5.2 M/uL    Hemoglobin 7.0 (L) 11.7 - 15.4 g/dL    Hematocrit 22.3 (L) 35.8 - 46.3 %    MCV 87.8 79.6 - 97.8 FL    MCH 27.6 26.1 - 32.9 PG    MCHC 31.4 31.4 - 35.0 g/dL    RDW 14.9 (H) 11.9 - 14.6 %    Platelets 501 820 - 221 K/uL    MPV 8.5 (L) 9.4 - 12.3 FL    nRBC 0.00 0.0 - 0.2 K/uL    Differential Type AUTOMATED      Seg Neutrophils 47 43 - 78 %    Lymphocytes 39 13 - 44 %    Monocytes 10 4.0 - 12.0 %    Eosinophils % 3 0.5 - 7.8 %    Basophils 0 0.0 - 2.0 %    Immature Granulocytes 0 0.0 - 5.0 %    Segs Absolute 4.0 1.7 - 8.2 K/UL    Absolute Lymph # 3.4 0.5 - 4.6 K/UL    Absolute Mono # 0.9 0.1 - 1.3 K/UL Absolute Eos # 0.2 0.0 - 0.8 K/UL    Basophils Absolute 0.0 0.0 - 0.2 K/UL    Absolute Immature Granulocyte 0.0 0.0 - 0.5 K/UL   POCT Glucose    Collection Time: 06/20/22 11:04 AM   Result Value Ref Range    POC Glucose 171 (H) 65 - 100 mg/dL    Performed by: Luna Mcneil    Hemoglobin and Hematocrit    Collection Time: 06/20/22  3:37 PM   Result Value Ref Range    Hemoglobin 7.3 (L) 11.7 - 15.4 g/dL    Hematocrit 23.3 (L) 35.8 - 46.3 %   POCT Glucose    Collection Time: 06/20/22  4:24 PM   Result Value Ref Range    POC Glucose 141 (H) 65 - 100 mg/dL    Performed by: Elvi    POCT Glucose    Collection Time: 06/20/22  9:24 PM   Result Value Ref Range    POC Glucose 266 (H) 65 - 100 mg/dL    Performed by: Lauro    CBC    Collection Time: 06/21/22  4:54 AM   Result Value Ref Range    WBC 9.6 4.3 - 11.1 K/uL    RBC 2.83 (L) 4.05 - 5.2 M/uL    Hemoglobin 7.6 (L) 11.7 - 15.4 g/dL    Hematocrit 24.8 (L) 35.8 - 46.3 %    MCV 87.6 79.6 - 97.8 FL    MCH 26.9 26.1 - 32.9 PG    MCHC 30.6 (L) 31.4 - 35.0 g/dL    RDW 14.9 (H) 11.9 - 14.6 %    Platelets 822 338 - 701 K/uL    MPV 8.8 (L) 9.4 - 12.3 FL    nRBC 0.00 0.0 - 0.2 K/uL   Comprehensive Metabolic Panel    Collection Time: 06/21/22  4:54 AM   Result Value Ref Range    Sodium 141 136 - 145 mmol/L    Potassium 4.3 3.5 - 5.1 mmol/L    Chloride 112 (H) 98 - 107 mmol/L    CO2 25 21 - 32 mmol/L    Anion Gap 4 (L) 7 - 16 mmol/L    Glucose 210 (H) 65 - 100 mg/dL    BUN 13 8 - 23 MG/DL    CREATININE 1.07 (H) 0.6 - 1.0 MG/DL    GFR African American >60 >60 ml/min/1.73m2    GFR Non- 53 (L) >60 ml/min/1.73m2    Calcium 8.5 8.3 - 10.4 MG/DL    Total Bilirubin 0.6 0.2 - 1.1 MG/DL    ALT 84 (H) 12 - 65 U/L    AST 77 (H) 15 - 37 U/L    Alk Phosphatase 62 50 - 136 U/L    Total Protein 6.7 6.3 - 8.2 g/dL    Albumin 2.9 (L) 3.2 - 4.6 g/dL    Globulin 3.8 (H) 2.3 - 3.5 g/dL    Albumin/Globulin Ratio 0.8 (L) 1.2 - 3.5     Magnesium    Collection PRN    ondansetron (ZOFRAN-ODT) disintegrating tablet 4 mg  4 mg Oral Q8H PRN    Or    ondansetron (ZOFRAN) injection 4 mg  4 mg IntraVENous Q6H PRN    polyethylene glycol (GLYCOLAX) packet 17 g  17 g Oral Daily PRN    acetaminophen (TYLENOL) tablet 650 mg  650 mg Oral Q6H PRN    Or    acetaminophen (TYLENOL) suppository 650 mg  650 mg Rectal Q6H PRN    cefTRIAXone (ROCEPHIN) 1000 mg IVPB in NS 50ml minibag  1,000 mg IntraVENous Q24H    glucose chewable tablet 16 g  4 tablet Oral PRN    dextrose bolus 10% 125 mL  125 mL IntraVENous PRN    Or    dextrose bolus 10% 250 mL  250 mL IntraVENous PRN    glucagon (rDNA) injection 1 mg  1 mg IntraMUSCular PRN    dextrose 5 % solution  100 mL/hr IntraVENous PRN    heparin (porcine) injection 5,000 Units  5,000 Units SubCUTAneous 3 times per day       Signed:  Chuyita Nevarez DO, DO    Part of this note may have been written by using a voice dictation software. The note has been proof read but may still contain some grammatical/other typographical errors.

## 2022-06-21 NOTE — PROGRESS NOTES
OCCUPATIONAL THERAPY Initial Assessment, Daily Note and AM       OT Visit Days: 1  Acknowledge Orders  Time  OT Charge Capture  Rehab Caseload Tracker      Conchita Willson is a 78 y.o. female   PRIMARY DIAGNOSIS: Sepsis due to Escherichia coli with encephalopathy without septic shock (HCC)  Transient alteration of awareness [R40.4]  Dehydration [E86.0]  Generalized weakness [R53.1]  ELFEGO (acute kidney injury) ([facility] [address] [N17.9]  Urinary tract infection with hematuria, site unspecified [N39.0, R31.9]  Acute kidney injury superimposed on chronic kidney disease ([facility] [address] [N17.9, N18.9]       Reason for Referral: Generalized Muscle Weakness (M62.81)  Inpatient: Payor: MEDICARE / Plan: MEDICARE PART A AND B / Product Type: *No Product type* /     ASSESSMENT:     REHAB RECOMMENDATIONS:   Recommendation to date pending progress:  Setting:   Home Health Therapy    Equipment:     None     ASSESSMENT:  Ms. Martinez Proctor was admitted with above diagnosis and was seen in room with PT. Pt was pleasant and willing to work with therapy. Pt has a history of  CVA with right sided weakness. This is present but per pt it is in its normal state. Pt does use right arm and hand as assist but is noted to be weak with decreased coordination. Pt is noted to have new minimal generalized weakness and decreased balance. Pt would benefit from OT to maximize safety and independence with self care. Pt's family assist her at home as needed. Pt assisted up to edge of bed, second hospital gown donned with moderate assistance and socks with total assistance. Pt up to recliner with RW and minimal assistance of PT and OT. Pt was set-up with all needs and a snack (with OK from RN for sugar free) that she was able to feed herself after container management.       [address] [facility] [address] [id number] AM-PAC 6 Clicks Daily Activity Inpatient Short Form:    AM-PAC Daily Activity Inpatient   How much help for putting on and taking off regular lower body clothing?: Total  How much help for Bathing?: A Lot  How much help for Toileting?: A Lot  How much help for putting on and taking off regular upper body clothing?: A Lot  How much help for taking care of personal grooming?: A Little           SUBJECTIVE:     Ms. Rocío Gerber states she is ready to go home     Social/Functional Lives With: Daughter  Type of Home: House  Home Layout: Two level,Able to Live on Main level with bedroom/bathroom  Home Access: Level entry  Bathroom Toilet: Bedside commode  Bathroom Equipment: 3-in-1 commode  Home Equipment: Cane,Walker, [address] Help From: Family  ADL Assistance: Needs assistance  Bath: Supervision (sponge bathing)  Dressing:  (setup for dressing)  Toileting: Independent (at [facility])    OBJECTIVE:     Chandler Murphy / Deshawn Aiken / Young Taylor: IV    RESTRICTIONS/PRECAUTIONS:  Restrictions/Precautions: Fall Risk    PAIN: Jonelle Menjivar / O2:   Pre Treatment:          Post Treatment: no complaint of pain       Vitals          Oxygen            GROSS EVALUATION: INTACT IMPAIRED   (See Comments)   UE AROM [x] []   UE PROM [] []left WFL, right slightly decreased    Strength []  left WFL, right decreased      Posture / Balance [] Fair    Sensation []  intact per pt   Coordination []    Impaired    Tone []  impaired      Edema []    Activity Tolerance []  slight decreased from baseline      Hand Dominance R [] L []      COGNITION/  PERCEPTION: INTACT IMPAIRED   (See Comments)   Orientation []  intact for age    Vision []     Hearing []     Cognition  []  intact for age and condition    Perception []  intact     MOBILITY: I Mod I S SBA CGA Min Mod Max Total  NT x2 Comments:   Bed Mobility    Rolling [] [] [] [] [] [x] [] [] [] [] []    Supine to Sit [] [] [] [] [] [x] [] [] [] [] []    Scooting [] [] [] [] [] [x] [] [] [] [] []    Sit to Supine [] [] [] [] [] [] [] [] [] [] []    Transfers    Sit to Stand [] [] [] [] [] [x] [] [] [] [] [x]    Bed to Chair [] [] [] [] [] [x] [] [] [] [] [x]    Stand to Sit [] [] [] [] [] [x] [] [] [] [] [x] Tub/Shower [] [] [] [] [] [] [] [] [] [] []     Toilet [] [] [] [] [] [] [x] [] [] [] [] BSC    [] [] [] [] [] [] [] [] [] [] []    I=Independent, Mod I=Modified Independent, S=Supervision/Setup, SBA=Standby Assistance, CGA=Contact Guard Assistance, Min=Minimal Assistance, Mod=Moderate Assistance, Max=Maximal Assistance, Total=Total Assistance, NT=Not Tested    ACTIVITIES OF DAILY LIVING: I Mod I S SBA CGA Min Mod Max Total NT Comments   BASIC ADLs:              Upper Body Bathing  [] [] [] [] [] [x] [] [] [] []    Lower Body Bathing [] [] [] [] [] [] [] [x] [] []    Toileting [] [] [] [] [] [] [] [x] [] []    Upper Body Dressing [] [] [] [] [] [] [x] [] [] []    Lower Body Dressing [] [] [] [] [] [] [] [x] [] []    Feeding [] [x] [] [] [] [] [] [] [] []    Grooming [] [] [] [] [] [x] [] [] [] []    Personal Device Care [] [] [] [] [] [] [] [] [] []    Functional Mobility [] [] [] [] [] [] [x] [] [] []    I=Independent, Mod I=Modified Independent, S=Supervision/Setup, SBA=Standby Assistance, CGA=Contact Guard Assistance, Min=Minimal Assistance, Mod=Moderate Assistance, Max=Maximal Assistance, Total=Total Assistance, NT=Not Tested    PLAN:     FREQUENCY/DURATION   OT Plan of Care: 2 times/week for duration of hospital stay or until stated goals are met, whichever comes first.    ACUTE OCCUPATIONAL THERAPY GOALS:   (Developed with and agreed upon by patient and/or caregiver.)  1. Patient will perform grooming with supervision/set-up in supported seating. 2. Patient will perform Upper body dressing with minimal assistance/contact guard assist  3. Patient will perform lower body dressing with maximal assistance  4. Patient will perform upper and lower body bathing with moderate assistance . 5. Patient will perform toilet transfers with minimal assistance/contact guard assist to [facility].   6. Patient will participate in 45 + minutes of ADL/ therapeutic exercise/therapeutic activity with min rest breaks to increase

## 2022-06-21 NOTE — PLAN OF CARE
Problem: Discharge Planning  Goal: Discharge to home or other facility with appropriate resources  6/21/2022 1012 by Iveth Mahan RN  Outcome: Progressing  6/21/2022 0513 by Jairon Fernandez RN  Outcome: Progressing     Problem: Safety - Adult  Goal: Free from fall injury  6/21/2022 1012 by Iveth Mahan RN  Outcome: Progressing  6/21/2022 0513 by Jairon Fernandez RN  Outcome: Progressing     Problem: ABCDS Injury Assessment  Goal: Absence of physical injury  6/21/2022 1012 by Iveth Mahan RN  Outcome: Progressing  6/21/2022 0513 by Jairon Fernandez RN  Outcome: Progressing     Problem: Pain  Goal: Verbalizes/displays adequate comfort level or baseline comfort level  6/21/2022 0513 by Jairon Fernandez RN  Outcome: Progressing

## 2022-06-21 NOTE — CARE COORDINATION
Patient care plan reviewed in interdisciplinary rounds with the following disciplines: MD, nursing, case management, spiritual care, and therapy. CM met with patient to confirm discharge plans. Pt stated that she lives with her son but all her children check on her frequently and assist as needed. CM was informed by therapy that patient is likely back at her baseline, however, home health services are appropriate if desired by patient. CM spoke with patient who declined [facility] services. Discharge likely later today. No additional discharge planning needs noted however CM will continue to follow and will assist as needed.         06/19/22 1059   Service Assessment   Patient Orientation Alert and [address]   PCP Verified by CM Yes   Social/Functional History   Receives Help From [facility]

## 2022-06-21 NOTE — PROGRESS NOTES
PHYSICAL THERAPY Initial Assessment, Daily Note and AM  (Link to Caseload Tracking: PT Visit Days : 1  Acknowledge Orders  Time In/Out  PT Charge Capture  Rehab Caseload Tracker    Charles Pickering is a 78 y.o. female   PRIMARY DIAGNOSIS: Sepsis due to Escherichia coli with encephalopathy without septic shock (HCC)  Transient alteration of awareness [R40.4]  Dehydration [E86.0]  Generalized weakness [R53.1]  ELFEGO (acute kidney injury) ([facility] [N17.9]  Urinary tract infection with hematuria, site unspecified [N39.0, R31.9]  Acute kidney injury superimposed on chronic kidney disease ([facility] [N17.9, N18.9]       Reason for Referral: Generalized Muscle Weakness (M62.81)  Difficulty in walking, Not elsewhere classified (R26.2)  Other abnormalities of gait and mobility (R26.89)  Hemiplegia and hemiparesis following cerebral infarction affecting right dominant side (L33.730)  Inpatient: Payor: MEDICARE / Plan: MEDICARE PART A AND B / Product Type: *No Product type* /     ASSESSMENT:     REHAB RECOMMENDATIONS:   Recommendation to date pending progress:  [address] [place] [address] Therapy    Equipment:     None     ASSESSMENT:  Ms. Lilly Orantes is a 78 y.o. female with hx of old CVA admitted with worsening weakness. Upon PT evaluation, pt exhibits gross weakness (R>L), impaired balance, and reduced activity tolerance resulting in limited independence with functional mobility. At baseline, pt was requiring assistance from family for transfers, but was independent with bed mobility. Pt now requiring mod A for bed mobility and transfers with RW. Pt will require HHPT at discharge. Pt will continue to benefit from skilled PT to address above impairments and maximize functional independence prior to discharge.   [address] AM-PAC 6 Clicks Basic Mobility Inpatient Short Form  AM-PAC Mobility Inpatient   How much difficulty turning over in bed?: A Little  How much difficulty sitting down on / standing up from a chair with arms?: A Little  How much difficulty moving from lying on back to sitting on side of bed?: A Little  How much help from another person moving to and from a bed to a chair?: A Little  How much help from another person needed to walk in hospital room?: A Lot  How much help from another person for climbing 3-5 steps with a railing?: Total  AM-PAC Inpatient Mobility Raw Score : 15  AM-PAC Inpatient T-Scale Score : 39.45  Mobility Inpatient CMS 0-100% Score: 57.7  Mobility Inpatient CMS G-Code Modifier : CK    SUBJECTIVE:   Ms. Martinez Proctor states, \"I'm ready to go home. \"     Social/Functional Lives With: Daughter  Type of Home: House  Home Layout: Two level,Able to Live on Main level with bedroom/bathroom  Home Access: Level entry  Bathroom Toilet: Bedside commode  Bathroom Equipment: 3-in-1 commode  Home Equipment: Cane,Walker, [address] Help From: Family  ADL Assistance: Needs assistance  Bath: Supervision (sponge bathing)  Dressing:  (setup for dressing)  Toileting: Independent (at [facility])    OBJECTIVE:     PAIN: Ragland Cleaves / O2: PRECAUTION / [place] / DRAINS:   Pre Treatment:   Pain Assessment: None - Denies Pain  Pain Level: 0       Post Treatment: 0 Vitals        Oxygen  O2 Therapy: Room air   IV    RESTRICTIONS/PRECAUTIONS:  Restrictions/Precautions: Fall Risk                 GROSS EVALUATION: Intact Impaired (Comments):   AROM []  R ankle DF limited to neutral   PROM []    Strength [x]   R ankle DF 2/5, R knee extension 3/5, LLE grossly 4-/5. Balance []  Mod A to stand, heavy reliance on RW.    Posture [] Forward Head  Rounded Shoulders   Sensation [x]     Coordination [x]      Tone [x]     Edema [x]    Activity Tolerance []  Limited due to weakness    []      COGNITION/  PERCEPTION: Intact Impaired (Comments):   Orientation []  Disoriented to year   Vision [x]     Hearing [x]     Cognition  [x]       MOBILITY: I Mod I S SBA CGA Min Mod Max Total  NT x2 Comments:   Bed Mobility    Rolling [] [] [x] [] [] [] [] [] [] [] []    Supine to Sit [] [] [] [] [] [x] [] [] [] [] []    Scooting [] [] [] [] [] [x] [] [] [] [] []    Sit to Supine [] [] [] [] [] [] [] [] [] [x] []    Transfers    Sit to Stand [] [] [] [] [] [x] [] [] [] [] [x]    Bed to Chair [] [] [] [] [] [x] [] [] [] [] [x]    Stand to Sit [] [] [] [] [] [x] [] [] [] [] [x]     [] [] [] [] [] [] [] [] [] [] []    I=Independent, Mod I=Modified Independent, S=Supervision, SBA=Standby Assistance, CGA=Contact Guard Assistance,   Min=Minimal Assistance, Mod=Moderate Assistance, Max=Maximal Assistance, Total=Total Assistance, NT=Not Tested    GAIT: I Mod I S SBA CGA Min Mod Max Total  NT x2 Comments:   Level of Assistance [] [] [] [] [] [x] [] [] [] [] [x]    Distance 3 feet    DME Rolling Walker    Gait Quality Decreased kylah , Decreased step clearance, Decreased step length and Shuffling     Weightbearing Status Restrictions/Precautions  Restrictions/Precautions: Fall Risk    Stairs      I=Independent, Mod I=Modified Independent, S=Supervision, SBA=Standby Assistance, CGA=Contact Guard Assistance,   Min=Minimal Assistance, Mod=Moderate Assistance, Max=Maximal Assistance, Total=Total Assistance, NT=Not Tested    PLAN:   ACUTE PHYSICAL THERAPY GOALS:   (Developed with and agreed upon by patient and/or caregiver.)  Pt will perform supine to/from sit with mod I in 7 treatment days. Pt will perform sit to/from stand with mod I and LRAD in 7 treatment days. Pt will perform stand pivot with LRAD and mod I in 7 treatment days. Pt will be independent with HEP in 7 days.       FREQUENCY AND DURATION: Daily for duration of hospital stay or until stated goals are met, whichever comes first.    THERAPY PROGNOSIS: Good    PROBLEM LIST:   (Skilled intervention is medically necessary to address:)  Decreased ADL/Functional Activities  Decreased Activity Tolerance  Decreased AROM/PROM  Decreased Balance  Decreased Cognition  Decreased Gait Ability  Decreased Strength  Decreased Transfer Abilities INTERVENTIONS PLANNED:   (Benefits and precautions of physical therapy have been discussed with the patient.)  Self Care Training  Therapeutic Activity  Therapeutic Exercise/HEP  Neuromuscular Re-education  Gait Training  Education       TREATMENT:   EVALUATION: MODERATE COMPLEXITY: (Untimed Charge)    TREATMENT:   Therapeutic Activity (23 Minutes): Therapeutic activity included Rolling, Supine to Sit, Scooting, Lateral Scooting, Transfer Training, Ambulation on level ground, Sitting balance  and Standing balance to improve functional Activity tolerance, Balance, Coordination, Mobility, Strength and ROM. AFTER TREATMENT PRECAUTIONS: Bed/Chair Locked, Call light within reach, Chair, Needs within reach and RN notified    INTERDISCIPLINARY COLLABORATION:  RN/ PCT, PT/ PTA and OT/ PENA    EDUCATION: Education Given To: Patient  Education Provided: Role of Therapy;Plan of Care;Home Exercise Program;Precautions;Transfer Training;Energy Conservation; Fall Prevention Strategies;Orientation  Education Method: Demonstration;Verbal  Barriers to Learning: None  Education Outcome: Verbalized understanding;Demonstrated understanding    TIME IN/OUT:  Time In: 1010  Time Out: [place]  Minutes: [facility], PT

## 2022-06-22 VITALS
DIASTOLIC BLOOD PRESSURE: 54 MMHG | RESPIRATION RATE: 18 BRPM | BODY MASS INDEX: 33.51 KG/M2 | HEIGHT: 62 IN | HEART RATE: 66 BPM | OXYGEN SATURATION: 99 % | WEIGHT: 182.1 LBS | TEMPERATURE: 98.1 F | SYSTOLIC BLOOD PRESSURE: 133 MMHG

## 2022-06-22 LAB
ALBUMIN SERPL-MCNC: 2.7 G/DL (ref 3.2–4.6)
ALBUMIN/GLOB SERPL: 0.7 {RATIO} (ref 1.2–3.5)
ALP SERPL-CCNC: 58 U/L (ref 50–136)
ALT SERPL-CCNC: 66 U/L (ref 12–65)
ANION GAP SERPL CALC-SCNC: 5 MMOL/L (ref 7–16)
AST SERPL-CCNC: 39 U/L (ref 15–37)
BILIRUB SERPL-MCNC: 0.4 MG/DL (ref 0.2–1.1)
BUN SERPL-MCNC: 14 MG/DL (ref 8–23)
CALCIUM SERPL-MCNC: 8.5 MG/DL (ref 8.3–10.4)
CHLORIDE SERPL-SCNC: 109 MMOL/L (ref 98–107)
CO2 SERPL-SCNC: 26 MMOL/L (ref 21–32)
CREAT SERPL-MCNC: 1.04 MG/DL (ref 0.6–1)
ERYTHROCYTE [DISTWIDTH] IN BLOOD BY AUTOMATED COUNT: 14.9 % (ref 11.9–14.6)
GLOBULIN SER CALC-MCNC: 3.8 G/DL (ref 2.3–3.5)
GLUCOSE BLD STRIP.AUTO-MCNC: 140 MG/DL (ref 65–100)
GLUCOSE BLD STRIP.AUTO-MCNC: 165 MG/DL (ref 65–100)
GLUCOSE BLD STRIP.AUTO-MCNC: 200 MG/DL (ref 65–100)
GLUCOSE SERPL-MCNC: 159 MG/DL (ref 65–100)
HCT VFR BLD AUTO: 23.5 % (ref 35.8–46.3)
HGB BLD-MCNC: 7.5 G/DL (ref 11.7–15.4)
MCH RBC QN AUTO: 27.6 PG (ref 26.1–32.9)
MCHC RBC AUTO-ENTMCNC: 31.9 G/DL (ref 31.4–35)
MCV RBC AUTO: 86.4 FL (ref 79.6–97.8)
NRBC # BLD: 0 K/UL (ref 0–0.2)
PLATELET # BLD AUTO: 225 K/UL (ref 150–450)
PMV BLD AUTO: 8.4 FL (ref 9.4–12.3)
POTASSIUM SERPL-SCNC: 3.7 MMOL/L (ref 3.5–5.1)
PROT SERPL-MCNC: 6.5 G/DL (ref 6.3–8.2)
RBC # BLD AUTO: 2.72 M/UL (ref 4.05–5.2)
SERVICE CMNT-IMP: ABNORMAL
SODIUM SERPL-SCNC: 140 MMOL/L (ref 136–145)
WBC # BLD AUTO: 10.9 K/UL (ref 4.3–11.1)

## 2022-06-22 PROCEDURE — 80053 COMPREHEN METABOLIC PANEL: CPT

## 2022-06-22 PROCEDURE — 85027 COMPLETE CBC AUTOMATED: CPT

## 2022-06-22 PROCEDURE — 6360000002 HC RX W HCPCS: Performed by: FAMILY MEDICINE

## 2022-06-22 PROCEDURE — 97530 THERAPEUTIC ACTIVITIES: CPT

## 2022-06-22 PROCEDURE — 6370000000 HC RX 637 (ALT 250 FOR IP): Performed by: FAMILY MEDICINE

## 2022-06-22 PROCEDURE — 82962 GLUCOSE BLOOD TEST: CPT

## 2022-06-22 PROCEDURE — 36415 COLL VENOUS BLD VENIPUNCTURE: CPT

## 2022-06-22 PROCEDURE — 2580000003 HC RX 258: Performed by: FAMILY MEDICINE

## 2022-06-22 RX ORDER — CEPHALEXIN 500 MG/1
500 CAPSULE ORAL 4 TIMES DAILY
Qty: 12 CAPSULE | Refills: 0 | Status: SHIPPED | OUTPATIENT
Start: 2022-06-22 | End: 2022-06-25

## 2022-06-22 RX ADMIN — CEFTRIAXONE 1000 MG: 1 INJECTION, POWDER, FOR SOLUTION INTRAMUSCULAR; INTRAVENOUS at 15:41

## 2022-06-22 RX ADMIN — ALOGLIPTIN 6.25 MG: 6.25 TABLET, FILM COATED ORAL at 09:17

## 2022-06-22 RX ADMIN — CARVEDILOL 6.25 MG: 6.25 TABLET, FILM COATED ORAL at 09:17

## 2022-06-22 RX ADMIN — AMLODIPINE BESYLATE 5 MG: 5 TABLET ORAL at 09:17

## 2022-06-22 RX ADMIN — CETIRIZINE HYDROCHLORIDE 10 MG: 10 TABLET ORAL at 09:16

## 2022-06-22 RX ADMIN — CLOPIDOGREL BISULFATE 75 MG: 75 TABLET ORAL at 09:16

## 2022-06-22 RX ADMIN — HEPARIN SODIUM 5000 UNITS: 5000 INJECTION, SOLUTION INTRAVENOUS; SUBCUTANEOUS at 06:12

## 2022-06-22 RX ADMIN — HEPARIN SODIUM 5000 UNITS: 5000 INJECTION, SOLUTION INTRAVENOUS; SUBCUTANEOUS at 15:40

## 2022-06-22 RX ADMIN — SODIUM CHLORIDE, PRESERVATIVE FREE 10 ML: 5 INJECTION INTRAVENOUS at 15:42

## 2022-06-22 NOTE — DISCHARGE INSTR - DIET

## 2022-06-22 NOTE — DISCHARGE SUMMARY
Hospitalist Discharge Summary   Admit Date:  2022  4:14 PM   DC Note date: 2022  Name:  Eliot Brito   Age:  78 y.o. Sex:  female  :  1945   MRN:  [id number]   Room:  [facility]  PCP:  Jeanette Grant DO    Presenting Complaint: Fatigue     Initial Admission Diagnosis: Transient alteration of awareness [R40.4]  Dehydration [E86.0]  Generalized weakness [R53.1]  ELFEGO (acute kidney injury) ([address] [N17.9]  Urinary tract infection with hematuria, site unspecified [N39.0, R31.9]  Acute kidney injury superimposed on chronic kidney disease ([address] [N17.9, N18.9]     Problem List for this Hospitalization (present on admission):    Principal Problem:    Sepsis due to Escherichia coli with encephalopathy without septic shock ([address]  Active Problems:    Acute renal failure with acute tubular necrosis superimposed on chronic kidney disease ([address]    Complicated UTI (urinary tract infection)    Normocytic anemia    Sepsis associated hypotension (HCC)    Coronary artery disease involving native coronary artery of native heart    Essential hypertension    Hyperlipidemia    Class 1 obesity with serious comorbidity and body mass index (BMI) of 33.0 to 33.9 in adult    Hypertensive kidney disease with stage 3 chronic kidney disease (HCC)    Type 2 diabetes mellitus with hypoglycemia without coma, with long-term current use of insulin (HCC)    Bilateral hydronephrosis    Obstructive nephropathy    Transaminitis    OAB (overactive bladder)  Resolved Problems:    Lymphocytosis    Did Patient have Sepsis (YES OR NO): yes    Hospital Course:  79 y/o F PMHx obesity, CAD s/p MICKI to RCA , T2DM, HTN, HLD, anemia, L pontine CVA in  with R sided weakness, pulmonary nodule who presented to ED on  with son and daughter who noted increasing weakness x 1 week and new confusion today, calling her daughter by the wrong name. She has been unable to perform self-care. In ED, SBP 60s initially, repeat BP 71/43.  She rec'd IVF bolus. She required cath for urine collection which caused a lot of abdominal discomfort. Per nurse, urine loved very infected. Labs significant for Scr 1.98 BUN 43, hgb 9.3. LA 1.0 however collected 3 hours after initial labs and treatment. UA  Large blood, large LE. She was admitted for UTI and ELFEGO on IVF. Per chart review, suffered UTI in April with MDR E coli.      CT a/p renal stone protocol on 6/19/22 -   Marked circumferential wall thickening with pericystic inflammation indicative   of an acute urinary tract infection. There is obstruction with moderate   bilateral hydronephrosis and hydroureter. No obstructing stone or extrinsic mass   noted. Bladder outlet obstruction should be considered.      Ucx 6/18 >100K CFU/ml Ecoli   BC 1 of 4 bottles coag neg staph, likely contaminant s/p vanc x1 repeat [facility] 6/19 NGTD.      ELFEGO on CKD3a 2/2 ATN and Post obstructive nephropathy   >> ELFEGO appears subacute with prior baseline <1, 5/28/22 Scr 1.74  Scr 1.9>1.16>1.04 on 6/22 (Bcr ~1) non oligouric   BL hydronephrosis, hydroureter, no stone or mass suspected bladder outlet.      Sepsis 2/2 E coli complicated UTI - 2/2 urinary retention from OAB. Ucx >100K E coli CFU/ml. S/p  Rocephin IV x 5 doses. Complete course of abx with keflex at home.      Bilateral hydronephrosis -  Santos inserted on 6/19. Consulted urology. They suspect 2/2 persistently elevated intravesical pressure 2/2 OAB. Recommended leaving santos to gravity and follow up with Dr. Caban Client group to consider either mrbetriq or gemtesa for OAB.      Positive blood culture - 1/2 BC sets GPC in aerobic bottle on 6/18, coag neg staph suggestive of skin contaminant. . Repeat BC 6/19 NGTD.  S/p vanc x 1 dose.      Hypernatremia -  Normalized after stopping NS cIVF and encouraging increased free water PO intake.       Normocytic anemia  b12/folate/iron stores wnl.     S/p Sepsis associated hypotension - responded to IVF, albumin, holding BP meds and abx.     Acute metabolic encephalopathy - 2/2 sepsis and metabolic derangements. Now back to baseline  No focal deficits.      T2DM w/ hypoglycemia - renal fc and PO intake improved as did her glucose. Restart glargine and home meds         Lab Results   Component Value Date     LABA1C 6.4 (H) 06/19/2022            Lab Results   Component Value Date      06/19/2022            Coronary artery disease involving native coronary artery of native heart  Cont. Plavix, statin. Reduced coreg 3.125 to reduce rebound tachycardia from abrupt discontinuation. Hold ACEI/ARB      Essential hypertension  Resume home meds      Hyperlipidemia  Plan: statin     Obesity   Plan: adds to complexity      Debility - PT/OT         Disposition: home health   Diet: ADULT DIET; Regular  Code Status: Full Code    Follow Ups:   Follow-up Information     Mu Sol DO. Schedule an appointment as soon as possible for a   visit in 3 days. Specialty: Nurse Practitioner  Why: hospital discharge follow up  Contact information:  [address] [place] [address]  Gabriela Gonzales MD. Schedule an appointment as soon as possible for a   visit in 1 week. Specialty: Urology  Why: urinary rention   Contact information:  [address]  [address] [place] [address]  [phone]                       Follow up labs/diagnostics (ultimately defer to outpatient provider):  Cmp/bmp, mag, cbc      Time spent in patient discharge and coordination 44 minutes. Plan was discussed with patient. All questions answered. Patient was stable at time of discharge. Instructions given to call a physician or return if any concerns.     Current Discharge Medication List      START taking these medications    Details   cephALEXin (KEFLEX) 500 MG capsule Take 1 capsule by mouth 4 times daily for 3 days  Qty: 12 capsule, Refills: 0         CONTINUE these medications which have NOT CHANGED    Details   acetaminophen (TYLENOL) 500 MG tablet Take 500 mg by mouth every 6 hours as needed      amLODIPine (NORVASC) 5 MG tablet Take 5 mg by mouth daily      aspirin 81 MG EC tablet Take 81 mg by mouth 2 times daily      !! atorvastatin (LIPITOR) 40 MG tablet atorvastatin 40 mg tablet   Take 1 tablet every day by oral route. !! atorvastatin (LIPITOR) 80 MG tablet atorvastatin 80 mg tablet   Take 1 tablet every day by oral route for 90 days. carvedilol (COREG) 6.25 MG tablet carvedilol 6.25 mg tablet   Take 1 tablet twice a day by oral route for 90 days. vitamin D (CHOLECALCIFEROL) 25 MCG (1000 UT) TABS tablet Take 1,000 Units by mouth daily      clopidogrel (PLAVIX) 75 MG tablet clopidogrel 75 mg tablet   TAKE 1 TABLET DAILY. hydroCHLOROthiazide (HYDRODIURIL) 25 MG tablet hydrochlorothiazide 25 mg tablet   Take 1 tablet every day by oral route for 30 days. HYDROcodone homatropine (HYCODAN) 5-1.5 MG/5ML solution Take 5 mLs by mouth 4 times daily as needed. insulin glargine (LANTUS) 100 UNIT/ML injection vial Inject 13 Units into the skin      loratadine (CLARITIN) 10 MG tablet loratadine 10 mg tablet   Take 1 tablet every day by oral route. losartan (COZAAR) 50 mg tablet losartan 50 mg tablet      metFORMIN (GLUCOPHAGE) 1000 MG tablet Take 1,000 mg by mouth 2 times daily (with meals)      ondansetron (ZOFRAN-ODT) 4 MG disintegrating tablet Take 4 mg by mouth 3 times daily as needed      SITagliptin (JANUVIA) 100 MG tablet Januvia 100 mg tablet   Take 1 tablet every day by oral route for 90 days. !! - Potential duplicate medications found. Please discuss with provider.       STOP taking these medications       ciprofloxacin (CIPRO) 250 mg tablet Comments:   Reason for Stopping:         fluconazole (DIFLUCAN) 150 MG tablet Comments:   Reason for Stopping:         lisinopril (PRINIVIL;ZESTRIL) 40 MG tablet Comments:   Reason for Stopping:         lovastatin (MEVACOR) 40 MG tablet Comments:   Reason for Stopping:         methylPREDNISolone (MEDROL DOSEPACK) 4 MG tablet Comments:   Reason for Stopping:         metoprolol (LOPRESSOR) 100 MG tablet Comments:   Reason for Stopping:         simvastatin (ZOCOR) 80 MG tablet Comments:   Reason for Stopping:               Procedures done this admission:  * No surgery found *    Consults this admission:  IP CONSULT TO UROLOGY    Echocardiogram results:  No results found for this or any previous visit. Diagnostic Imaging/Tests:   XR CHEST PORTABLE    Result Date: 6/18/2022  AP PORTABLE CHEST X-RAY 6/18/2022 5:06 PM HISTORY: Syncope  increasing weakness. COMPARISON: October 19, 2011 FINDINGS:  Lung volumes are diminished. There is no lobar consolidation, pleural effusions or pulmonary edema. No consolidation. CT ABDOMEN PELVIS RENAL STONE Additional Contrast? Radiologist Recommendation    Result Date: 6/19/2022  CT of the abdomen and pelvis without contrast. CLINICAL INDICATION: Dysuria, urinary tract infection PROCEDURE: Serial thin-section axial images obtained from the upper abdomen through the proximal femurs without the administration of intravenous or oral contrast.  Radiation dose reduction techniques were used for this study. Our CT scanners use one or all of the following: Automated exposure control, adjusted of the mA and/or kV according to patient size, iterative reconstruction COMPARISON: CT abdomen and pelvis dated 11/29/2010 FINDINGS:  Evaluation of the hollow and solid viscera is limited by the lack of intravenous contrast. CT ABDOMEN: There is bilateral moderate hydronephrosis and hydroureter. No renal or ureteral calculi appreciated. Cholecystectomy clips are present. The adrenal glands are normal. CT PELVIS: There is marked circumferential bladder wall thickening with pericystic inflammation. Acute cystitis is suspected. There is debris layering dependently within the bladder lumen. No bladder stones noted.  No distal ureteral or UVJ stones. There is no inguinal hernia. No aggressive osseous lesions identified. Marked circumferential wall thickening with pericystic inflammation indicative of an acute urinary tract infection. There is obstruction with moderate bilateral hydronephrosis and hydroureter. No obstructing stone or extrinsic mass noted. Bladder outlet obstruction should be considered. Labs: Results:       BMP, Mg, Phos Recent Labs     06/20/22  0828 06/21/22  0454 06/22/22  0510   * 141 140   K 4.2 4.3 3.7   * 112* 109*   CO2 25 25 26   BUN 23 13 14   MG 1.9 1.7*  --    PHOS 2.3  --   --       CBC Recent Labs     06/20/22  0828 06/20/22  0828 06/20/22  1537 06/21/22  0454 06/22/22  0510   WBC 8.6  --   --  9.6 10.9   RBC 2.54*  --   --  2.83* 2.72*   HGB 7.0*   < > 7.3* 7.6* 7.5*   HCT 22.3*   < > 23.3* 24.8* 23.5*     --   --  257 225    < > = values in this interval not displayed.       LFT Recent Labs     06/20/22  0828 06/21/22  0454 06/22/22  0510   ALT 55 84* 66*   GLOB 3.4 3.8* 3.8*      Cardiac Testing No results found for: BNP, CPK, RCK1, CKMB   Coagulation Tests No results found for: INR, APTT   A1c No results found for: HBA1C   Lipid Panel No results found for: CHOL, CHOLPOCT, CHOLX, CHLST, CHOLV, 761306, HDL, HDLC, LDL, LDLC, 500292, VLDLC, VLDL, TGLX, TRIGL   Thyroid Panel No results found for: TSH, T4, FT4     Most Recent UA Lab Results   Component Value Date    MUCUS 0 04/07/2022    UCOM RESULTS VERIFIED MANUALLY 06/18/2022          All Labs from Last 24 Hrs:  Recent Results (from the past 24 hour(s))   POCT Glucose    Collection Time: 06/21/22  4:23 PM   Result Value Ref Range    POC Glucose 207 (H) 65 - 100 mg/dL    Performed by: Lynn    POCT Glucose    Collection Time: 06/21/22  9:16 PM   Result Value Ref Range    POC Glucose 215 (H) 65 - 100 mg/dL    Performed by: Fili    CBC    Collection Time: 06/22/22  5:10 AM   Result Value Ref Range WBC 10.9 4.3 - 11.1 K/uL    RBC 2.72 (L) 4.05 - 5.2 M/uL    Hemoglobin 7.5 (L) 11.7 - 15.4 g/dL    Hematocrit 23.5 (L) 35.8 - 46.3 %    MCV 86.4 79.6 - 97.8 FL    MCH 27.6 26.1 - 32.9 PG    MCHC 31.9 31.4 - 35.0 g/dL    RDW 14.9 (H) 11.9 - 14.6 %    Platelets 747 123 - 470 K/uL    MPV 8.4 (L) 9.4 - 12.3 FL    nRBC 0.00 0.0 - 0.2 K/uL   Comprehensive Metabolic Panel    Collection Time: 06/22/22  5:10 AM   Result Value Ref Range    Sodium 140 136 - 145 mmol/L    Potassium 3.7 3.5 - 5.1 mmol/L    Chloride 109 (H) 98 - 107 mmol/L    CO2 26 21 - 32 mmol/L    Anion Gap 5 (L) 7 - 16 mmol/L    Glucose 159 (H) 65 - 100 mg/dL    BUN 14 8 - 23 MG/DL    CREATININE 1.04 (H) 0.6 - 1.0 MG/DL    GFR African American >60 >60 ml/min/1.73m2    GFR Non- 55 (L) >60 ml/min/1.73m2    Calcium 8.5 8.3 - 10.4 MG/DL    Total Bilirubin 0.4 0.2 - 1.1 MG/DL    ALT 66 (H) 12 - 65 U/L    AST 39 (H) 15 - 37 U/L    Alk Phosphatase 58 50 - 136 U/L    Total Protein 6.5 6.3 - 8.2 g/dL    Albumin 2.7 (L) 3.2 - 4.6 g/dL    Globulin 3.8 (H) 2.3 - 3.5 g/dL    Albumin/Globulin Ratio 0.7 (L) 1.2 - 3.5     POCT Glucose    Collection Time: 06/22/22  6:56 AM   Result Value Ref Range    POC Glucose 140 (H) 65 - 100 mg/dL    Performed by: Lauro        Allergies   Allergen Reactions    Codeine Nausea And Vomiting    Sulfa Antibiotics Nausea And Vomiting and Hives     Daughter reports but unsure of reaction       Immunization History   Administered Date(s) Administered    PPD Test 06/20/2022       Recent Vital Data:  Patient Vitals for the past 24 hrs:   Temp Pulse Resp BP SpO2   06/22/22 0813 98.4 °F (36.9 °C) 68 18 (!) 128/56 99 %   06/22/22 0452 98.6 °F (37 °C) 71 18 (!) 134/58 95 %   06/22/22 0026 98.4 °F (36.9 °C) 70 18 (!) 119/53 95 %   06/21/22 1914 98.8 °F (37.1 °C) 68 18 (!) 152/54 95 %   06/21/22 1813 98.4 °F (36.9 °C) 71 16 (!) 158/68 96 %   06/21/22 1609 -- (!) 104 -- -- 97 %       Oxygen Therapy  SpO2: 99 %  Pulse via Oximetry: 69 beats per minute  O2 Device: None (Room air)    Estimated body mass index is 33.31 kg/m² as calculated from the following:    Height as of this encounter: 5' 2\" (1.575 m). Weight as of this encounter: 182 lb 1.6 oz (82.6 kg). Intake/Output Summary (Last 24 hours) at 6/22/2022 1045  Last data filed at 6/22/2022 0704  Gross per 24 hour   Intake 480 ml   Output 1400 ml   Net -920 ml         Physical Exam:    General:    Well nourished. obese  No overt distress  Head:  Normocephalic, atraumatic  Eyes:  Sclerae appear normal.  Pupils equally round. HENT:  Nares appear normal, no drainage. Moist mucous membranes  Neck:  No restricted ROM. Trachea midline  CV:   RRR. No m/r/g. No JVD  Lungs:   CTAB. No wheezing, rhonchi, or rales. Even, unlabored  Abdomen:   Soft, nontender, nondistended. Extremities: Warm and dry. No cyanosis or clubbing. No edema. Skin:     No rashes. Normal coloration  Neuro:  CN II-XII grossly intact. Psych:  Normal mood and affect. Signed:  Reina Vanegas, DO, DO    Part of this note may have been written by using a voice dictation software. The note has been proof read but may still contain some grammatical/other typographical errors.

## 2022-06-22 NOTE — PROGRESS NOTES
After visit summary reviewed with patient and children. Patient made aware of whom to notify in case of emergency. Verbalized understanding. Opportunity given to review information and ask questions. Peripheral IV removed with catheter tip intact.

## 2022-06-22 NOTE — CARE COORDINATION
Patient care plan reviewed in interdisciplinary rounds. Pt is medically stable and ready for discharge home with family today. She will discharge home with a santos catheter. CM met with patient to confirm discharge plans. PT reported that patient was likely back at her baseline but agreed that home health PT would be beneficial if pt is in agreement. Pt declined home health services on 6/21 and again on 6/22. No additional discharge planning needs noted. 06/22/22 1140   Service Assessment   Patient Orientation Alert and Oriented;Person;Place;Situation;Self   Cognition Alert   History Provided By Patient   Primary Carlo [address]   PCP Verified by CM Yes   Prior Functional Level Assistance with the following:;Bathing;Dressing; Toileting;Cooking;Housework; Shopping;Mobility   Current Functional Level Assistance with the following:;Bathing;Dressing; Toileting;Cooking;Housework; Shopping;Mobility   Can patient return to prior living arrangement Yes   Ability to make needs known: Good   Family able to assist with home care needs: Yes   Social/Functional History   Lives With Son   Type of [address] [facility] Help From Family   ADL Assistance Needs assistance   Ambulation Assistance Needs assistance   Transfer Assistance Needs assistance   Active  No   Discharge Planning   Type of [address] Medications No   Type of [address] PT   [facility]   Transition of Care Consult (CM Consult) Discharge Planning   Services At/After Discharge None   Mode of Transport at Discharge Self   Condition of Participation: Discharge [address] for Transition of Care is related to the following treatment goals: return to prior level of care

## 2022-06-22 NOTE — PROGRESS NOTES
PHYSICAL THERAPY Daily Note and AM  (Link to Caseload Tracking: PT Visit Days : 2  Time In/Out PT Charge Capture  Rehab Caseload Tracker  Orders      Alonso Yeboah is a 78 y.o. female   PRIMARY DIAGNOSIS: Sepsis due to Escherichia coli with encephalopathy without septic shock (HCC)  Transient alteration of awareness [R40.4]  Dehydration [E86.0]  Generalized weakness [R53.1]  ELFEGO (acute kidney injury) ([facility] [address] [N17.9]  Urinary tract infection with hematuria, site unspecified [N39.0, R31.9]  Acute kidney injury superimposed on chronic kidney disease ([facility] [address] [N17.9, N18.9]       Inpatient: Payor: MEDICARE / Plan: MEDICARE PART A AND B / Product Type: *No Product type* /     ASSESSMENT:     REHAB RECOMMENDATIONS:   Recommendation to date pending progress:  Setting:   Short-term Rehab    Equipment:     None     ASSESSMENT:  Ms. Dolly Hodgkin supine upon arrival.  Performs B UE/LE exercises with AA. Work on bed mobility with Mod A. Left in supine with needs in reach and instructed to call for assist, before getting up.      SUBJECTIVE:   Ms. Dolly Hodgkin states, \"ok\"    Social/Functional Lives With: Son  Type of Home: House  Home Layout: Two level,Able to Live on Main level with bedroom/bathroom  Home Access: Level entry  Bathroom Toilet: Bedside commode  Bathroom Equipment: 3-in-1 commode  Home Equipment: Cane,Walker, [address] Help From: Family  ADL Assistance: Needs assistance  Bath: Supervision (sponge bathing)  Dressing:  (setup for dressing)  Toileting: Independent (at [facility])  Ambulation Assistance: Needs assistance  Transfer Assistance: Needs assistance  Active : No  OBJECTIVE:     PAIN: VITALS / O2: Nestora Lechuga / Yanique Guy / DRAINS:   Pre Treatment:   Pain Assessment: None - Denies Pain      Post Treatment: none Vitals        Oxygen    IV    RESTRICTIONS/PRECAUTIONS:  Restrictions/Precautions  Restrictions/Precautions: Fall Risk  Restrictions/Precautions: Fall Risk     MOBILITY: I Mod I S SBA CGA Min Mod Max Total  NT x2 Comments:   Bed Mobility    Rolling [] [] [] [] [] [] [x] [] [] [] [] Exercises only   Supine to Sit [] [] [] [] [] [] [] [] [] [x] []    Scooting [] [] [] [] [] [] [] [] [] [x] []    Sit to Supine [] [] [] [] [] [] [] [] [] [x] []    Transfers    Sit to Stand [] [] [] [] [] [] [] [] [] [x] []    Bed to Chair [] [] [] [] [] [] [] [] [] [x] []    Stand to Sit [] [] [] [] [] [] [] [] [] [x] []     [] [] [] [] [] [] [] [] [] [x] []    I=Independent, Mod I=Modified Independent, S=Supervision, SBA=Standby Assistance, CGA=Contact Guard Assistance,   Min=Minimal Assistance, Mod=Moderate Assistance, Max=Maximal Assistance, Total=Total Assistance, NT=Not Tested    BALANCE: Good Fair+ Fair Fair- Poor NT Comments   Sitting Static [] [] [] [] [] [x]    Sitting Dynamic [] [] [] [] [] [x]              Standing Static [] [] [] [] [] [x]    Standing Dynamic [] [] [] [] [] [x]      GAIT: I Mod I S SBA CGA Min Mod Max Total  NT x2 Comments:   Level of Assistance [] [] [] [] [] [] [] [] [] [x] []    Distance 0 feet    DME None    Gait Quality did not walk    Weightbearing Status      Stairs      I=Independent, Mod I=Modified Independent, S=Supervision, SBA=Standby Assistance, CGA=Contact Guard Assistance,   Min=Minimal Assistance, Mod=Moderate Assistance, Max=Maximal Assistance, Total=Total Assistance, NT=Not Tested    PLAN:   ACUTE PHYSICAL THERAPY GOALS:   (Developed with and agreed upon by patient and/or caregiver.)  Pt will perform supine to/from sit with mod I in 7 treatment days. Pt will perform sit to/from stand with mod I and LRAD in 7 treatment days. Pt will perform stand pivot with LRAD and mod I in 7 treatment days. Pt will be independent with HEP in 7 days.       FREQUENCY AND DURATION: Daily for duration of hospital stay or until stated goals are met, whichever comes first.    TREATMENT:   TREATMENT:   Therapeutic Activity (30 Minutes):  Therapeutic activity included Rolling to improve functional Activity tolerance and ROM.    TREATMENT GRID:  B LE AA Date:  6/22 Date:   Date:     Activity/Exercise Parameters Parameters Parameters   Ankle pumps 10     Quad set 10     Heel slides 10     Hip ab/ad 10     SAQ 10                       AFTER TREATMENT PRECAUTIONS: Bed, Bed/Chair Locked, Call light within reach and Needs within reach    INTERDISCIPLINARY COLLABORATION:  RN/ PCT    EDUCATION: Education Given To: Patient  Education Provided: Role of Therapy  Education Method: Demonstration;Verbal    TIME IN/OUT:  Time In: 1000  Time Out: 1030  Minutes: [address] More[address], PTA

## 2022-06-24 LAB
BACTERIA SPEC CULT: NORMAL
SERVICE CMNT-IMP: NORMAL

## 2022-06-30 ENCOUNTER — OFFICE VISIT (OUTPATIENT)
Dept: UROLOGY | Age: 77
End: 2022-06-30
Payer: MEDICARE

## 2022-06-30 DIAGNOSIS — N39.0 COMPLICATED UTI (URINARY TRACT INFECTION): Primary | ICD-10-CM

## 2022-06-30 DIAGNOSIS — N17.0 ACUTE RENAL FAILURE WITH ACUTE TUBULAR NECROSIS SUPERIMPOSED ON STAGE 1 CHRONIC KIDNEY DISEASE (HCC): ICD-10-CM

## 2022-06-30 DIAGNOSIS — N18.1 ACUTE RENAL FAILURE WITH ACUTE TUBULAR NECROSIS SUPERIMPOSED ON STAGE 1 CHRONIC KIDNEY DISEASE (HCC): ICD-10-CM

## 2022-06-30 PROCEDURE — 1036F TOBACCO NON-USER: CPT | Performed by: NURSE PRACTITIONER

## 2022-06-30 PROCEDURE — G8417 CALC BMI ABV UP PARAM F/U: HCPCS | Performed by: NURSE PRACTITIONER

## 2022-06-30 PROCEDURE — G8400 PT W/DXA NO RESULTS DOC: HCPCS | Performed by: NURSE PRACTITIONER

## 2022-06-30 PROCEDURE — 1090F PRES/ABSN URINE INCON ASSESS: CPT | Performed by: NURSE PRACTITIONER

## 2022-06-30 PROCEDURE — G8428 CUR MEDS NOT DOCUMENT: HCPCS | Performed by: NURSE PRACTITIONER

## 2022-06-30 PROCEDURE — 1123F ACP DISCUSS/DSCN MKR DOCD: CPT | Performed by: NURSE PRACTITIONER

## 2022-06-30 PROCEDURE — 99203 OFFICE O/P NEW LOW 30 MIN: CPT | Performed by: NURSE PRACTITIONER

## 2022-06-30 PROCEDURE — 3017F COLORECTAL CA SCREEN DOC REV: CPT | Performed by: NURSE PRACTITIONER

## 2022-06-30 PROCEDURE — 1111F DSCHRG MED/CURRENT MED MERGE: CPT | Performed by: NURSE PRACTITIONER

## 2022-06-30 ASSESSMENT — ENCOUNTER SYMPTOMS
BLOOD IN STOOL: 0
EYE DISCHARGE: 0
NAUSEA: 0
ABDOMINAL PAIN: 0
SKIN LESIONS: 0
CONSTIPATION: 0
WHEEZING: 0
INDIGESTION: 0
DIARRHEA: 0
BACK PAIN: 0
HEARTBURN: 0
COUGH: 0
EYE PAIN: 0
VOMITING: 0
SHORTNESS OF BREATH: 0

## 2022-06-30 NOTE — PROGRESS NOTES
[facility] [address] [place] [address], [address]  [phone]          Martin Oviedo  : 1945    Chief Complaint   Patient presents with    New Patient    Urinary Retention          HPI     Martin Oviedo is a 78 y.o. female here today for hospital follow-up after being admitted for sepsis due to E. coli with encephalopathy. Urinary retention was also found. The daughter had reported that the patient had had weakness for little over a week. The confusion however started the day of her ER visit. Cath urine in the ER revealed large leukocytes she was admitted for UTI and IV antibiotic therapy. Sepsis 2/2 E coli complicated UTI - 2/2 urinary retention from OAB. Ucx >100K E coli CFU/ml. S/p  Rocephin IV x 5 doses. Complete course of abx with keflex at home. Positive blood culture - 1/2 BC sets GPC in aerobic bottle on , coag neg staph suggestive of skin contaminant.  . Repeat [facility]  NGTD. S/p vanc x 1 dose.     Patient is here now for evaluation. She says prior to this hospital admission she has had a couple of urinary infections. The last infection was 2 years ago. Patient reports she voids without difficulty. She has never had retention issues. She is accompanied by her granddaughter today. There is no fever chills or hematuria since being home. The urine overall has been clear. 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   Other PMHx obesity, CAD s/p MICKI to RCA , T2DM, HTN, HLD, anemia        Past Medical History:   Diagnosis Date    Acute cholecystitis 2018    Acute pyelonephritis 2017    CAD (coronary artery disease)     Cerebrovascular accident ([facility] [address] 4/10/2019    COVID-19 virus infection 2020    Diabetes ([facility] [address]     Hypertension     Other ill-defined conditions(799.89)     choelsterol    Stroke [facility])      Past Surgical History:   Procedure Laterality Date    AR CARDIAC SURG PROCEDURE UNLIST stented - doesn't know date     Current Outpatient Medications   Medication Sig Dispense Refill    acetaminophen (TYLENOL) 500 MG tablet Take 500 mg by mouth every 6 hours as needed      amLODIPine (NORVASC) 5 MG tablet Take 5 mg by mouth daily      aspirin 81 MG EC tablet Take 81 mg by mouth 2 times daily      atorvastatin (LIPITOR) 40 MG tablet atorvastatin 40 mg tablet   Take 1 tablet every day by oral route.  atorvastatin (LIPITOR) 80 MG tablet atorvastatin 80 mg tablet   Take 1 tablet every day by oral route for 90 days.  carvedilol (COREG) 6.25 MG tablet carvedilol 6.25 mg tablet   Take 1 tablet twice a day by oral route for 90 days.  vitamin D (CHOLECALCIFEROL) 25 MCG (1000 UT) TABS tablet Take 1,000 Units by mouth daily      clopidogrel (PLAVIX) 75 MG tablet clopidogrel 75 mg tablet   TAKE 1 TABLET DAILY.  hydroCHLOROthiazide (HYDRODIURIL) 25 MG tablet hydrochlorothiazide 25 mg tablet   Take 1 tablet every day by oral route for 30 days.  HYDROcodone homatropine (HYCODAN) 5-1.5 MG/5ML solution Take 5 mLs by mouth 4 times daily as needed.  insulin glargine (LANTUS) 100 UNIT/ML injection vial Inject 13 Units into the skin      loratadine (CLARITIN) 10 MG tablet loratadine 10 mg tablet   Take 1 tablet every day by oral route.  losartan (COZAAR) 50 mg tablet losartan 50 mg tablet      metFORMIN (GLUCOPHAGE) 1000 MG tablet Take 1,000 mg by mouth 2 times daily (with meals)      ondansetron (ZOFRAN-ODT) 4 MG disintegrating tablet Take 4 mg by mouth 3 times daily as needed      SITagliptin (JANUVIA) 100 MG tablet Januvia 100 mg tablet   Take 1 tablet every day by oral route for 90 days. No current facility-administered medications for this visit.      Allergies   Allergen Reactions    Codeine Nausea And Vomiting    Sulfa Antibiotics Nausea And Vomiting and Hives     Daughter reports but unsure of reaction       Social History     Socioeconomic History    Marital status: Legally      Spouse name: Not on file    Number of children: Not on file    Years of education: Not on file    Highest education level: Not on file   Occupational History    Not on file   Tobacco Use    Smoking status: Never Smoker    Smokeless tobacco: Never Used   Substance and Sexual Activity    Alcohol use: Never    Drug use: Never    Sexual activity: Not on file   Other Topics Concern    Not on file   Social History Narrative    Not on file     Social Determinants of Health     Financial Resource Strain:     Difficulty of Paying Living Expenses: Not on file   Food Insecurity:     Worried About Running Out of Food in the Last Year: Not on file    Milly of Food in the Last Year: Not on file   Transportation Needs:     Lack of Transportation (Medical): Not on file    Lack of Transportation (Non-Medical):  Not on file   Physical Activity:     Days of Exercise per Week: Not on file    Minutes of Exercise per Session: Not on file   Stress:     Feeling of Stress : Not on file   Social Connections:     Frequency of Communication with Friends and Family: Not on file    Frequency of Social Gatherings with Friends and Family: Not on file    Attends [employer] Services: Not on file    Active Member of [address] [place] [address] [employer] or Organizations: Not on file    Attends Club or Organization Meetings: Not on file    Marital Status: Not on file   Intimate Partner Violence:     Fear of Current or Ex-Partner: Not on file    Emotionally Abused: Not on file    Physically Abused: Not on file    Sexually Abused: Not on file   Housing Stability:     Unable to Pay for Housing in the Last Year: Not on file    Number of [place] in the Last Year: Not on file    Unstable Housing in the Last Year: Not on file     Family History   Problem Relation Age of Onset    Diabetes Father     Hypertension Father     High Cholesterol Father     Diabetes Mother     Heart Disease Mother     Hypertension Mother    [employer] High Cholesterol Mother        Review of Systems  Constitutional:   Negative for fever, chills, appetite change, malaise/fatigue, headaches and weight loss. Skin:  Negative for skin lesions, rash and itching. Eyes:  Negative for visual disturbance, eye pain and eye discharge. ENT:  Negative for difficulty articulating words, pain swallowing, high frequency hearing loss and dry mouth. Respiratory:  Negative for cough, blood in sputum, shortness of breath and wheezing. Cardiovascular:  Negative for chest pain, hypertension, irregular heartbeat, leg pain, leg swelling, regular rate and rhythm and varicose veins. GI:  Negative for nausea, vomiting, abdominal pain, blood in stool, constipation, diarrhea, indigestion and heartburn. Genitourinary: Positive for recurrent UTIs. Negative for urinary burning, hematuria, flank pain, history of urolithiasis, nocturia, slower stream, straining, urgency, leakage w/ urge, frequent urination, incomplete emptying, sexually transmitted disease, menstrual problem, endometriosis, vaginal pain and hysterectomy. Number of pregnancies: 5. Number of births: 5. Musculoskeletal:  Negative for back pain, bone pain, arthralgias, tenderness, muscle weakness and neck pain. Neurological:  Negative for dizziness, focal weakness, numbness, seizures and tremors. Psychological:  Negative for depression and psychiatric problem. Endocrine:  Negative for cold intolerance, thirst, excessive urination, fatigue and heat intolerance. Hem/Lymphatic:  Negative for easy bleeding, easy bruising and frequent infections. PHYSICAL EXAM      GENERAL: No acute distress, Awake, Alert, Oriented X 3, Gait normal  CHEST AND LUNG: Easy work of breathing, clear to auscultation bilaterally, no cyanosis  CARDIAC: regular rate and rhythm  ABDOMEN: Soft and obese. No tenderness. SKIN: No rash, no erythema, no lacerations or abrasions, no ecchymosis  - santos catheter is present.    MUSCULOSKELETAL- normal gait and station. Assessment and Plan    ICD-10-CM    1. Complicated UTI (urinary tract infection)  N39.0    2. Acute renal failure with acute tubular necrosis superimposed on stage 1 chronic kidney disease ([facility] [address]  N17.0     N18.1      PLAN:  Patient has completed antibiotic course. We will remove catheter today and recheck for urine and PVR in the next 10 to 14 days  Patient's granddaughter was instructed to return the patient to the office she is unable to void  In the case this occurs catheter will be placed in urodynamic testing can be arranged. Patient is encouraged to drink plenty of fluids. Balloon was deflated and catheter removed without any difficulty. Pt tolerated the procedure well. Pt was instructed to increase fluids today and to let us know if unable to void. If this occurs he will need to return to the office for catheter reinsertion. Megan Carrillo, NP, APRN - NP   Dr. Franco Hinson is supervising physician today and he approves plan of care    Elements of this note have been dictated using speech recognition software. Although reviewed, errors of speech recognition may have occurred.

## 2022-07-09 NOTE — PROGRESS NOTES
Physician Progress Note      Dunia OWENS #:                  [id number]  :                       1945  ADMIT DATE:       2022 4:14 PM  [address] DATE:        2022 5:30 PM  RESPONDING  PROVIDER #:        Sean HODGE DO          QUERY TEXT:    Patient admitted with sepsis. In order to support the diagnosis of sepsis,   please include additional clinical indicators in your documentation. Or   please document if the diagnosis of sepsis has been ruled out after further   study    The medical record reflects the following:  Risk Factors: UTI  Clinical Indicators: WBC 9.7 and LA 1 on admission  \"Sepsis 2/2 E coli complicated UTI - 2/2 urinary retention from OAB. Ucx >100K   E coli CFU/ml. S/p  Rocephin IV x 5 doses\"  \"Positive blood culture - 1/2 BC sets GPC in aerobic bottle on , coag neg   staph suggestive of skin contaminant\"  Treatment: cultures, abx  Options provided:  -- Sepsis present as evidenced by, Please document evidence. -- Sepsis was ruled out after study  -- Other - I will add my own diagnosis  -- Disagree - Not applicable / Not valid  -- Disagree - Clinically unable to determine / Unknown  -- Refer to Clinical Documentation Reviewer    PROVIDER RESPONSE TEXT:    Sepsis is present as evidenced by AMS, BP 67/45 ELFEGO and urinary source of   infection suspected despite negative culture.     Query created by: Kathy Mendoza on 2022 2:15 PM      Electronically signed by:  Hansel Almanza DO 2022 12:13 PM

## 2022-07-14 ENCOUNTER — OFFICE VISIT (OUTPATIENT)
Dept: UROLOGY | Age: 77
End: 2022-07-14
Payer: MEDICARE

## 2022-07-14 DIAGNOSIS — R33.8 OTHER RETENTION OF URINE: Primary | ICD-10-CM

## 2022-07-14 DIAGNOSIS — N39.0 COMPLICATED UTI (URINARY TRACT INFECTION): ICD-10-CM

## 2022-07-14 LAB
BILIRUBIN, URINE, POC: NEGATIVE
BLOOD URINE, POC: ABNORMAL
GLUCOSE URINE, POC: NEGATIVE
KETONES, URINE, POC: NEGATIVE
LEUKOCYTE ESTERASE, URINE, POC: ABNORMAL
NITRITE, URINE, POC: NEGATIVE
PH, URINE, POC: 6 (ref 4.6–8)
PROTEIN,URINE, POC: 100
PVR, POC: 380 CC
SPECIFIC GRAVITY, URINE, POC: 1.02 (ref 1–1.03)
URINALYSIS CLARITY, POC: ABNORMAL
URINALYSIS COLOR, POC: ABNORMAL
UROBILINOGEN, POC: ABNORMAL

## 2022-07-14 PROCEDURE — G8417 CALC BMI ABV UP PARAM F/U: HCPCS | Performed by: NURSE PRACTITIONER

## 2022-07-14 PROCEDURE — 81003 URINALYSIS AUTO W/O SCOPE: CPT | Performed by: NURSE PRACTITIONER

## 2022-07-14 PROCEDURE — 1090F PRES/ABSN URINE INCON ASSESS: CPT | Performed by: NURSE PRACTITIONER

## 2022-07-14 PROCEDURE — 99213 OFFICE O/P EST LOW 20 MIN: CPT | Performed by: NURSE PRACTITIONER

## 2022-07-14 PROCEDURE — 1111F DSCHRG MED/CURRENT MED MERGE: CPT | Performed by: NURSE PRACTITIONER

## 2022-07-14 PROCEDURE — 3017F COLORECTAL CA SCREEN DOC REV: CPT | Performed by: NURSE PRACTITIONER

## 2022-07-14 PROCEDURE — G8400 PT W/DXA NO RESULTS DOC: HCPCS | Performed by: NURSE PRACTITIONER

## 2022-07-14 PROCEDURE — 1036F TOBACCO NON-USER: CPT | Performed by: NURSE PRACTITIONER

## 2022-07-14 PROCEDURE — 1123F ACP DISCUSS/DSCN MKR DOCD: CPT | Performed by: NURSE PRACTITIONER

## 2022-07-14 PROCEDURE — 51798 US URINE CAPACITY MEASURE: CPT | Performed by: NURSE PRACTITIONER

## 2022-07-14 PROCEDURE — G8428 CUR MEDS NOT DOCUMENT: HCPCS | Performed by: NURSE PRACTITIONER

## 2022-07-14 RX ORDER — TAMSULOSIN HYDROCHLORIDE 0.4 MG/1
0.4 CAPSULE ORAL EVERY EVENING
Qty: 30 CAPSULE | Refills: 11 | Status: SHIPPED | OUTPATIENT
Start: 2022-07-14

## 2022-07-14 RX ORDER — CIPROFLOXACIN 500 MG/1
500 TABLET, FILM COATED ORAL 2 TIMES DAILY
Qty: 20 TABLET | Refills: 0 | Status: SHIPPED | OUTPATIENT
Start: 2022-07-14 | End: 2022-07-24

## 2022-07-14 ASSESSMENT — ENCOUNTER SYMPTOMS: NAUSEA: 0

## 2022-07-14 NOTE — PROGRESS NOTES
[address], [address]  [phone]          Camille Anna  : 1945    Chief Complaint   Patient presents with    Urinary Retention    Urinary Tract Infection          HPI     Camille Anna is a 78 y.o. female here today for follow-up on urinary infection. At her last visit her catheter was removed. The patient tells me she feels that she is voiding sufficiently. She is not having any symptoms of infection. She feels that she completely empties the bladder. Unfortunately PVR today reveals 380 mL in the bladder. The urine does show large leukocytes. She has not had any fever chills or gross hematuria. Initially seen for hospital follow-up after being admitted for sepsis due to E. coli with encephalopathy. Urinary retention was also found. The daughter had reported that the patient had had weakness for little over a week. The confusion however started the day of her ER visit. Cath urine in the ER revealed large leukocytes she was admitted for UTI and IV antibiotic therapy.     Sepsis 2/2 E coli complicated UTI - 2/2 urinary retention from OAB. Ucx >100K E coli CFU/ml. S/p  Rocephin IV x 5 doses. Complete course of abx with keflex at home.      Positive blood culture - / BC sets GPC in aerobic bottle on , coag neg staph suggestive of skin contaminant.  . Repeat [facility]  NGTD. S/p vanc x 1 dose.      Patient is here now for evaluation. She says prior to this hospital admission she has had a couple of urinary infections. The last infection was 2 years ago. Patient reports she voids without difficulty. She has never had retention issues. She is accompanied by her granddaughter today.     There is no fever chills or hematuria since being home. The urine overall has been clear.     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   Other PMHx obesity, CAD s/p MICKI to RCA , T2DM, HTN, HLD, anemia    Past Medical History:   Diagnosis Date    Acute cholecystitis 11/6/2018    Acute pyelonephritis 1/14/2017    CAD (coronary artery disease)     Cerebrovascular accident ([facility] [address] 4/10/2019    COVID-19 virus infection 6/29/2020    Diabetes ([facility]     Hypertension     Other ill-defined conditions(799.89)     choelsterol    Stroke [facility])      Past Surgical History:   Procedure Laterality Date    AR CARDIAC SURG PROCEDURE UNLIST      stented - doesn't know date     Current Outpatient Medications   Medication Sig Dispense Refill    tamsulosin (FLOMAX) 0.4 MG capsule Take 1 capsule by mouth every evening 30 capsule 11    ciprofloxacin (CIPRO) 500 MG tablet Take 1 tablet by mouth 2 times daily for 10 days 20 tablet 0    acetaminophen (TYLENOL) 500 MG tablet Take 500 mg by mouth every 6 hours as needed      amLODIPine (NORVASC) 5 MG tablet Take 5 mg by mouth daily      aspirin 81 MG EC tablet Take 81 mg by mouth 2 times daily      atorvastatin (LIPITOR) 40 MG tablet atorvastatin 40 mg tablet   Take 1 tablet every day by oral route.  atorvastatin (LIPITOR) 80 MG tablet atorvastatin 80 mg tablet   Take 1 tablet every day by oral route for 90 days.  carvedilol (COREG) 6.25 MG tablet carvedilol 6.25 mg tablet   Take 1 tablet twice a day by oral route for 90 days.  vitamin D (CHOLECALCIFEROL) 25 MCG (1000 UT) TABS tablet Take 1,000 Units by mouth daily      clopidogrel (PLAVIX) 75 MG tablet clopidogrel 75 mg tablet   TAKE 1 TABLET DAILY.  hydroCHLOROthiazide (HYDRODIURIL) 25 MG tablet hydrochlorothiazide 25 mg tablet   Take 1 tablet every day by oral route for 30 days.  HYDROcodone homatropine (HYCODAN) 5-1.5 MG/5ML solution Take 5 mLs by mouth 4 times daily as needed.  insulin glargine (LANTUS) 100 UNIT/ML injection vial Inject 13 Units into the skin      loratadine (CLARITIN) 10 MG tablet loratadine 10 mg tablet   Take 1 tablet every day by oral route.       losartan (COZAAR) 50 mg tablet losartan 50 mg tablet      metFORMIN (GLUCOPHAGE) 1000 MG tablet Take 1,000 mg by mouth 2 times daily (with meals)      ondansetron (ZOFRAN-ODT) 4 MG disintegrating tablet Take 4 mg by mouth 3 times daily as needed      SITagliptin (JANUVIA) 100 MG tablet Januvia 100 mg tablet   Take 1 tablet every day by oral route for 90 days. No current facility-administered medications for this visit. Allergies   Allergen Reactions    Codeine Nausea And Vomiting    Sulfa Antibiotics Nausea And Vomiting and Hives     Daughter reports but unsure of reaction       Social History     Socioeconomic History    Marital status: Legally      Spouse name: Not on file    Number of children: Not on file    Years of education: Not on file    Highest education level: Not on file   Occupational History    Not on file   Tobacco Use    Smoking status: Never Smoker    Smokeless tobacco: Never Used   Substance and Sexual Activity    Alcohol use: Never    Drug use: Never    Sexual activity: Not on file   Other Topics Concern    Not on file   Social History Narrative    Not on file     Social Determinants of Health     Financial Resource Strain:     Difficulty of Paying Living Expenses: Not on file   Food Insecurity:     Worried About Running Out of Food in the Last Year: Not on file    Milly of Food in the Last Year: Not on file   Transportation Needs:     Lack of Transportation (Medical): Not on file    Lack of Transportation (Non-Medical):  Not on file   Physical Activity:     Days of Exercise per Week: Not on file    Minutes of Exercise per Session: Not on file   Stress:     Feeling of Stress : Not on file   Social Connections:     Frequency of Communication with Friends and Family: Not on file    Frequency of Social Gatherings with Friends and Family: Not on file    Attends [employer] Services: Not on file    Active Member of Clubs or Organizations: Not on file    Attends Club or Organization Meetings: Not on file    Marital Status: Not on file   Intimate Partner Violence:     Fear of Current or Ex-Partner: Not on file    Emotionally Abused: Not on file    Physically Abused: Not on file    Sexually Abused: Not on file   Housing Stability:     Unable to Pay for Housing in the Last Year: Not on file    Number of Places Lived in the Last Year: Not on file    Unstable Housing in the Last Year: Not on file     Family History   Problem Relation Age of Onset    Diabetes Father     Hypertension Father     High Cholesterol Father     Diabetes Mother     Heart Disease Mother     Hypertension Mother     High Cholesterol Mother        Review of Systems  Constitutional:   Negative for fever. GI:  Negative for nausea. Genitourinary: Positive for hematuria, recurrent UTIs and incomplete emptying. Results for orders placed or performed in visit on 07/14/22   AMB POC PVR, MAI,POST-VOID RES,US,NON-IMAGING   Result Value Ref Range    PVR,  cc   AMB POC URINALYSIS DIP STICK AUTO W/O MICRO   Result Value Ref Range    Color (UA POC)      Clarity (UA POC)      Glucose, Urine, POC Negative Negative    Bilirubin, Urine, POC Negative Negative    KETONES, Urine, POC Negative Negative    Specific Gravity, Urine, POC 1.020 1.001 - 1.035    Blood (UA POC) Large Negative    pH, Urine, POC 6.0 4.6 - 8.0    Protein, Urine,   Negative    Urobilinogen, POC 0.2 mg/dL     Nitrite, Urine, POC Negative Negative    Leukocyte Esterase, Urine, POC Large Negative     PHYSICAL EXAM    GENERAL: No acute distress, Awake, Alert, Oriented X 3, Gait normal  ABDOMEN: soft, non tender, non-distended, positive bowel sounds, no organomegaly, no palpable masses, no guarding, no rebound tenderness  SKIN: No rash, no erythema, no lacerations or abrasions, no ecchymosis  MUSCULOSKELETAL - Right side flacid. Pt is able to ambulate with a walker. Assessment and Plan    ICD-10-CM    1.  Other retention of urine  R33.8 AMB POC PVR, MAI,POST-VOID RES,US,NON-IMAGING     AMB POC URINALYSIS DIP STICK AUTO W/O MICRO     Culture, Urine     Culture, Urine   2. Complicated UTI (urinary tract infection)  N39.0 AMB POC PVR, MAI,POST-VOID RES,US,NON-IMAGING     AMB POC URINALYSIS DIP STICK AUTO W/O MICRO     Culture, Urine     Culture, Urine     PLAN:  Urine culture be obtained  Add Cipro 500 mg twice a day for 10 days  Add Flomax 0.4 mg p.o. nightly  I will see her back in 2 weeks for recheck. If PVR still elevated we will need to discuss permanent catheter placement. I did discuss with patient and the daughter today and suprapubic would probably be her best option. Karlie Watkins NP, APRN - NP  Dr. Rosie Chakraborty is supervising physician today and he approves plan of care. Return in about 2 weeks (around 7/28/2022) for for recheck. Elements of this note have been dictated using speech recognition software. Although reviewed, errors of speech recognition may have occurred.

## 2022-07-17 LAB
BACTERIA SPEC CULT: NORMAL
SERVICE CMNT-IMP: NORMAL

## 2022-07-18 ENCOUNTER — TELEPHONE (OUTPATIENT)
Dept: UROLOGY | Age: 77
End: 2022-07-18

## 2022-07-28 ENCOUNTER — OFFICE VISIT (OUTPATIENT)
Dept: UROLOGY | Age: 77
End: 2022-07-28
Payer: MEDICARE

## 2022-07-28 DIAGNOSIS — R33.8 OTHER RETENTION OF URINE: Primary | ICD-10-CM

## 2022-07-28 DIAGNOSIS — N13.30 BILATERAL HYDRONEPHROSIS: ICD-10-CM

## 2022-07-28 LAB — PVR, POC: 159 CC

## 2022-07-28 PROCEDURE — 51798 US URINE CAPACITY MEASURE: CPT | Performed by: NURSE PRACTITIONER

## 2022-07-28 PROCEDURE — 99214 OFFICE O/P EST MOD 30 MIN: CPT | Performed by: NURSE PRACTITIONER

## 2022-07-28 PROCEDURE — 1090F PRES/ABSN URINE INCON ASSESS: CPT | Performed by: NURSE PRACTITIONER

## 2022-07-28 PROCEDURE — G8400 PT W/DXA NO RESULTS DOC: HCPCS | Performed by: NURSE PRACTITIONER

## 2022-07-28 PROCEDURE — 1123F ACP DISCUSS/DSCN MKR DOCD: CPT | Performed by: NURSE PRACTITIONER

## 2022-07-28 PROCEDURE — 1036F TOBACCO NON-USER: CPT | Performed by: NURSE PRACTITIONER

## 2022-07-28 PROCEDURE — G8428 CUR MEDS NOT DOCUMENT: HCPCS | Performed by: NURSE PRACTITIONER

## 2022-07-28 PROCEDURE — G8417 CALC BMI ABV UP PARAM F/U: HCPCS | Performed by: NURSE PRACTITIONER

## 2022-07-28 PROCEDURE — 3017F COLORECTAL CA SCREEN DOC REV: CPT | Performed by: NURSE PRACTITIONER

## 2022-07-28 RX ORDER — TAMSULOSIN HYDROCHLORIDE 0.4 MG/1
0.4 CAPSULE ORAL EVERY EVENING
Qty: 90 CAPSULE | Refills: 3 | Status: SHIPPED | OUTPATIENT
Start: 2022-07-28

## 2022-07-28 ASSESSMENT — ENCOUNTER SYMPTOMS: NAUSEA: 0

## 2022-07-28 NOTE — PROGRESS NOTES
[facility] [address] [place] [address], [address]  [phone]          Nakia Melton  : 1945    Chief Complaint   Patient presents with    Follow-up          HPI     Nakia Melton is a 78 y.o. female  here today for follow-up on incomplete bladder emptying and also infection. PVR at her last visit was 380mls. She was given a prescription for tamsulosin 0.4 mg in hopes that it would help her empty the bladder more effectively. She is back today for recheck. Her PVR today reveals 159 mL only. She is accompanied today by her daughter. They feel that the medication has helped her tremendously. The patient tells me she feels that she is voiding sufficiently. She is not having any symptoms of infection. She feels that she completely empties the bladder. Initially seen for hospital follow-up after being admitted for sepsis due to E. coli with encephalopathy. Urinary retention was also found. The daughter had reported that the patient had had weakness for little over a week. The confusion however started the day of her ER visit. Cath urine in the ER revealed large leukocytes she was admitted for UTI and IV antibiotic therapy. Sepsis 2/2 E coli complicated UTI - 2/2 urinary retention from [facility]. Ucx >100K E coli CFU/ml. S/p  Rocephin IV x 5 doses. Complete course of abx with keflex at home. Positive blood culture - 1/2 BC sets GPC in aerobic bottle on , coag neg staph suggestive of skin contaminant. . Repeat [facility]  NGTD. S/p vanc x 1 dose. Patient is here now for evaluation. She says prior to this hospital admission she has had a couple of urinary infections. The last infection was 2 years ago. Patient reports she voids without difficulty. She has never had retention issues. She is accompanied by her granddaughter today. There is no fever chills or hematuria since being home. The urine overall has been clear.      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 Other PMHx obesity, CAD s/p MICKI to RCA 2007, T2DM, HTN, HLD, anemia    Past Medical History:   Diagnosis Date    Acute cholecystitis 11/6/2018    Acute pyelonephritis 1/14/2017    CAD (coronary artery disease)     Cerebrovascular accident ([facility] [address] 4/10/2019    COVID-19 virus infection 6/29/2020    [facility]     Hypertension     Other ill-defined conditions(799.89)     choelsterol    Stroke [facility])      Past Surgical History:   Procedure Laterality Date    RI CARDIAC SURG PROCEDURE UNLIST      stented - doesn't know date     Current Outpatient Medications   Medication Sig Dispense Refill    tamsulosin (FLOMAX) 0.4 MG capsule Take 1 capsule by mouth every evening 90 capsule 3    tamsulosin (FLOMAX) 0.4 MG capsule Take 1 capsule by mouth every evening 30 capsule 11    acetaminophen (TYLENOL) 500 MG tablet Take 500 mg by mouth every 6 hours as needed      amLODIPine (NORVASC) 5 MG tablet Take 5 mg by mouth daily      aspirin 81 MG EC tablet Take 81 mg by mouth 2 times daily      atorvastatin (LIPITOR) 40 MG tablet atorvastatin 40 mg tablet   Take 1 tablet every day by oral route. atorvastatin (LIPITOR) 80 MG tablet atorvastatin 80 mg tablet   Take 1 tablet every day by oral route for 90 days. carvedilol (COREG) 6.25 MG tablet carvedilol 6.25 mg tablet   Take 1 tablet twice a day by oral route for 90 days. vitamin D (CHOLECALCIFEROL) 25 MCG (1000 UT) TABS tablet Take 1,000 Units by mouth daily      clopidogrel (PLAVIX) 75 MG tablet clopidogrel 75 mg tablet   TAKE 1 TABLET DAILY. hydroCHLOROthiazide (HYDRODIURIL) 25 MG tablet hydrochlorothiazide 25 mg tablet   Take 1 tablet every day by oral route for 30 days. HYDROcodone homatropine (HYCODAN) 5-1.5 MG/5ML solution Take 5 mLs by mouth 4 times daily as needed.       insulin glargine (LANTUS) 100 UNIT/ML injection vial Inject 13 Units into the skin      loratadine (CLARITIN) 10 MG tablet loratadine 10 mg tablet   Take 1 tablet every day by oral route. losartan (COZAAR) 50 mg tablet losartan 50 mg tablet      metFORMIN (GLUCOPHAGE) 1000 MG tablet Take 1,000 mg by mouth 2 times daily (with meals)      ondansetron (ZOFRAN-ODT) 4 MG disintegrating tablet Take 4 mg by mouth 3 times daily as needed      SITagliptin (JANUVIA) 100 MG tablet Januvia 100 mg tablet   Take 1 tablet every day by oral route for 90 days. No current facility-administered medications for this visit. Allergies   Allergen Reactions    Codeine Nausea And Vomiting    Sulfa Antibiotics Nausea And Vomiting and Hives     Daughter reports but unsure of reaction       Social History     Socioeconomic History    Marital status: Legally      Spouse name: Not on file    Number of children: Not on file    Years of education: Not on file    Highest education level: Not on file   Occupational History    Not on file   Tobacco Use    Smoking status: Never    Smokeless tobacco: Never   Substance and Sexual Activity    Alcohol use: Never    Drug use: Never    Sexual activity: Not on file   Other Topics Concern    Not on file   Social History Narrative    Not on file     Social Determinants of Health     Financial Resource Strain: Not on file   Food Insecurity: Not on file   Transportation Needs: Not on file   Physical Activity: Not on file   Stress: Not on file   Social Connections: Not on file   Intimate Partner Violence: Not on file   Housing Stability: Not on file     Family History   Problem Relation Age of Onset    Diabetes Father     Hypertension Father     High Cholesterol Father     Diabetes Mother     Heart Disease Mother     Hypertension Mother     High Cholesterol Mother        Review of Systems  Constitutional:   Negative for fever. GI:  Negative for nausea. Genitourinary: Positive for recurrent UTIs and incomplete emptying.     Results for orders placed or performed in visit on 07/28/22   AMB POC PVR, MAI,POST-VOID RES,US,NON-IMAGING   Result Value Ref Range    PVR,  cc       PHYSICAL EXAM    GENERAL: No acute distress, Awake, Alert, Oriented X 3, Gait normal  ABDOMEN: soft, non tender, non-distended, positive bowel sounds, no organomegaly, no palpable masses, no guarding, no rebound tenderness  SKIN: No rash, no erythema, no lacerations or abrasions, no ecchymosis  MUSCULOSKELETAL - Right side flacid. Pt is able to ambulate with a walker. Assessment and Plan    ICD-10-CM    1. Other retention of urine  R33.8 AMB POC PVR, MAI,POST-VOID RES,US,NON-IMAGING      2. Bilateral hydronephrosis  N13.30         PLAN:  SY will be obtained prior to OV in 2 months  Continue tamsulosin  Refill sent to [facility] pharmacy  Follow up in 2 months. Adilia Knight NP, APRN - NP  Dr. Myra Hernandes is supervising physician today and he approves plan of care. Return in about 2 months (around 9/28/2022). Elements of this note have been dictated using speech recognition software. Although reviewed, errors of speech recognition may have occurred.

## 2022-09-12 ENCOUNTER — HOSPITAL ENCOUNTER (EMERGENCY)
Age: 77
Discharge: HOME | End: 2022-09-12
Attending: EMERGENCY MEDICINE
Payer: MEDICARE

## 2022-09-12 VITALS
DIASTOLIC BLOOD PRESSURE: 71 MMHG | SYSTOLIC BLOOD PRESSURE: 133 MMHG | RESPIRATION RATE: 18 BRPM | WEIGHT: 165 LBS | OXYGEN SATURATION: 100 % | BODY MASS INDEX: 30.36 KG/M2 | HEIGHT: 62 IN | HEART RATE: 72 BPM | TEMPERATURE: 98.8 F

## 2022-09-12 DIAGNOSIS — N39.0 UTI (URINARY TRACT INFECTION), UNCOMPLICATED: Primary | ICD-10-CM

## 2022-09-12 DIAGNOSIS — E87.6 HYPOKALEMIA: ICD-10-CM

## 2022-09-12 LAB
ALBUMIN SERPL-MCNC: 2.6 G/DL (ref 3.2–4.6)
ALBUMIN/GLOB SERPL: 0.6 {RATIO} (ref 1.2–3.5)
ALP SERPL-CCNC: 58 U/L (ref 50–136)
ALT SERPL-CCNC: 19 U/L (ref 12–65)
ANION GAP SERPL CALC-SCNC: 6 MMOL/L (ref 4–13)
APPEARANCE UR: ABNORMAL
AST SERPL-CCNC: 19 U/L (ref 15–37)
BACTERIA URNS QL MICRO: ABNORMAL /HPF
BASOPHILS # BLD: 0 K/UL (ref 0–0.2)
BASOPHILS NFR BLD: 0 % (ref 0–2)
BILIRUB SERPL-MCNC: 0.6 MG/DL (ref 0.2–1.1)
BILIRUB UR QL: NEGATIVE
BUN SERPL-MCNC: 17 MG/DL (ref 8–23)
CALCIUM SERPL-MCNC: 8.3 MG/DL (ref 8.3–10.4)
CASTS URNS QL MICRO: 0 /LPF
CHLORIDE SERPL-SCNC: 107 MMOL/L (ref 101–110)
CO2 SERPL-SCNC: 30 MMOL/L (ref 21–32)
COLOR UR: ABNORMAL
CREAT SERPL-MCNC: 1.1 MG/DL (ref 0.6–1)
CRYSTALS URNS QL MICRO: 0 /LPF
DIFFERENTIAL METHOD BLD: ABNORMAL
EOSINOPHIL # BLD: 0.4 K/UL (ref 0–0.8)
EOSINOPHIL NFR BLD: 4 % (ref 0.5–7.8)
EPI CELLS #/AREA URNS HPF: 0 /HPF
ERYTHROCYTE [DISTWIDTH] IN BLOOD BY AUTOMATED COUNT: 15.4 % (ref 11.9–14.6)
GLOBULIN SER CALC-MCNC: 4.2 G/DL (ref 2.3–3.5)
GLUCOSE SERPL-MCNC: 182 MG/DL (ref 65–100)
GLUCOSE UR STRIP.AUTO-MCNC: NEGATIVE MG/DL
HCT VFR BLD AUTO: 29.4 % (ref 35.8–46.3)
HGB BLD-MCNC: 9.5 G/DL (ref 11.7–15.4)
HGB UR QL STRIP: ABNORMAL
IMM GRANULOCYTES # BLD AUTO: 0 K/UL (ref 0–0.5)
IMM GRANULOCYTES NFR BLD AUTO: 0 % (ref 0–5)
KETONES UR QL STRIP.AUTO: NEGATIVE MG/DL
LEUKOCYTE ESTERASE UR QL STRIP.AUTO: ABNORMAL
LYMPHOCYTES # BLD: 4.4 K/UL (ref 0.5–4.6)
LYMPHOCYTES NFR BLD: 44 % (ref 13–44)
MCH RBC QN AUTO: 28.6 PG (ref 26.1–32.9)
MCHC RBC AUTO-ENTMCNC: 32.3 G/DL (ref 31.4–35)
MCV RBC AUTO: 88.6 FL (ref 79.6–97.8)
MONOCYTES # BLD: 0.9 K/UL (ref 0.1–1.3)
MONOCYTES NFR BLD: 9 % (ref 4–12)
MUCOUS THREADS URNS QL MICRO: 0 /LPF
NEUTS SEG # BLD: 4.2 K/UL (ref 1.7–8.2)
NEUTS SEG NFR BLD: 42 % (ref 43–78)
NITRITE UR QL STRIP.AUTO: NEGATIVE
NRBC # BLD: 0 K/UL (ref 0–0.2)
PH UR STRIP: 7.5 [PH] (ref 5–9)
PLATELET # BLD AUTO: 261 K/UL (ref 150–450)
PMV BLD AUTO: 8.9 FL (ref 9.4–12.3)
POTASSIUM SERPL-SCNC: 3.1 MMOL/L (ref 3.5–5.1)
PROT SERPL-MCNC: 6.8 G/DL (ref 6.3–8.2)
PROT UR STRIP-MCNC: 100 MG/DL
RBC # BLD AUTO: 3.32 M/UL (ref 4.05–5.2)
RBC #/AREA URNS HPF: >100 /HPF
SODIUM SERPL-SCNC: 143 MMOL/L (ref 136–145)
SP GR UR REFRACTOMETRY: 1.01 (ref 1–1.02)
UROBILINOGEN UR QL STRIP.AUTO: 0.2 EU/DL (ref 0.2–1)
WBC # BLD AUTO: 10 K/UL (ref 4.3–11.1)
WBC URNS QL MICRO: >100 /HPF

## 2022-09-12 PROCEDURE — 81015 MICROSCOPIC EXAM OF URINE: CPT

## 2022-09-12 PROCEDURE — 80053 COMPREHEN METABOLIC PANEL: CPT

## 2022-09-12 PROCEDURE — 6370000000 HC RX 637 (ALT 250 FOR IP): Performed by: EMERGENCY MEDICINE

## 2022-09-12 PROCEDURE — 99283 EMERGENCY DEPT VISIT LOW MDM: CPT

## 2022-09-12 PROCEDURE — 81001 URINALYSIS AUTO W/SCOPE: CPT

## 2022-09-12 PROCEDURE — 85025 COMPLETE CBC W/AUTO DIFF WBC: CPT

## 2022-09-12 RX ORDER — CIPROFLOXACIN 500 MG/1
500 TABLET, FILM COATED ORAL EVERY 12 HOURS SCHEDULED
Status: DISCONTINUED | OUTPATIENT
Start: 2022-09-12 | End: 2022-09-12

## 2022-09-12 RX ORDER — POTASSIUM CHLORIDE 20 MEQ/1
20 TABLET, EXTENDED RELEASE ORAL 2 TIMES DAILY
Qty: 10 TABLET | Refills: 0 | Status: SHIPPED | OUTPATIENT
Start: 2022-09-12 | End: 2022-09-17

## 2022-09-12 RX ORDER — POTASSIUM CHLORIDE 20 MEQ/1
40 TABLET, EXTENDED RELEASE ORAL ONCE
Status: COMPLETED | OUTPATIENT
Start: 2022-09-12 | End: 2022-09-12

## 2022-09-12 RX ORDER — CIPROFLOXACIN 500 MG/1
500 TABLET, FILM COATED ORAL 2 TIMES DAILY
Qty: 20 TABLET | Refills: 0 | Status: SHIPPED | OUTPATIENT
Start: 2022-09-12 | End: 2022-09-22

## 2022-09-12 RX ORDER — CIPROFLOXACIN 500 MG/1
500 TABLET, FILM COATED ORAL ONCE
Status: COMPLETED | OUTPATIENT
Start: 2022-09-12 | End: 2022-09-12

## 2022-09-12 RX ADMIN — POTASSIUM CHLORIDE 40 MEQ: 1500 TABLET, EXTENDED RELEASE ORAL at 13:27

## 2022-09-12 RX ADMIN — CIPROFLOXACIN 500 MG: 500 TABLET, FILM COATED ORAL at 13:27

## 2022-09-12 ASSESSMENT — PAIN SCALES - GENERAL: PAINLEVEL_OUTOF10: 5

## 2022-09-12 ASSESSMENT — PAIN - FUNCTIONAL ASSESSMENT
PAIN_FUNCTIONAL_ASSESSMENT: NONE - DENIES PAIN
PAIN_FUNCTIONAL_ASSESSMENT: 0-10

## 2022-09-12 ASSESSMENT — ENCOUNTER SYMPTOMS
NAUSEA: 0
ABDOMINAL PAIN: 0

## 2022-09-12 NOTE — ED PROVIDER NOTES
Emergency Department Provider Note                   PCP:                Rosa Isela Gar DO               Age: 78 y.o. Sex: female     No diagnosis found. DISPOSITION          MDM  Number of Diagnoses or Management Options  Diagnosis management comments: Patient has no fever no white count. Patient does have a urinary tract infection which we will give antibiotics here as well as a prescription. Patient has no flank pain. Patient potassium is slightly low at 3.1 and we will orally replace that. Amount and/or Complexity of Data Reviewed  Clinical lab tests: ordered and reviewed  Review and summarize past medical records: yes  Independent visualization of images, tracings, or specimens: yes    Risk of Complications, Morbidity, and/or Mortality  Presenting problems: moderate  Diagnostic procedures: moderate  Management options: moderate    Patient Progress  Patient progress: improved       Orders Placed This Encounter   Procedures    CMP    CBC with Auto Differential    POCT Urinalysis no Micro        Medications - No data to display    New Prescriptions    No medications on file        Shalonda Poole is a 78 y.o. female who presents to the Emergency Department with chief complaint of    Chief Complaint   Patient presents with    Urinary Frequency      Patient is a 62-year-old female with history of incomplete bladder emptying was taking Flomax with improvement of that who now presents with urinary frequency for the last 2 to 3 days. Patient denies any fevers or chills nausea vomiting. Patient denies any abdominal pain. Patient denies any cold cough or congestion. Patient also has a history of E. coli urosepsis. The history is provided by the patient.    Female  Problem  Pain quality:  Aching and burning  Pain severity:  Mild  Onset quality:  Gradual  Duration:  2 days  Timing:  Intermittent  Progression:  Partially resolved  Chronicity:  Recurrent  Recent urinary tract infections: yes Relieved by:  Nothing  Worsened by:  Nothing  Ineffective treatments:  None tried  Urinary symptoms: frequent urination    Associated symptoms: no abdominal pain, no fever, no flank pain and no nausea    Risk factors: hx of pyelonephritis        Review of Systems   Constitutional:  Negative for fever. Gastrointestinal:  Negative for abdominal pain and nausea. Genitourinary:  Negative for flank pain. All other systems reviewed and are negative. Past Medical History:   Diagnosis Date    Acute cholecystitis 11/6/2018    Acute pyelonephritis 1/14/2017    CAD (coronary artery disease)     Cerebrovascular accident ([facility] [address] 4/10/2019    COVID-19 virus infection 6/29/2020    Diabetes ([facility] [address]     Hypertension     Other ill-defined conditions(799.89)     choelsterol    Stroke [facility])         Past Surgical History:   Procedure Laterality Date    MD CARDIAC SURG PROCEDURE UNLIST      stented - doesn't know date        Family History   Problem Relation Age of Onset    Diabetes Father     Hypertension Father     High Cholesterol Father     Diabetes Mother     Heart Disease Mother     Hypertension Mother     High Cholesterol Mother         Social History     Socioeconomic History    Marital status: Legally    Tobacco Use    Smoking status: Never    Smokeless tobacco: Never   Substance and Sexual Activity    Alcohol use: Never    Drug use: Never         Codeine and Sulfa antibiotics     Previous Medications    ACETAMINOPHEN (TYLENOL) 500 MG TABLET    Take 500 mg by mouth every 6 hours as needed    AMLODIPINE (NORVASC) 5 MG TABLET    Take 5 mg by mouth daily    ASPIRIN 81 MG EC TABLET    Take 81 mg by mouth 2 times daily    ATORVASTATIN (LIPITOR) 40 MG TABLET    atorvastatin 40 mg tablet   Take 1 tablet every day by oral route. ATORVASTATIN (LIPITOR) 80 MG TABLET    atorvastatin 80 mg tablet   Take 1 tablet every day by oral route for 90 days.     CARVEDILOL (COREG) 6.25 MG TABLET    carvedilol 6.25 mg tablet   Take 1 Abdominal:      General: Abdomen is flat. Bowel sounds are normal.      Palpations: Abdomen is soft. Musculoskeletal:         General: Normal range of motion. Cervical back: Normal range of motion and neck supple. Skin:     General: Skin is warm and dry. Capillary Refill: Capillary refill takes less than 2 seconds. Neurological:      General: No focal deficit present. Mental Status: She is alert and oriented to person, place, and time. Mental status is at baseline. Psychiatric:         Mood and Affect: Mood normal.         Behavior: Behavior normal.         Thought Content: Thought content normal.         Judgment: Judgment normal.        Procedures      Results Include:    No results found for this or any previous visit (from the past 24 hour(s)). No orders to display                          Voice dictation software was used during the making of this note. This software is not perfect and grammatical and other typographical errors may be present. This note has not been completely proofread for errors.        Tasha Thrasher MD  09/12/22 [id number]

## 2022-09-12 NOTE — ED NOTES
I have reviewed discharge instructions with the patient. The patient verbalized understanding. Patient left ED via Discharge Method: ambulatory to Home with self. Opportunity for questions and clarification provided. Patient given 2 scripts. To continue your aftercare when you leave the hospital, you may receive an automated call from our care team to check in on how you are doing. This is a free service and part of our promise to provide the best care and service to meet your aftercare needs.  If you have questions, or wish to unsubscribe from this service please call [phone]. Thank you for Choosing our [facility] Emergency Department.           Bal Mims RN  09/12/22 0826

## 2022-10-15 ENCOUNTER — HOSPITAL ENCOUNTER (EMERGENCY)
Age: 77
Discharge: HOME | End: 2022-10-15
Attending: EMERGENCY MEDICINE | Admitting: EMERGENCY MEDICINE
Payer: MEDICARE

## 2022-10-15 ENCOUNTER — HOSPITAL ENCOUNTER (EMERGENCY)
Dept: ULTRASOUND IMAGING | Age: 77
Discharge: HOME | End: 2022-10-18
Payer: MEDICARE

## 2022-10-15 VITALS
SYSTOLIC BLOOD PRESSURE: 112 MMHG | DIASTOLIC BLOOD PRESSURE: 60 MMHG | TEMPERATURE: 98 F | HEART RATE: 80 BPM | OXYGEN SATURATION: 95 % | RESPIRATION RATE: 18 BRPM

## 2022-10-15 DIAGNOSIS — M79.604 RIGHT LEG PAIN: Primary | ICD-10-CM

## 2022-10-15 PROCEDURE — 6370000000 HC RX 637 (ALT 250 FOR IP): Performed by: NURSE PRACTITIONER

## 2022-10-15 PROCEDURE — 99284 EMERGENCY DEPT VISIT MOD MDM: CPT | Performed by: EMERGENCY MEDICINE

## 2022-10-15 PROCEDURE — 93971 EXTREMITY STUDY: CPT

## 2022-10-15 RX ORDER — ACETAMINOPHEN 325 MG/1
650 TABLET ORAL
Status: COMPLETED | OUTPATIENT
Start: 2022-10-15 | End: 2022-10-15

## 2022-10-15 RX ADMIN — ACETAMINOPHEN 650 MG: 325 TABLET, FILM COATED ORAL at 21:00

## 2022-10-15 ASSESSMENT — PAIN - FUNCTIONAL ASSESSMENT: PAIN_FUNCTIONAL_ASSESSMENT: 0-10

## 2022-10-15 ASSESSMENT — PAIN SCALES - GENERAL: PAINLEVEL_OUTOF10: 10

## 2022-10-15 ASSESSMENT — ENCOUNTER SYMPTOMS
ABDOMINAL DISTENTION: 0
SHORTNESS OF BREATH: 0

## 2022-10-15 ASSESSMENT — PAIN DESCRIPTION - DESCRIPTORS: DESCRIPTORS: ACHING

## 2022-10-15 ASSESSMENT — PAIN DESCRIPTION - LOCATION: LOCATION: LEG

## 2022-10-15 ASSESSMENT — PAIN DESCRIPTION - ORIENTATION: ORIENTATION: RIGHT

## 2022-10-15 NOTE — ED PROVIDER NOTES
Emergency Department Provider Note                   PCP:                Alexandre Gibson DO               Age: 78 y.o. Sex: female       ICD-10-CM    1. Right leg pain  M79.604           DISPOSITION Decision To Discharge 10/15/2022 08:49:31 PM        MDM  Number of Diagnoses or Management Options  Right leg pain: new, needed workup  Diagnosis management comments: 70-year-old female who presents emergency department today with complaint of right lower leg pain. Physical exam is unremarkable. She does have bilateral lower extremity edema. She appears neurovascularly intact to bilateral lower extremities. Ultrasound is negative for DVT. No signs of infectious process. She is afebrile. Reassurance provided. Encouraged rest, Tylenol, and elevation. Encouraged close follow-up with her primary care provider. Red flag symptoms and return precautions discussed. Patient verbalized understanding agreement with instructions, treatment plan, discharge. Risk of Complications, Morbidity, and/or Mortality  Presenting problems: low  Diagnostic procedures: low  Management options: low    Patient Progress  Patient progress: improved       ED Course as of 10/15/22 2139   Sat Oct 15, 2022   2043 Vascular duplex lower extremity venous right  FINDINGS:     The common femoral, femoral, popliteal, posterior tibial and peroneal veins are  patent and compressible. There is color Doppler flow. No evidence of DVT. IMPRESSION:     1. No evidence of deep vein thrombosis.  [BC]      ED Course User Index  [BC] Era Valente, APRN - CNP        Orders Placed This Encounter   Procedures    Vascular duplex lower extremity venous right        Medications   acetaminophen (TYLENOL) tablet 650 mg (650 mg Oral Given 10/15/22 2100)       Discharge Medication List as of 10/15/2022  8:54 PM           Poli Michel is a 78 y.o. female who presents to the Emergency Department with chief complaint of    Chief Complaint   Patient presents with    Leg Pain     Patient presents complaining of right leg pain. States pain extends from anterior knee down to toes. Denies trauma/injury. Swelling noted in both legs, worse in right. No significant redness or warmth noted. \"It just feels so heavy. \"       24-year-old female who presents emergency department today with complaint of right lower leg pain. Patient states that pain goes from the knee down to her leg she denies any falls, trauma, or known injury. She denies any chest pain, abdominal pain, shortness of breath, fever, or chills. She reports swelling in her legs but states that that is not new. Pain is worse with ambulation. Pain is relieved at rest.    The history is provided by the patient. Review of Systems   Constitutional:  Negative for fever. Respiratory:  Negative for shortness of breath. Cardiovascular:  Negative for chest pain. Gastrointestinal:  Negative for abdominal distention. Musculoskeletal:  Positive for arthralgias. All other systems reviewed and are negative.     Past Medical History:   Diagnosis Date    Acute cholecystitis 11/6/2018    Acute pyelonephritis 1/14/2017    CAD (coronary artery disease)     Cerebrovascular accident ([facility] [address] 4/10/2019    COVID-19 virus infection 6/29/2020    Diabetes ([facility] [address]     Hypertension     Other ill-defined conditions(799.89)     choelsterol    Stroke [facility])         Past Surgical History:   Procedure Laterality Date    [place] CARDIAC SURG PROCEDURE UNLIST      stented - doesn't know date        Family History   Problem Relation Age of Onset    Diabetes Father     Hypertension Father     High Cholesterol Father     Diabetes Mother     Heart Disease Mother     Hypertension Mother     High Cholesterol Mother         Social History     Socioeconomic History    Marital status: Legally    Tobacco Use    Smoking status: Never    Smokeless tobacco: Never   Substance and Sexual Activity    Alcohol use: Never    Drug use: Never Codeine and Sulfa antibiotics     Discharge Medication List as of 10/15/2022  8:54 PM        CONTINUE these medications which have NOT CHANGED    Details   potassium chloride (KLOR-CON M) 20 MEQ extended release tablet Take 1 tablet by mouth 2 times daily for 5 days, Disp-10 tablet, R-0Print      !! tamsulosin (FLOMAX) 0.4 MG capsule Take 1 capsule by mouth every evening, Disp-90 capsule, R-3Normal      !! tamsulosin (FLOMAX) 0.4 MG capsule Take 1 capsule by mouth every evening, Disp-30 capsule, R-11Normal      acetaminophen (TYLENOL) 500 MG tablet Take 500 mg by mouth every 6 hours as neededHistorical Med      amLODIPine (NORVASC) 5 MG tablet Take 5 mg by mouth dailyHistorical Med      aspirin 81 MG EC tablet Take 81 mg by mouth 2 times dailyHistorical Med      !! atorvastatin (LIPITOR) 40 MG tablet atorvastatin 40 mg tablet   Take 1 tablet every day by oral route. Historical Med      !! atorvastatin (LIPITOR) 80 MG tablet atorvastatin 80 mg tablet   Take 1 tablet every day by oral route for 90 days. Historical Med      carvedilol (COREG) 6.25 MG tablet carvedilol 6.25 mg tablet   Take 1 tablet twice a day by oral route for 90 days. Historical Med      vitamin D (CHOLECALCIFEROL) 25 MCG (1000 UT) TABS tablet Take 1,000 Units by mouth dailyHistorical Med      clopidogrel (PLAVIX) 75 MG tablet clopidogrel 75 mg tablet   TAKE 1 TABLET DAILY. Historical Med      hydroCHLOROthiazide (HYDRODIURIL) 25 MG tablet hydrochlorothiazide 25 mg tablet   Take 1 tablet every day by oral route for 30 days. Historical Med      HYDROcodone homatropine (HYCODAN) 5-1.5 MG/5ML solution Take 5 mLs by mouth 4 times daily as needed. Historical Med      insulin glargine (LANTUS) 100 UNIT/ML injection vial Inject 13 Units into the skinHistorical Med      loratadine (CLARITIN) 10 MG tablet loratadine 10 mg tablet   Take 1 tablet every day by oral route. Historical Med      losartan (COZAAR) 50 mg tablet losartan 50 mg tabletHistorical Med metFORMIN (GLUCOPHAGE) 1000 MG tablet Take 1,000 mg by mouth 2 times daily (with meals)Historical Med      ondansetron (ZOFRAN-ODT) 4 MG disintegrating tablet Take 4 mg by mouth 3 times daily as neededHistorical Med      SITagliptin (JANUVIA) 100 MG tablet Januvia 100 mg tablet   Take 1 tablet every day by oral route for 90 days. Historical Med       !! - Potential duplicate medications found. Please discuss with provider. Vitals signs and nursing note reviewed. Patient Vitals for the past 4 hrs:   Temp Pulse Resp BP SpO2   10/15/22 2030 98 °F (36.7 °C) 80 18 112/60 95 %          Physical Exam  Vitals and nursing note reviewed. Constitutional:       General: She is not in acute distress. Appearance: Normal appearance. She is well-developed. She is not ill-appearing, toxic-appearing or diaphoretic. HENT:      Head: Normocephalic and atraumatic. Mouth/Throat:      Mouth: Mucous membranes are moist.   Eyes:      General: No scleral icterus. Extraocular Movements: Extraocular movements intact. Cardiovascular:      Rate and Rhythm: Normal rate. Pulses:           Dorsalis pedis pulses are 2+ on the right side and 2+ on the left side. Posterior tibial pulses are 2+ on the right side and 2+ on the left side. Heart sounds: Normal heart sounds. Pulmonary:      Effort: Pulmonary effort is normal. No respiratory distress. Breath sounds: Normal breath sounds. Abdominal:      General: Abdomen is flat. There is no distension. Musculoskeletal:      Right knee: No swelling or deformity. Normal range of motion. Right lower le+ Edema present. Left lower le+ Edema present. Comments: Pitting edema in bilateral lower extremities. Patient exhibits full range of motion of bilateral knees, ankles, and feet. She has intact sensation, capillary refill, and pedal pulses. No wounds noted to the right lower extremity. Skin:     General: Skin is warm and dry. Capillary Refill: Capillary refill takes less than 2 seconds. Neurological:      General: No focal deficit present. Mental Status: She is alert and oriented to person, place, and time. Psychiatric:         Mood and Affect: Mood normal.         Behavior: Behavior normal.        Procedures    Results for orders placed or performed during the hospital encounter of 10/15/22   Vascular duplex lower extremity venous right    Narrative    Clinical history: Swelling. TECHNIQUE: Grayscale and color Doppler venous ultrasound of the right lower  extremity. FINDINGS:    The common femoral, femoral, popliteal, posterior tibial and peroneal veins are  patent and compressible. There is color Doppler flow. No evidence of DVT. Impression    1. No evidence of deep vein thrombosis. Vascular duplex lower extremity venous right   Final Result      1. No evidence of deep vein thrombosis. Voice dictation software was used during the making of this note. This software is not perfect and grammatical and other typographical errors may be present. This note has not been completely proofread for errors.        GILDARDO Leslie - [facility]  10/15/22 [id number]

## 2022-10-15 NOTE — ED NOTES
Pt's brief and linens changed. Pt and family updated on plan of care.       Kayla Joseph RN  10/15/22 1931

## 2022-10-15 NOTE — ED TRIAGE NOTES
Patient presents complaining of right leg pain. States pain extends from anterior knee down to toes. Denies trauma/injury. Swelling noted in both legs, worse in right. No significant redness or warmth noted.

## 2022-10-16 NOTE — DISCHARGE INSTRUCTIONS
As we discussed, your ultrasound is normal today. Elevate your legs when at rest to help decrease swelling. Take Tylenol if needed for pain. Follow-up with your primary care provider. Return to the emergency department for any new, worsening, or concerning symptoms.

## 2022-10-16 NOTE — ED NOTES
I have reviewed discharge instructions with the patient. The patient verbalized understanding. Patient left ED via Discharge Method: wheelchair to Home with son. Opportunity for questions and clarification provided. Patient given 0 scripts.         Lilly Matson RN  10/15/22 [id number]

## 2022-10-31 ENCOUNTER — OFFICE VISIT (OUTPATIENT)
Dept: UROLOGY | Age: 77
End: 2022-10-31
Payer: MEDICARE

## 2022-10-31 DIAGNOSIS — R39.14 FEELING OF INCOMPLETE BLADDER EMPTYING: Primary | ICD-10-CM

## 2022-10-31 DIAGNOSIS — N39.0 COMPLICATED UTI (URINARY TRACT INFECTION): ICD-10-CM

## 2022-10-31 LAB — PVR, POC: ABNORMAL CC

## 2022-10-31 PROCEDURE — 3017F COLORECTAL CA SCREEN DOC REV: CPT | Performed by: NURSE PRACTITIONER

## 2022-10-31 PROCEDURE — 1090F PRES/ABSN URINE INCON ASSESS: CPT | Performed by: NURSE PRACTITIONER

## 2022-10-31 PROCEDURE — 51701 INSERT BLADDER CATHETER: CPT | Performed by: NURSE PRACTITIONER

## 2022-10-31 PROCEDURE — G8400 PT W/DXA NO RESULTS DOC: HCPCS | Performed by: NURSE PRACTITIONER

## 2022-10-31 PROCEDURE — G8417 CALC BMI ABV UP PARAM F/U: HCPCS | Performed by: NURSE PRACTITIONER

## 2022-10-31 PROCEDURE — 1036F TOBACCO NON-USER: CPT | Performed by: NURSE PRACTITIONER

## 2022-10-31 PROCEDURE — G8428 CUR MEDS NOT DOCUMENT: HCPCS | Performed by: NURSE PRACTITIONER

## 2022-10-31 PROCEDURE — 1123F ACP DISCUSS/DSCN MKR DOCD: CPT | Performed by: NURSE PRACTITIONER

## 2022-10-31 PROCEDURE — G8484 FLU IMMUNIZE NO ADMIN: HCPCS | Performed by: NURSE PRACTITIONER

## 2022-10-31 PROCEDURE — 99214 OFFICE O/P EST MOD 30 MIN: CPT | Performed by: NURSE PRACTITIONER

## 2022-10-31 PROCEDURE — 51798 US URINE CAPACITY MEASURE: CPT | Performed by: NURSE PRACTITIONER

## 2022-10-31 RX ORDER — NITROFURANTOIN MACROCRYSTALS 100 MG/1
100 CAPSULE ORAL 2 TIMES DAILY
Qty: 42 CAPSULE | Refills: 0 | Status: SHIPPED | OUTPATIENT
Start: 2022-10-31 | End: 2022-11-21

## 2022-10-31 RX ORDER — NITROFURANTOIN MACROCRYSTALS 100 MG/1
100 CAPSULE ORAL 4 TIMES DAILY
Qty: 42 CAPSULE | Refills: 0 | Status: SHIPPED | OUTPATIENT
Start: 2022-10-31 | End: 2022-10-31

## 2022-10-31 NOTE — PROGRESS NOTES
[address] [place] [address], [facility] [address]  [phone]          Alvaro Gama  : 1945    Chief Complaint   Patient presents with    Follow-up          HPI     Alvaro Gama is a 78 y.o. female patient returns today for follow-up on incomplete bladder emptying and recurrent urinary infections. PVR at her last visit was 159 mL, prior to that the PVR was 380 mL. She has been taking Flomax 0.4 mg daily and she and her family feel that it has helped her tremendously. She was accompanied today by a caregiver. While patient tried to collect us a urine sample we noticed blood in the urine. Her PVR today was also 340 mL. The caregiver tells me that they have noticed that she does not feel as well as she has prior. Initially seen for hospital follow-up after being admitted for sepsis due to E. coli with encephalopathy. Urinary retention was also found. The daughter had reported that the patient had had weakness for little over a week. The confusion however started the day of her ER visit. Cath urine in the ER revealed large leukocytes she was admitted for UTI and IV antibiotic therapy. Sepsis 2/2 E coli complicated UTI - 2/2 urinary retention from OAB. Ucx >100K E coli CFU/ml. S/p  Rocephin IV x 5 doses. Complete course of abx with keflex at home. Positive blood culture - 1/2 BC sets GPC in aerobic bottle on , coag neg staph suggestive of skin contaminant. . Repeat BC  NGTD. S/p vanc x 1 dose. Patient is here now for evaluation. She says prior to this hospital admission she has had a couple of urinary infections. The last infection was 2 years ago. Patient reports she voids without difficulty. She has never had retention issues. She is accompanied by her granddaughter today. There is no fever chills or hematuria since being home. The urine overall has been clear.      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 Other PMHx obesity, CAD s/p MICKI to RCA 2007, T2DM, HTN, HLD, anemia      Past Medical History:   Diagnosis Date    Acute cholecystitis 11/6/2018    Acute pyelonephritis 1/14/2017    CAD (coronary artery disease)     Cerebrovascular accident ([facility] [address] 4/10/2019    COVID-19 virus infection 6/29/2020    Diabetes ([facility] [address]     Hypertension     Other ill-defined conditions(799.89)     choelsterol    Stroke [facility])      Past Surgical History:   Procedure Laterality Date    [place] CARDIAC SURG PROCEDURE UNLIST      stented - doesn't know date     Current Outpatient Medications   Medication Sig Dispense Refill    nitrofurantoin (MACRODANTIN) 100 MG capsule Take 1 capsule by mouth in the morning and at bedtime for 21 days 42 capsule 0    tamsulosin (FLOMAX) 0.4 MG capsule Take 1 capsule by mouth every evening 90 capsule 3    tamsulosin (FLOMAX) 0.4 MG capsule Take 1 capsule by mouth every evening 30 capsule 11    acetaminophen (TYLENOL) 500 MG tablet Take 500 mg by mouth every 6 hours as needed      amLODIPine (NORVASC) 5 MG tablet Take 5 mg by mouth daily      aspirin 81 MG EC tablet Take 81 mg by mouth 2 times daily      atorvastatin (LIPITOR) 40 MG tablet atorvastatin 40 mg tablet   Take 1 tablet every day by oral route. atorvastatin (LIPITOR) 80 MG tablet atorvastatin 80 mg tablet   Take 1 tablet every day by oral route for 90 days. carvedilol (COREG) 6.25 MG tablet carvedilol 6.25 mg tablet   Take 1 tablet twice a day by oral route for 90 days. vitamin D (CHOLECALCIFEROL) 25 MCG (1000 UT) TABS tablet Take 1,000 Units by mouth daily      clopidogrel (PLAVIX) 75 MG tablet clopidogrel 75 mg tablet   TAKE 1 TABLET DAILY. hydroCHLOROthiazide (HYDRODIURIL) 25 MG tablet hydrochlorothiazide 25 mg tablet   Take 1 tablet every day by oral route for 30 days. HYDROcodone homatropine (HYCODAN) 5-1.5 MG/5ML solution Take 5 mLs by mouth 4 times daily as needed.       insulin glargine (LANTUS) 100 UNIT/ML injection vial Inject 13 Units into the skin      loratadine (CLARITIN) 10 MG tablet loratadine 10 mg tablet   Take 1 tablet every day by oral route. losartan (COZAAR) 50 mg tablet losartan 50 mg tablet      metFORMIN (GLUCOPHAGE) 1000 MG tablet Take 1,000 mg by mouth 2 times daily (with meals)      ondansetron (ZOFRAN-ODT) 4 MG disintegrating tablet Take 4 mg by mouth 3 times daily as needed      SITagliptin (JANUVIA) 100 MG tablet Januvia 100 mg tablet   Take 1 tablet every day by oral route for 90 days. potassium chloride (KLOR-CON M) 20 MEQ extended release tablet Take 1 tablet by mouth 2 times daily for 5 days 10 tablet 0     No current facility-administered medications for this visit.      Allergies   Allergen Reactions    Codeine Nausea And Vomiting    Sulfa Antibiotics Nausea And Vomiting and Hives     Daughter reports but unsure of reaction       Social History     Socioeconomic History    Marital status: Legally      Spouse name: Not on file    Number of children: Not on file    Years of education: Not on file    Highest education level: Not on file   Occupational History    Not on file   Tobacco Use    Smoking status: Never    Smokeless tobacco: Never   Substance and Sexual Activity    Alcohol use: Never    Drug use: Never    Sexual activity: Not on file   Other Topics Concern    Not on file   Social History Narrative    Not on file     Social Determinants of Health     Financial Resource Strain: Not on file   Food Insecurity: Not on file   Transportation Needs: Not on file   Physical Activity: Not on file   Stress: Not on file   Social Connections: Not on file   Intimate Partner Violence: Not on file   Housing Stability: Not on file     Family History   Problem Relation Age of Onset    Diabetes Father     Hypertension Father     High Cholesterol Father     Diabetes Mother     Heart Disease Mother     Hypertension Mother     High Cholesterol Mother        ROS  See HPI    Urinalysis  UA - Dipstick  Results for orders placed or performed in visit on 07/14/22   AMB POC URINALYSIS DIP STICK AUTO W/O MICRO   Result Value Ref Range    Color (UA POC)      Clarity (UA POC)      Glucose, Urine, POC Negative Negative    Bilirubin, Urine, POC Negative Negative    KETONES, Urine, POC Negative Negative    Specific Gravity, Urine, POC 1.020 1.001 - 1.035    Blood (UA POC) Large Negative    pH, Urine, POC 6.0 4.6 - 8.0    Protein, Urine,  Negative    Urobilinogen, POC 0.2 mg/dL     Nitrite, Urine, POC Negative Negative    Leukocyte Esterase, Urine, POC Large Negative       PHYSICAL EXAM    GENERAL: No acute distress, Awake, Alert, Oriented X 3, Gait normal  ABDOMEN: soft, non tender, non-distended, positive bowel sounds, no organomegaly, no palpable masses, no guarding, no rebound tenderness  SKIN: No rash, no erythema, no lacerations or abrasions, no ecchymosis  MUSCULOSKELETAL - MAEW, no edema   Pelvic- urethra has blood seen at the meatus. Patient placed in supine position and the urethral meatus was cleansed with antiseptic. 14 fr non-indwelling catheter was placed without incident. Urine returned from the catheter no bleeding, no resistance. Catheter was removed and the patient tolerated the procedure well. Assessment and Plan    ICD-10-CM    1. Feeling of incomplete bladder emptying  R39.14 AMB POC PVR, MAI,POST-VOID RES,US,NON-IMAGING     INSERT,NON-INDWELLING BLADDER CATHETER      2. Complicated UTI (urinary tract infection)  N39.0 Culture, Urine     Culture, Urine        PLAN:  We cath the patient for 300 mL of urine. It was blood stained urine. A culture will be obtained. We will start Macrobid 100 mg twice a day. She will return to the office in 2 weeks for recheck. I have explained to she and the caregiver that if any symptoms change including fever altered mental status or lethargy that they should go directly to the ER. They verbalized understanding.     Myke Cuellar, LUIS, APRN - NP  Dr. Mendel Laser is supervising physician today and he approves plan of care. Return in about 2 weeks (around 11/14/2022). Elements of this note have been dictated using speech recognition software. Although reviewed, errors of speech recognition may have occurred.

## 2022-11-04 LAB
BACTERIA SPEC CULT: ABNORMAL
BACTERIA SPEC CULT: ABNORMAL
SERVICE CMNT-IMP: ABNORMAL

## 2022-11-14 ENCOUNTER — OFFICE VISIT (OUTPATIENT)
Dept: UROLOGY | Age: 77
End: 2022-11-14
Payer: MEDICARE

## 2022-11-14 DIAGNOSIS — N39.0 COMPLICATED UTI (URINARY TRACT INFECTION): Primary | ICD-10-CM

## 2022-11-14 DIAGNOSIS — R39.14 FEELING OF INCOMPLETE BLADDER EMPTYING: ICD-10-CM

## 2022-11-14 PROCEDURE — 1090F PRES/ABSN URINE INCON ASSESS: CPT | Performed by: NURSE PRACTITIONER

## 2022-11-14 PROCEDURE — 99213 OFFICE O/P EST LOW 20 MIN: CPT | Performed by: NURSE PRACTITIONER

## 2022-11-14 PROCEDURE — 1123F ACP DISCUSS/DSCN MKR DOCD: CPT | Performed by: NURSE PRACTITIONER

## 2022-11-14 PROCEDURE — G8484 FLU IMMUNIZE NO ADMIN: HCPCS | Performed by: NURSE PRACTITIONER

## 2022-11-14 PROCEDURE — G8417 CALC BMI ABV UP PARAM F/U: HCPCS | Performed by: NURSE PRACTITIONER

## 2022-11-14 PROCEDURE — G8428 CUR MEDS NOT DOCUMENT: HCPCS | Performed by: NURSE PRACTITIONER

## 2022-11-14 PROCEDURE — 3017F COLORECTAL CA SCREEN DOC REV: CPT | Performed by: NURSE PRACTITIONER

## 2022-11-14 PROCEDURE — G8400 PT W/DXA NO RESULTS DOC: HCPCS | Performed by: NURSE PRACTITIONER

## 2022-11-14 PROCEDURE — 1036F TOBACCO NON-USER: CPT | Performed by: NURSE PRACTITIONER

## 2022-11-14 RX ORDER — NITROFURANTOIN 25; 75 MG/1; MG/1
100 CAPSULE ORAL DAILY
Qty: 21 CAPSULE | Refills: 0 | Status: SHIPPED | OUTPATIENT
Start: 2022-11-14 | End: 2022-11-14 | Stop reason: CLARIF

## 2022-11-14 RX ORDER — NITROFURANTOIN 25; 75 MG/1; MG/1
100 CAPSULE ORAL DAILY
Qty: 21 CAPSULE | Refills: 0 | Status: SHIPPED | OUTPATIENT
Start: 2022-11-14 | End: 2022-12-05

## 2022-11-14 ASSESSMENT — ENCOUNTER SYMPTOMS
NAUSEA: 0
BACK PAIN: 0

## 2022-11-14 NOTE — PROGRESS NOTES
[address] [place] [address], [facility] [address]  [phone]          Truong Philip  : 1945    Chief Complaint   Patient presents with    Follow-up          HPI     Truong Philip is a 78 y.o. female    follow-up on incomplete bladder emptying and recurrent urinary infections. At her visit a couple weeks ago she was experiencing some gross hematuria. Urine culture was obtained. It grew Staphylococcus. It was sensitive to [facility] and she continues to use this daily. She is feeling better. She is accompanied again today by her caregiver. PVR at her last visit was 159 mL, prior to that the PVR was 380 mL. She has been taking Flomax 0.4 mg daily and she and her family feel that it has helped her tremendously. Initially seen for hospital follow-up after being admitted for sepsis due to E. coli with encephalopathy. Urinary retention was also found. The daughter had reported that the patient had had weakness for little over a week. The confusion however started the day of her ER visit. Cath urine in the ER revealed large leukocytes she was admitted for UTI and IV antibiotic therapy. Sepsis 2/2 E coli complicated UTI - 2/2 urinary retention from OAB. Ucx >100K E coli CFU/ml. S/p  Rocephin IV x 5 doses. Complete course of abx with keflex at home. Positive blood culture - 1/2 BC sets GPC in aerobic bottle on , coag neg staph suggestive of skin contaminant. . Repeat BC  NGTD. S/p vanc x 1 dose. Patient is here now for evaluation. She says prior to this hospital admission she has had a couple of urinary infections. The last infection was 2 years ago. Patient reports she voids without difficulty. She has never had retention issues. She is accompanied by her granddaughter today. There is no fever chills or hematuria since being home. The urine overall has been clear.      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 Other PMHx obesity, CAD s/p MICKI to RCA 2007, T2DM, HTN, HLD, anemia         Past Medical History:   Diagnosis Date    Acute cholecystitis 11/6/2018    Acute pyelonephritis 1/14/2017    CAD (coronary artery disease)     Cerebrovascular accident ([facility] [address] 4/10/2019    COVID-19 virus infection 6/29/2020    Diabetes ([facility] [address]     Hypertension     Other ill-defined conditions(799.89)     choelsterol    Stroke [facility])      Past Surgical History:   Procedure Laterality Date    [place] CARDIAC SURG PROCEDURE UNLIST      stented - doesn't know date     Current Outpatient Medications   Medication Sig Dispense Refill    nitrofurantoin, macrocrystal-monohydrate, (MACROBID) 100 MG capsule Take 1 capsule by mouth daily for 21 days 21 capsule 0    nitrofurantoin (MACRODANTIN) 100 MG capsule Take 1 capsule by mouth in the morning and at bedtime for 21 days 42 capsule 0    tamsulosin (FLOMAX) 0.4 MG capsule Take 1 capsule by mouth every evening 90 capsule 3    tamsulosin (FLOMAX) 0.4 MG capsule Take 1 capsule by mouth every evening 30 capsule 11    acetaminophen (TYLENOL) 500 MG tablet Take 500 mg by mouth every 6 hours as needed      amLODIPine (NORVASC) 5 MG tablet Take 5 mg by mouth daily      aspirin 81 MG EC tablet Take 81 mg by mouth 2 times daily      atorvastatin (LIPITOR) 40 MG tablet atorvastatin 40 mg tablet   Take 1 tablet every day by oral route. atorvastatin (LIPITOR) 80 MG tablet atorvastatin 80 mg tablet   Take 1 tablet every day by oral route for 90 days. carvedilol (COREG) 6.25 MG tablet carvedilol 6.25 mg tablet   Take 1 tablet twice a day by oral route for 90 days. vitamin D (CHOLECALCIFEROL) 25 MCG (1000 UT) TABS tablet Take 1,000 Units by mouth daily      clopidogrel (PLAVIX) 75 MG tablet clopidogrel 75 mg tablet   TAKE 1 TABLET DAILY. hydroCHLOROthiazide (HYDRODIURIL) 25 MG tablet hydrochlorothiazide 25 mg tablet   Take 1 tablet every day by oral route for 30 days.       HYDROcodone homatropine (HYCODAN) 5-1.5 MG/5ML solution Take 5 mLs by mouth 4 times daily as needed. insulin glargine (LANTUS) 100 UNIT/ML injection vial Inject 13 Units into the skin      loratadine (CLARITIN) 10 MG tablet loratadine 10 mg tablet   Take 1 tablet every day by oral route. losartan (COZAAR) 50 mg tablet losartan 50 mg tablet      metFORMIN (GLUCOPHAGE) 1000 MG tablet Take 1,000 mg by mouth 2 times daily (with meals)      ondansetron (ZOFRAN-ODT) 4 MG disintegrating tablet Take 4 mg by mouth 3 times daily as needed      SITagliptin (JANUVIA) 100 MG tablet Januvia 100 mg tablet   Take 1 tablet every day by oral route for 90 days. potassium chloride (KLOR-CON M) 20 MEQ extended release tablet Take 1 tablet by mouth 2 times daily for 5 days 10 tablet 0     No current facility-administered medications for this visit.      Allergies   Allergen Reactions    Codeine Nausea And Vomiting    Sulfa Antibiotics Nausea And Vomiting and Hives     Daughter reports but unsure of reaction       Social History     Socioeconomic History    Marital status: Legally      Spouse name: Not on file    Number of children: Not on file    Years of education: Not on file    Highest education level: Not on file   Occupational History    Not on file   Tobacco Use    Smoking status: Never    Smokeless tobacco: Never   Substance and Sexual Activity    Alcohol use: Never    Drug use: Never    Sexual activity: Not on file   Other Topics Concern    Not on file   Social History Narrative    Not on file     Social Determinants of Health     Financial Resource Strain: Not on file   Food Insecurity: Not on file   Transportation Needs: Not on file   Physical Activity: Not on file   Stress: Not on file   Social Connections: Not on file   Intimate Partner Violence: Not on file   Housing Stability: Not on file     Family History   Problem Relation Age of Onset    Diabetes Father     Hypertension Father     High Cholesterol Father     Diabetes Mother     Heart Disease Mother     Hypertension Mother     High Cholesterol Mother        Review of Systems  Constitutional:   Negative for fever. GI:  Negative for nausea. Musculoskeletal:  Negative for back pain. Assessment and Plan    ICD-10-CM    1. Complicated UTI (urinary tract infection)  N39.0       2. Feeling of incomplete bladder emptying  R39.14         PLAN:  I recommend that she stay on the Macrobid daily until return visit  Continue tamsulosin 0.4 mg daily  Follow-up in 3 months for urine check, PVR and creatinine. Kinza Sprung, APRN - NP  Dr. Grand marais is supervising physician today and he approves plan of care. Return in about 3 months (around 2/14/2023) for for recheck, U/A, PVR and OV. Elements of this note have been dictated using speech recognition software. Although reviewed, errors of speech recognition may have occurred.

## 2022-12-09 ENCOUNTER — HOSPITAL ENCOUNTER (INPATIENT)
Age: 77
LOS: 3 days | Discharge: HOME | DRG: 690 | End: 2022-12-12
Attending: EMERGENCY MEDICINE | Admitting: FAMILY MEDICINE
Payer: MEDICARE

## 2022-12-09 ENCOUNTER — HOSPITAL ENCOUNTER (EMERGENCY)
Dept: GENERAL RADIOLOGY | Age: 77
Discharge: HOME | DRG: 690 | End: 2022-12-12
Payer: MEDICARE

## 2022-12-09 DIAGNOSIS — R53.1 GENERALIZED WEAKNESS: ICD-10-CM

## 2022-12-09 DIAGNOSIS — N30.00 ACUTE CYSTITIS WITHOUT HEMATURIA: ICD-10-CM

## 2022-12-09 DIAGNOSIS — N17.9 AKI (ACUTE KIDNEY INJURY) (HCC): ICD-10-CM

## 2022-12-09 DIAGNOSIS — R53.83 FATIGUE, UNSPECIFIED TYPE: Primary | ICD-10-CM

## 2022-12-09 LAB
ALBUMIN SERPL-MCNC: 2.2 G/DL (ref 3.2–4.6)
ALBUMIN/GLOB SERPL: 0.4 {RATIO} (ref 0.4–1.6)
ALP SERPL-CCNC: 60 U/L (ref 50–136)
ALT SERPL-CCNC: 42 U/L (ref 12–65)
ANION GAP SERPL CALC-SCNC: 8 MMOL/L (ref 2–11)
APPEARANCE UR: ABNORMAL
AST SERPL-CCNC: 25 U/L (ref 15–37)
BACTERIA URNS QL MICRO: ABNORMAL /HPF
BASOPHILS # BLD: 0.1 K/UL (ref 0–0.2)
BASOPHILS NFR BLD: 1 % (ref 0–2)
BILIRUB SERPL-MCNC: 0.5 MG/DL (ref 0.2–1.1)
BILIRUB UR QL: NEGATIVE
BUN SERPL-MCNC: 45 MG/DL (ref 8–23)
CALCIUM SERPL-MCNC: 9.1 MG/DL (ref 8.3–10.4)
CASTS URNS QL MICRO: ABNORMAL /LPF
CHLORIDE SERPL-SCNC: 101 MMOL/L (ref 101–110)
CO2 SERPL-SCNC: 25 MMOL/L (ref 21–32)
COLOR UR: ABNORMAL
CREAT SERPL-MCNC: 2.13 MG/DL (ref 0.6–1)
CRYSTALS URNS QL MICRO: 0 /LPF
DIFFERENTIAL METHOD BLD: ABNORMAL
EOSINOPHIL # BLD: 0.3 K/UL (ref 0–0.8)
EOSINOPHIL NFR BLD: 3 % (ref 0.5–7.8)
EPI CELLS #/AREA URNS HPF: ABNORMAL /HPF
ERYTHROCYTE [DISTWIDTH] IN BLOOD BY AUTOMATED COUNT: 14.6 % (ref 11.9–14.6)
FLUAV AG NPH QL IA: NEGATIVE
FLUBV AG NPH QL IA: NEGATIVE
GLOBULIN SER CALC-MCNC: 5.7 G/DL (ref 2.8–4.5)
GLUCOSE SERPL-MCNC: 255 MG/DL (ref 65–100)
GLUCOSE UR STRIP.AUTO-MCNC: NEGATIVE MG/DL
HCT VFR BLD AUTO: 26 % (ref 35.8–46.3)
HGB BLD-MCNC: 8.3 G/DL (ref 11.7–15.4)
HGB UR QL STRIP: ABNORMAL
IMM GRANULOCYTES # BLD AUTO: 0 K/UL (ref 0–0.5)
IMM GRANULOCYTES NFR BLD AUTO: 0 % (ref 0–5)
KETONES UR QL STRIP.AUTO: NEGATIVE MG/DL
LEUKOCYTE ESTERASE UR QL STRIP.AUTO: ABNORMAL
LIPASE SERPL-CCNC: 143 U/L (ref 73–393)
LYMPHOCYTES # BLD: 4.4 K/UL (ref 0.5–4.6)
LYMPHOCYTES NFR BLD: 43 % (ref 13–44)
MCH RBC QN AUTO: 27.9 PG (ref 26.1–32.9)
MCHC RBC AUTO-ENTMCNC: 31.9 G/DL (ref 31.4–35)
MCV RBC AUTO: 87.2 FL (ref 82–102)
MONOCYTES # BLD: 1.3 K/UL (ref 0.1–1.3)
MONOCYTES NFR BLD: 13 % (ref 4–12)
MUCOUS THREADS URNS QL MICRO: 0 /LPF
NEUTS SEG # BLD: 4 K/UL (ref 1.7–8.2)
NEUTS SEG NFR BLD: 40 % (ref 43–78)
NITRITE UR QL STRIP.AUTO: NEGATIVE
NRBC # BLD: 0 K/UL (ref 0–0.2)
OTHER OBSERVATIONS: ABNORMAL
PH UR STRIP: 8 [PH] (ref 5–9)
PLATELET # BLD AUTO: 363 K/UL (ref 150–450)
PLATELET COMMENT: ADEQUATE
PMV BLD AUTO: 8.6 FL (ref 9.4–12.3)
POTASSIUM SERPL-SCNC: 4.8 MMOL/L (ref 3.5–5.1)
PROT SERPL-MCNC: 7.9 G/DL (ref 6.3–8.2)
PROT UR STRIP-MCNC: 300 MG/DL
RBC # BLD AUTO: 2.98 M/UL (ref 4.05–5.2)
RBC #/AREA URNS HPF: >100 /HPF
RBC MORPH BLD: ABNORMAL
RBC MORPH BLD: ABNORMAL
SARS-COV-2 RDRP RESP QL NAA+PROBE: NOT DETECTED
SODIUM SERPL-SCNC: 134 MMOL/L (ref 133–143)
SOURCE: NORMAL
SP GR UR REFRACTOMETRY: 1.01 (ref 1–1.02)
SPECIMEN SOURCE: NORMAL
URINE CULTURE IF INDICATED: ABNORMAL
UROBILINOGEN UR QL STRIP.AUTO: 1 EU/DL (ref 0.2–1)
WBC # BLD AUTO: 10.1 K/UL (ref 4.3–11.1)
WBC MORPH BLD: ABNORMAL
WBC URNS QL MICRO: >100 /HPF

## 2022-12-09 PROCEDURE — 87086 URINE CULTURE/COLONY COUNT: CPT

## 2022-12-09 PROCEDURE — 36415 COLL VENOUS BLD VENIPUNCTURE: CPT

## 2022-12-09 PROCEDURE — 87635 SARS-COV-2 COVID-19 AMP PRB: CPT

## 2022-12-09 PROCEDURE — 85025 COMPLETE CBC W/AUTO DIFF WBC: CPT

## 2022-12-09 PROCEDURE — 2580000003 HC RX 258: Performed by: EMERGENCY MEDICINE

## 2022-12-09 PROCEDURE — 87040 BLOOD CULTURE FOR BACTERIA: CPT

## 2022-12-09 PROCEDURE — 81001 URINALYSIS AUTO W/SCOPE: CPT

## 2022-12-09 PROCEDURE — 80053 COMPREHEN METABOLIC PANEL: CPT

## 2022-12-09 PROCEDURE — 96374 THER/PROPH/DIAG INJ IV PUSH: CPT | Performed by: EMERGENCY MEDICINE

## 2022-12-09 PROCEDURE — 6360000002 HC RX W HCPCS: Performed by: EMERGENCY MEDICINE

## 2022-12-09 PROCEDURE — 71046 X-RAY EXAM CHEST 2 VIEWS: CPT

## 2022-12-09 PROCEDURE — 87804 INFLUENZA ASSAY W/OPTIC: CPT

## 2022-12-09 PROCEDURE — 51701 INSERT BLADDER CATHETER: CPT | Performed by: EMERGENCY MEDICINE

## 2022-12-09 PROCEDURE — 1100000000 HC RM PRIVATE

## 2022-12-09 PROCEDURE — 99285 EMERGENCY DEPT VISIT HI MDM: CPT | Performed by: EMERGENCY MEDICINE

## 2022-12-09 PROCEDURE — 83690 ASSAY OF LIPASE: CPT

## 2022-12-09 PROCEDURE — 96375 TX/PRO/DX INJ NEW DRUG ADDON: CPT | Performed by: EMERGENCY MEDICINE

## 2022-12-09 RX ORDER — MORPHINE SULFATE 4 MG/ML
4 INJECTION, SOLUTION INTRAMUSCULAR; INTRAVENOUS
Status: COMPLETED | OUTPATIENT
Start: 2022-12-09 | End: 2022-12-09

## 2022-12-09 RX ORDER — SODIUM CHLORIDE, SODIUM LACTATE, POTASSIUM CHLORIDE, AND CALCIUM CHLORIDE .6; .31; .03; .02 G/100ML; G/100ML; G/100ML; G/100ML
1000 INJECTION, SOLUTION INTRAVENOUS ONCE
Status: COMPLETED | OUTPATIENT
Start: 2022-12-09 | End: 2022-12-09

## 2022-12-09 RX ADMIN — MORPHINE SULFATE 4 MG: 4 INJECTION INTRAVENOUS at 19:20

## 2022-12-09 RX ADMIN — SODIUM CHLORIDE, POTASSIUM CHLORIDE, SODIUM LACTATE AND CALCIUM CHLORIDE 1000 ML: 600; 310; 30; 20 INJECTION, SOLUTION INTRAVENOUS at 18:10

## 2022-12-09 RX ADMIN — SODIUM CHLORIDE 1000 MG: 900 INJECTION INTRAVENOUS at 19:21

## 2022-12-09 ASSESSMENT — ENCOUNTER SYMPTOMS
NAUSEA: 0
ABDOMINAL PAIN: 0
VOMITING: 0
EYE PAIN: 0
CHEST TIGHTNESS: 0
FACIAL SWELLING: 0
TROUBLE SWALLOWING: 0
BACK PAIN: 0
COUGH: 0
SHORTNESS OF BREATH: 0
VOICE CHANGE: 0
SORE THROAT: 0

## 2022-12-09 ASSESSMENT — PAIN SCALES - GENERAL
PAINLEVEL_OUTOF10: 10
PAINLEVEL_OUTOF10: 8
PAINLEVEL_OUTOF10: 4

## 2022-12-09 ASSESSMENT — PAIN DESCRIPTION - LOCATION
LOCATION: VAGINA

## 2022-12-09 NOTE — ED TRIAGE NOTES
Pt family states for past 3 days pt has been fatigued and has lack of appetite. Pt states no pain at this time.

## 2022-12-09 NOTE — ED NOTES
Pt Straight cath drained appox 200mls of dark brown thick urine with sediment. The last 50 mls was bright red blood with clots. Pt is in a lot of pain during and after the procedure. MD Notified waiting orders for pain. Pt urine sent to lab.      Artis Whitehead RN  12/09/22 [id number]

## 2022-12-09 NOTE — ED PROVIDER NOTES
Emergency Department Provider Note                   PCP:                Teofilo Baumgarten, DO               Age: 78 y.o. Sex: female       ICD-10-CM    1. Fatigue, unspecified type  R53.83       2. Acute cystitis without hematuria  N30.00       3. ELFEGO (acute kidney injury) ([facility] [address]  N17.9       4. Generalized weakness  R53.1           DISPOSITION Admitted 12/09/2022 07:30:27 PM       MDM  Number of Diagnoses or Management Options  Acute cystitis without hematuria  ELFEGO (acute kidney injury) ([facility] [address]  Fatigue, unspecified type  Generalized weakness  Diagnosis management comments: 82-year-old female presents for generalized weakness. Vital signs here are reviewed. Patient is in no acute distress in the room. She looks chronically sick. Weakness work appears initiated. EKG showed no obvious signs of STEMI. Lab work here is consistent for UTI. Patient was given IV Rocephin. At this point I have spoken to the hospitalist will admitting the patient for generalized weakness and UTI as well as acute kidney injury. Orders Placed This Encounter   Procedures    COVID-19, Rapid    Rapid influenza A/B antigens    Culture, Urine    Culture, Blood 1    Culture, Blood 1    XR CHEST (2 VW)    CT ABDOMEN PELVIS RENAL STONE    CBC with Auto Differential    Comprehensive Metabolic Panel    Lipase    Urinalysis with Reflex to Culture    Basic Metabolic Panel w/ Reflex to MG    CBC with Auto Differential    CBC with Auto Differential    Hemoglobin and Hematocrit    Occult Blood, Fecal    ADULT DIET;  Regular; 4 carb choices (60 gm/meal)    Straight cath    Cardiac Monitor - ED Only    Vital signs per unit routine    Admission/Observation order previously placed    Up with assistance    Full code    Contact isolation    Initiate Oxygen Therapy Protocol    POCT Glucose    POCT Glucose    POCT Glucose    POCT Glucose    POCT Glucose    EKG 12 Lead    ADMIT TO INPATIENT        Medications   losartan (COZAAR) tablet 50 mg ( Oral Automatically Held 12/13/22 0900)   hydroCHLOROthiazide (HYDRODIURIL) tablet 25 mg ( Oral Automatically Held 12/13/22 0900)   amLODIPine (NORVASC) tablet 5 mg (5 mg Oral Given 12/10/22 0834)   aspirin EC tablet 81 mg (81 mg Oral Given 12/10/22 2027)   atorvastatin (LIPITOR) tablet 80 mg (80 mg Oral Given 12/10/22 2027)   carvedilol (COREG) tablet 6.25 mg (6.25 mg Oral Given 12/10/22 1815)   clopidogrel (PLAVIX) tablet 75 mg (75 mg Oral Given 12/10/22 0834)   insulin glargine (LANTUS) injection vial 13 Units (13 Units SubCUTAneous Given 12/10/22 0836)   tamsulosin (FLOMAX) capsule 0.4 mg (0.4 mg Oral Given 12/10/22 1815)   cetirizine (ZYRTEC) tablet 10 mg (10 mg Oral Given 12/10/22 0834)   sodium chloride flush 0.9 % injection 5-40 mL (10 mLs IntraVENous Given 12/10/22 2026)   sodium chloride flush 0.9 % injection 5-40 mL (has no administration in time range)   0.9 % sodium chloride infusion (has no administration in time range)   enoxaparin Sodium (LOVENOX) injection 30 mg (30 mg SubCUTAneous Given 12/10/22 0836)   ondansetron (ZOFRAN-ODT) disintegrating tablet 4 mg (has no administration in time range)     Or   ondansetron (ZOFRAN) injection 4 mg (has no administration in time range)   polyethylene glycol (GLYCOLAX) packet 17 g (has no administration in time range)   acetaminophen (TYLENOL) tablet 650 mg (has no administration in time range)     Or   acetaminophen (TYLENOL) suppository 650 mg (has no administration in time range)   0.9 % sodium chloride infusion ( IntraVENous New Bag 12/10/22 1619)   insulin lispro (HUMALOG) injection vial 0-8 Units (2 Units SubCUTAneous Given 12/10/22 1808)   insulin lispro (HUMALOG) injection vial 0-4 Units (0 Units SubCUTAneous Held 12/10/22 2052)   cefTRIAXone (ROCEPHIN) 1,000 mg in sodium chloride 0.9 % 50 mL IVPB mini-bag (0 mg IntraVENous Stopped 12/10/22 2057)   lactated ringers bolus (0 mLs IntraVENous Stopped 12/9/22 2121)   cefTRIAXone (ROCEPHIN) 1,000 mg in sodium chloride 0.9 % 50 mL IVPB mini-bag (0 mg IntraVENous Stopped 12/9/22 2120)   morphine sulfate (PF) injection 4 mg (4 mg IntraVENous Given 12/9/22 1920)       Current Discharge Medication List           Davian Shields is a 78 y.o. female who presents to the Emergency Department with chief complaint of    Chief Complaint   Patient presents with    Fatigue      61-year-old female presents emerged department via private vehicle with chief complaint of fatigue. History is provided by son at bedside stating that his mom has just had generalized fatigue and weakness over the past 3 days. She has had really no other systemic symptoms. There is been no reported fevers, vomiting, chest pain, shortness of breath, back pain, abdominal pain or urinary symptoms. There is been no known sick contacts. Patient has an allergy to codeine and sulfa. All other systems reviewed and are negative unless otherwise stated in the history of present illness section. Review of Systems   Constitutional:  Positive for fatigue. Negative for activity change, chills and fever. HENT:  Negative for dental problem, drooling, facial swelling, sore throat, trouble swallowing and voice change. Eyes:  Negative for pain. Respiratory:  Negative for cough, chest tightness and shortness of breath. Cardiovascular:  Negative for chest pain and palpitations. Gastrointestinal:  Negative for abdominal pain, nausea and vomiting. Endocrine: Negative for polydipsia. Genitourinary:  Negative for difficulty urinating, dysuria and hematuria. Musculoskeletal:  Negative for back pain and neck pain. Skin:  Negative for rash and wound. Neurological:  Positive for weakness. Negative for dizziness, seizures, facial asymmetry, speech difficulty, numbness and headaches. Psychiatric/Behavioral:  Negative for agitation and behavioral problems.       Past Medical History:   Diagnosis Date    Acute cholecystitis 11/6/2018    Acute pyelonephritis 1/14/2017    CAD (coronary artery disease)     Cerebrovascular accident ([facility] [address] 4/10/2019    COVID-19 virus infection 6/29/2020    Diabetes ([facility] [address]     Hypertension     Other ill-defined conditions(799.89)     choelsterol    Stroke [facility])         Past Surgical History:   Procedure Laterality Date    [place] CARDIAC SURG PROCEDURE UNLIST      stented - doesn't know date        Family History   Problem Relation Age of Onset    Diabetes Father     Hypertension Father     High Cholesterol Father     Diabetes Mother     Heart Disease Mother     Hypertension Mother     High Cholesterol Mother         Social History     Socioeconomic History    Marital status: Legally    Tobacco Use    Smoking status: Never    Smokeless tobacco: Never   Substance and Sexual Activity    Alcohol use: Never    Drug use: Never        Allergies: Codeine and Sulfa antibiotics    Current Discharge Medication List        CONTINUE these medications which have NOT CHANGED    Details   potassium chloride (KLOR-CON M) 20 MEQ extended release tablet Take 1 tablet by mouth 2 times daily for 5 days  Qty: 10 tablet, Refills: 0      !! tamsulosin (FLOMAX) 0.4 MG capsule Take 1 capsule by mouth every evening  Qty: 90 capsule, Refills: 3      !! tamsulosin (FLOMAX) 0.4 MG capsule Take 1 capsule by mouth every evening  Qty: 30 capsule, Refills: 11      acetaminophen (TYLENOL) 500 MG tablet Take 500 mg by mouth every 6 hours as needed      amLODIPine (NORVASC) 5 MG tablet Take 5 mg by mouth daily      aspirin 81 MG EC tablet Take 81 mg by mouth 2 times daily      !! atorvastatin (LIPITOR) 40 MG tablet atorvastatin 40 mg tablet   Take 1 tablet every day by oral route. !! atorvastatin (LIPITOR) 80 MG tablet atorvastatin 80 mg tablet   Take 1 tablet every day by oral route for 90 days. carvedilol (COREG) 6.25 MG tablet carvedilol 6.25 mg tablet   Take 1 tablet twice a day by oral route for 90 days.       vitamin D (CHOLECALCIFEROL) 25 MCG (1000 UT) TABS tablet Take 1,000 Units by mouth daily      clopidogrel (PLAVIX) 75 MG tablet clopidogrel 75 mg tablet   TAKE 1 TABLET DAILY. hydroCHLOROthiazide (HYDRODIURIL) 25 MG tablet hydrochlorothiazide 25 mg tablet   Take 1 tablet every day by oral route for 30 days. HYDROcodone homatropine (HYCODAN) 5-1.5 MG/5ML solution Take 5 mLs by mouth 4 times daily as needed. insulin glargine (LANTUS) 100 UNIT/ML injection vial Inject 13 Units into the skin      loratadine (CLARITIN) 10 MG tablet loratadine 10 mg tablet   Take 1 tablet every day by oral route. losartan (COZAAR) 50 mg tablet losartan 50 mg tablet      metFORMIN (GLUCOPHAGE) 1000 MG tablet Take 1,000 mg by mouth 2 times daily (with meals)      ondansetron (ZOFRAN-ODT) 4 MG disintegrating tablet Take 4 mg by mouth 3 times daily as needed      SITagliptin (JANUVIA) 100 MG tablet Januvia 100 mg tablet   Take 1 tablet every day by oral route for 90 days. !! - Potential duplicate medications found. Please discuss with provider. Vitals signs and nursing note reviewed. Patient Vitals for the past 4 hrs:   Temp Pulse Resp BP SpO2   12/10/22 1945 98.2 °F (36.8 °C) 68 16 119/66 100 %          Physical Exam  Vitals and nursing note reviewed. Constitutional:       General: She is not in acute distress. Appearance: Normal appearance. She is obese. She is not ill-appearing. HENT:      Head: Normocephalic and atraumatic. Right Ear: Tympanic membrane normal.      Left Ear: Tympanic membrane normal.      Mouth/Throat:      Mouth: Mucous membranes are moist.      Pharynx: Oropharynx is clear. Eyes:      Extraocular Movements: Extraocular movements intact. Pupils: Pupils are equal, round, and reactive to light. Cardiovascular:      Rate and Rhythm: Normal rate and regular rhythm. Heart sounds: No murmur heard. Pulmonary:      Effort: No respiratory distress. Breath sounds: No wheezing or rhonchi. Abdominal:      Palpations: Abdomen is soft. There is no mass. Tenderness: There is no abdominal tenderness. There is no guarding. Musculoskeletal:         General: No swelling or tenderness. Cervical back: Normal range of motion and neck supple. No rigidity or tenderness. Skin:     General: Skin is warm and dry. Capillary Refill: Capillary refill takes less than 2 seconds. Neurological:      General: No focal deficit present. Mental Status: She is alert and oriented to person, place, and time. Mental status is at baseline. Psychiatric:         Mood and Affect: Mood normal.         Behavior: Behavior normal.        Procedures    ED EKG Interpretation  EKG was interpreted in the absence of a cardiologist.    Normal sinus rhythm with a ventricular rate of 74 bpm, parable 164, QRS 90, QTc 442, right axis deviation, T wave inversion in aVL and lead II change from prior EKG    Results for orders placed or performed during the hospital encounter of 12/09/22   COVID-19, Rapid    Specimen: Nasopharyngeal   Result Value Ref Range    Source Nasopharyngeal      SARS-CoV-2, Rapid Not detected NOTD     Rapid influenza A/B antigens    Specimen: Nasal Washing   Result Value Ref Range    Influenza A Ag Negative NEG      Influenza B Ag Negative NEG      Source Nasopharyngeal     Culture, Urine    Specimen: Urine   Result Value Ref Range    Special Requests NO SPECIAL REQUESTS  Reflexed from [id number]        Culture        No growth after short period of incubation. Further results to follow after overnight incubation.    Culture, Blood 1    Specimen: Blood   Result Value Ref Range    Special Requests RIGHT  HAND        Culture NO GROWTH AFTER 9 HOURS     Culture, Blood 1    Specimen: Blood   Result Value Ref Range    Special Requests LEFT  Antecubital        Culture NO GROWTH AFTER 10 HOURS     XR CHEST (2 VW)    Narrative    Two view chest    History: Fatigue x3 days    Comparison: 06/18/2022    Findings: There is a relative shallow inspiratory result. The lateral projection  is suboptimal due to shallow inspiration and body habitus. The heart is normal  in size and configuration. The lungs and pleural spaces are grossly clear. The  pulmonary vascularity is within normal limits. The visualized osseous structures  are unremarkable. Impression    Limited exam with grossly clear lungs. CT ABDOMEN PELVIS RENAL STONE    Narrative    CT ABDOMEN AND PELVIS    INDICATION:  Renal insufficiency and hematuria    Multiple axial images were obtained through the abdomen and pelvis without  intravenous or oral contrast.  Radiation dose reduction techniques were used for  this study: All CT scans performed at this facility use one or all of the  following: Automated exposure control, adjustment of the mA and/or kVp according  to patient's size, iterative reconstruction. COMPARISON: 06/19/2022    FINDINGS:  - KIDNEYS/URETERS: There is stable right hydroureteronephrosis. Small  nonobstructing stone is present in the left renal pelvis. Both ureters are  dilated to the bladder.  - BLADDER: There are stable diffuse bladder wall thickening and pericystic  edema. No definite bladder mass. - LUNG BASES: No infiltrates or masses. - LIVER: Normal in size and appearance. - GALLBLADDER/BILE DUCTS: Gallbladder is absent. No bile duct dilatation.  - PANCREAS: Normal.  - SPLEEN: Normal.  - ADRENALS: Normal.    - REPRODUCTIVE ORGANS: No pelvic masses. - BOWEL: Normal caliber. No inflammatory changes. Appendix is normal in size.  - LYMPH NODES: No significant retroperitoneal, mesenteric, or pelvic adenopathy.  - BONES: No fracture or significant bone lesion.  - VASCULATURE: Moderate atherosclerosis.  - OTHER: No ascites. Impression    Persistent bladder wall thickening suggesting cystitis. Associated  bilateral hydronephrosis.       ** If there are any questions about this report, I can be reached on  [employer] or at [phone] **   CBC with Auto Differential   Result Value Ref Range    WBC 10.1 4.3 - 11.1 K/uL    RBC 2.98 (L) 4.05 - 5.2 M/uL    Hemoglobin 8.3 (L) 11.7 - 15.4 g/dL    Hematocrit 26.0 (L) 35.8 - 46.3 %    MCV 87.2 82.0 - 102.0 FL    MCH 27.9 26.1 - 32.9 PG    MCHC 31.9 31.4 - 35.0 g/dL    RDW 14.6 11.9 - 14.6 %    Platelets 419 284 - 626 K/uL    MPV 8.6 (L) 9.4 - 12.3 FL    nRBC 0.00 0.0 - 0.2 K/uL    Seg Neutrophils 40 (L) 43 - 78 %    Lymphocytes 43 13 - 44 %    Monocytes 13 (H) 4.0 - 12.0 %    Eosinophils % 3 0.5 - 7.8 %    Basophils 1 0.0 - 2.0 %    Immature Granulocytes 0 0.0 - 5.0 %    Segs Absolute 4.0 1.7 - 8.2 K/UL    Absolute Lymph # 4.4 0.5 - 4.6 K/UL    Absolute Mono # 1.3 0.1 - 1.3 K/UL    Absolute Eos # 0.3 0.0 - 0.8 K/UL    Basophils Absolute 0.1 0.0 - 0.2 K/UL    Absolute Immature Granulocyte 0.0 0.0 - 0.5 K/UL    RBC Comment SLIGHT  POLYCHROMASIA        RBC Comment SLIGHT  ANISOCYTOSIS        WBC Comment Result Confirmed By Smear      Platelet Comment ADEQUATE      Differential Type AUTOMATED     Comprehensive Metabolic Panel   Result Value Ref Range    Sodium 134 133 - 143 mmol/L    Potassium 4.8 3.5 - 5.1 mmol/L    Chloride 101 101 - 110 mmol/L    CO2 25 21 - 32 mmol/L    Anion Gap 8 2 - 11 mmol/L    Glucose 255 (H) 65 - 100 mg/dL    BUN 45 (H) 8 - 23 MG/DL    Creatinine 2.13 (H) 0.6 - 1.0 MG/DL    Est, Glom Filt Rate 24 (L) >60 ml/min/1.73m2    Calcium 9.1 8.3 - 10.4 MG/DL    Total Bilirubin 0.5 0.2 - 1.1 MG/DL    ALT 42 12 - 65 U/L    AST 25 15 - 37 U/L    Alk Phosphatase 60 50 - 136 U/L    Total Protein 7.9 6.3 - 8.2 g/dL    Albumin 2.2 (L) 3.2 - 4.6 g/dL    Globulin 5.7 (H) 2.8 - 4.5 g/dL    Albumin/Globulin Ratio 0.4 0.4 - 1.6     Lipase   Result Value Ref Range    Lipase 143 73 - 393 U/L   Urinalysis with Reflex to Culture    Specimen: Urine   Result Value Ref Range    Color, UA STRAW      Appearance TURBID      Specific Gravity, UA 1.013 1.001 - 1.023      pH, Urine 8.0 5.0 - 9.0      Protein,  (A) NEG mg/dL    Glucose, UA Negative mg/dL    Ketones, Urine Negative NEG mg/dL    Bilirubin Urine Negative NEG      Blood, Urine LARGE (A) NEG      Urobilinogen, Urine 1.0 0.2 - 1.0 EU/dL    Nitrite, Urine Negative NEG      Leukocyte Esterase, Urine LARGE (A) NEG      WBC, UA >100 0 /hpf    RBC, UA >100 0 /hpf    BACTERIA, URINE 4+ (H) 0 /hpf    Urine Culture if Indicated URINE CULTURE ORDERED      Epithelial Cells UA 0-3 0 /hpf    Casts 5-10 0 /lpf    Crystals 0 0 /LPF    Mucus, UA 0 0 /lpf    Other observations RESULTS VERIFIED MANUALLY     Basic Metabolic Panel w/ Reflex to MG   Result Value Ref Range    Sodium 138 133 - 143 mmol/L    Potassium 4.7 3.5 - 5.1 mmol/L    Chloride 107 101 - 110 mmol/L    CO2 25 21 - 32 mmol/L    Anion Gap 6 2 - 11 mmol/L    Glucose 138 (H) 65 - 100 mg/dL    BUN 41 (H) 8 - 23 MG/DL    Creatinine 1.69 (H) 0.6 - 1.0 MG/DL    Est, Glom Filt Rate 31 (L) >60 ml/min/1.73m2    Calcium 8.4 8.3 - 10.4 MG/DL   CBC with Auto Differential   Result Value Ref Range    WBC 10.2 4.3 - 11.1 K/uL    RBC 2.97 (L) 4.05 - 5.2 M/uL    Hemoglobin 8.2 (L) 11.7 - 15.4 g/dL    Hematocrit 25.8 (L) 35.8 - 46.3 %    MCV 86.9 82.0 - 102.0 FL    MCH 27.6 26.1 - 32.9 PG    MCHC 31.8 31.4 - 35.0 g/dL    RDW 14.7 (H) 11.9 - 14.6 %    Platelets 019 188 - 572 K/uL    MPV 8.7 (L) 9.4 - 12.3 FL    nRBC 0.00 0.0 - 0.2 K/uL    Seg Neutrophils 32 (L) 43 - 78 %    Lymphocytes 51 (H) 13 - 44 %    Monocytes 12 4.0 - 12.0 %    Eosinophils % 4 0.5 - 7.8 %    Basophils 1 0.0 - 2.0 %    Immature Granulocytes 0 0.0 - 5.0 %    Segs Absolute 3.3 1.7 - 8.2 K/UL    Absolute Lymph # 5.2 (H) 0.5 - 4.6 K/UL    Absolute Mono # 1.2 0.1 - 1.3 K/UL    Absolute Eos # 0.4 0.0 - 0.8 K/UL    Basophils Absolute 0.1 0.0 - 0.2 K/UL    Absolute Immature Granulocyte 0.0 0.0 - 0.5 K/UL    RBC Comment MODERATE  ANISOCYTOSIS + POIKILOCYTOSIS        RBC Comment OCCASIONAL  ELISABETH CELLS        RBC Comment OCCASIONAL  SCHISTOCYTES        RBC Comment OCCASIONAL  POLYCHROMASIA        WBC Comment Result Confirmed By Smear      Platelet Comment ADEQUATE      Differential Type AUTOMATED     POCT Glucose   Result Value Ref Range    POC Glucose 174 (H) 65 - 100 mg/dL    Performed by: Clare    POCT Glucose   Result Value Ref Range    POC Glucose 124 (H) 65 - 100 mg/dL    Performed by: Chay    POCT Glucose   Result Value Ref Range    POC Glucose 193 (H) 65 - 100 mg/dL    Performed by: [email or web address]    POCT Glucose   Result Value Ref Range    POC Glucose 219 (H) 65 - 100 mg/dL    Performed by: [email or web address]    POCT Glucose   Result Value Ref Range    POC Glucose 203 (H) 65 - 100 mg/dL    Performed by: Trent         CT ABDOMEN PELVIS RENAL STONE   Final Result   Persistent bladder wall thickening suggesting cystitis. Associated   bilateral hydronephrosis. ** If there are any questions about this report, I can be reached on   [employer] or at [phone] **      XR CHEST (2 VW)   Final Result   Limited exam with grossly clear lungs. Voice dictation software was used during the making of this note. This software is not perfect and grammatical and other typographical errors may be present. This note has not been completely proofread for errors.      Jona Lobo, DO  12/10/22 [id number]

## 2022-12-10 ENCOUNTER — APPOINTMENT (OUTPATIENT)
Dept: CT IMAGING | Age: 77
DRG: 690 | End: 2022-12-10
Payer: MEDICARE

## 2022-12-10 LAB
ANION GAP SERPL CALC-SCNC: 6 MMOL/L (ref 2–11)
BUN SERPL-MCNC: 41 MG/DL (ref 8–23)
CALCIUM SERPL-MCNC: 8.4 MG/DL (ref 8.3–10.4)
CHLORIDE SERPL-SCNC: 107 MMOL/L (ref 101–110)
CO2 SERPL-SCNC: 25 MMOL/L (ref 21–32)
CREAT SERPL-MCNC: 1.69 MG/DL (ref 0.6–1)
GLUCOSE BLD STRIP.AUTO-MCNC: 124 MG/DL (ref 65–100)
GLUCOSE BLD STRIP.AUTO-MCNC: 174 MG/DL (ref 65–100)
GLUCOSE BLD STRIP.AUTO-MCNC: 193 MG/DL (ref 65–100)
GLUCOSE BLD STRIP.AUTO-MCNC: 203 MG/DL (ref 65–100)
GLUCOSE BLD STRIP.AUTO-MCNC: 219 MG/DL (ref 65–100)
GLUCOSE SERPL-MCNC: 138 MG/DL (ref 65–100)
POTASSIUM SERPL-SCNC: 4.7 MMOL/L (ref 3.5–5.1)
SERVICE CMNT-IMP: ABNORMAL
SODIUM SERPL-SCNC: 138 MMOL/L (ref 133–143)

## 2022-12-10 PROCEDURE — 6370000000 HC RX 637 (ALT 250 FOR IP): Performed by: FAMILY MEDICINE

## 2022-12-10 PROCEDURE — 80048 BASIC METABOLIC PNL TOTAL CA: CPT

## 2022-12-10 PROCEDURE — 85025 COMPLETE CBC W/AUTO DIFF WBC: CPT

## 2022-12-10 PROCEDURE — 74176 CT ABD & PELVIS W/O CONTRAST: CPT

## 2022-12-10 PROCEDURE — 6360000002 HC RX W HCPCS: Performed by: FAMILY MEDICINE

## 2022-12-10 PROCEDURE — 2580000003 HC RX 258: Performed by: FAMILY MEDICINE

## 2022-12-10 PROCEDURE — 94760 N-INVAS EAR/PLS OXIMETRY 1: CPT

## 2022-12-10 PROCEDURE — 82962 GLUCOSE BLOOD TEST: CPT

## 2022-12-10 PROCEDURE — 1100000000 HC RM PRIVATE

## 2022-12-10 RX ORDER — ASPIRIN 81 MG/1
81 TABLET ORAL 2 TIMES DAILY
Status: DISCONTINUED | OUTPATIENT
Start: 2022-12-10 | End: 2022-12-12 | Stop reason: HOSPADM

## 2022-12-10 RX ORDER — SODIUM CHLORIDE 9 MG/ML
INJECTION, SOLUTION INTRAVENOUS CONTINUOUS
Status: DISCONTINUED | OUTPATIENT
Start: 2022-12-10 | End: 2022-12-12 | Stop reason: HOSPADM

## 2022-12-10 RX ORDER — AMLODIPINE BESYLATE 5 MG/1
5 TABLET ORAL DAILY
Status: DISCONTINUED | OUTPATIENT
Start: 2022-12-10 | End: 2022-12-12 | Stop reason: HOSPADM

## 2022-12-10 RX ORDER — ATORVASTATIN CALCIUM 40 MG/1
80 TABLET, FILM COATED ORAL NIGHTLY
Status: DISCONTINUED | OUTPATIENT
Start: 2022-12-10 | End: 2022-12-12 | Stop reason: HOSPADM

## 2022-12-10 RX ORDER — SODIUM CHLORIDE 0.9 % (FLUSH) 0.9 %
5-40 SYRINGE (ML) INJECTION EVERY 12 HOURS SCHEDULED
Status: DISCONTINUED | OUTPATIENT
Start: 2022-12-10 | End: 2022-12-12 | Stop reason: HOSPADM

## 2022-12-10 RX ORDER — CARVEDILOL 6.25 MG/1
6.25 TABLET ORAL 2 TIMES DAILY WITH MEALS
Status: DISCONTINUED | OUTPATIENT
Start: 2022-12-10 | End: 2022-12-12 | Stop reason: HOSPADM

## 2022-12-10 RX ORDER — CLOPIDOGREL BISULFATE 75 MG/1
75 TABLET ORAL DAILY
Status: DISCONTINUED | OUTPATIENT
Start: 2022-12-10 | End: 2022-12-12 | Stop reason: HOSPADM

## 2022-12-10 RX ORDER — INSULIN LISPRO 100 [IU]/ML
0-4 INJECTION, SOLUTION INTRAVENOUS; SUBCUTANEOUS NIGHTLY
Status: DISCONTINUED | OUTPATIENT
Start: 2022-12-10 | End: 2022-12-12 | Stop reason: HOSPADM

## 2022-12-10 RX ORDER — INSULIN LISPRO 100 [IU]/ML
0-8 INJECTION, SOLUTION INTRAVENOUS; SUBCUTANEOUS
Status: DISCONTINUED | OUTPATIENT
Start: 2022-12-10 | End: 2022-12-12 | Stop reason: HOSPADM

## 2022-12-10 RX ORDER — ONDANSETRON 2 MG/ML
4 INJECTION INTRAMUSCULAR; INTRAVENOUS EVERY 6 HOURS PRN
Status: DISCONTINUED | OUTPATIENT
Start: 2022-12-10 | End: 2022-12-12 | Stop reason: HOSPADM

## 2022-12-10 RX ORDER — SODIUM CHLORIDE 9 MG/ML
INJECTION, SOLUTION INTRAVENOUS PRN
Status: DISCONTINUED | OUTPATIENT
Start: 2022-12-10 | End: 2022-12-12 | Stop reason: HOSPADM

## 2022-12-10 RX ORDER — SODIUM CHLORIDE 0.9 % (FLUSH) 0.9 %
5-40 SYRINGE (ML) INJECTION PRN
Status: DISCONTINUED | OUTPATIENT
Start: 2022-12-10 | End: 2022-12-12 | Stop reason: HOSPADM

## 2022-12-10 RX ORDER — HYDROCHLOROTHIAZIDE 25 MG/1
25 TABLET ORAL DAILY
Status: DISCONTINUED | OUTPATIENT
Start: 2022-12-10 | End: 2022-12-12 | Stop reason: HOSPADM

## 2022-12-10 RX ORDER — POLYETHYLENE GLYCOL 3350 17 G/17G
17 POWDER, FOR SOLUTION ORAL DAILY PRN
Status: DISCONTINUED | OUTPATIENT
Start: 2022-12-10 | End: 2022-12-12 | Stop reason: HOSPADM

## 2022-12-10 RX ORDER — ACETAMINOPHEN 650 MG/1
650 SUPPOSITORY RECTAL EVERY 6 HOURS PRN
Status: DISCONTINUED | OUTPATIENT
Start: 2022-12-10 | End: 2022-12-12 | Stop reason: HOSPADM

## 2022-12-10 RX ORDER — LOSARTAN POTASSIUM 50 MG/1
50 TABLET ORAL DAILY
Status: DISCONTINUED | OUTPATIENT
Start: 2022-12-10 | End: 2022-12-12 | Stop reason: HOSPADM

## 2022-12-10 RX ORDER — CETIRIZINE HYDROCHLORIDE 10 MG/1
10 TABLET ORAL DAILY
Status: DISCONTINUED | OUTPATIENT
Start: 2022-12-10 | End: 2022-12-12 | Stop reason: HOSPADM

## 2022-12-10 RX ORDER — INSULIN GLARGINE 100 [IU]/ML
13 INJECTION, SOLUTION SUBCUTANEOUS EVERY MORNING
Status: DISCONTINUED | OUTPATIENT
Start: 2022-12-10 | End: 2022-12-12 | Stop reason: HOSPADM

## 2022-12-10 RX ORDER — ONDANSETRON 4 MG/1
4 TABLET, ORALLY DISINTEGRATING ORAL EVERY 8 HOURS PRN
Status: DISCONTINUED | OUTPATIENT
Start: 2022-12-10 | End: 2022-12-12 | Stop reason: HOSPADM

## 2022-12-10 RX ORDER — ENOXAPARIN SODIUM 100 MG/ML
30 INJECTION SUBCUTANEOUS DAILY
Status: DISCONTINUED | OUTPATIENT
Start: 2022-12-10 | End: 2022-12-12 | Stop reason: HOSPADM

## 2022-12-10 RX ORDER — TAMSULOSIN HYDROCHLORIDE 0.4 MG/1
0.4 CAPSULE ORAL EVERY EVENING
Status: DISCONTINUED | OUTPATIENT
Start: 2022-12-10 | End: 2022-12-12 | Stop reason: HOSPADM

## 2022-12-10 RX ORDER — ACETAMINOPHEN 325 MG/1
650 TABLET ORAL EVERY 6 HOURS PRN
Status: DISCONTINUED | OUTPATIENT
Start: 2022-12-10 | End: 2022-12-12 | Stop reason: HOSPADM

## 2022-12-10 RX ADMIN — SODIUM CHLORIDE: 9 INJECTION, SOLUTION INTRAVENOUS at 00:44

## 2022-12-10 RX ADMIN — CARVEDILOL 6.25 MG: 6.25 TABLET, FILM COATED ORAL at 18:15

## 2022-12-10 RX ADMIN — CLOPIDOGREL BISULFATE 75 MG: 75 TABLET ORAL at 08:34

## 2022-12-10 RX ADMIN — ATORVASTATIN CALCIUM 80 MG: 40 TABLET, FILM COATED ORAL at 00:48

## 2022-12-10 RX ADMIN — CARVEDILOL 6.25 MG: 6.25 TABLET, FILM COATED ORAL at 08:12

## 2022-12-10 RX ADMIN — CETIRIZINE HYDROCHLORIDE 10 MG: 10 TABLET, FILM COATED ORAL at 08:34

## 2022-12-10 RX ADMIN — INSULIN LISPRO 2 UNITS: 100 INJECTION, SOLUTION INTRAVENOUS; SUBCUTANEOUS at 18:08

## 2022-12-10 RX ADMIN — ENOXAPARIN SODIUM 30 MG: 100 INJECTION SUBCUTANEOUS at 08:36

## 2022-12-10 RX ADMIN — SODIUM CHLORIDE, PRESERVATIVE FREE 10 ML: 5 INJECTION INTRAVENOUS at 20:26

## 2022-12-10 RX ADMIN — TAMSULOSIN HYDROCHLORIDE 0.4 MG: 0.4 CAPSULE ORAL at 18:15

## 2022-12-10 RX ADMIN — SODIUM CHLORIDE: 9 INJECTION, SOLUTION INTRAVENOUS at 16:19

## 2022-12-10 RX ADMIN — CEFTRIAXONE 1000 MG: 1 INJECTION, POWDER, FOR SOLUTION INTRAMUSCULAR; INTRAVENOUS at 20:27

## 2022-12-10 RX ADMIN — ASPIRIN 81 MG: 81 TABLET, COATED ORAL at 08:34

## 2022-12-10 RX ADMIN — ATORVASTATIN CALCIUM 80 MG: 40 TABLET, FILM COATED ORAL at 20:27

## 2022-12-10 RX ADMIN — INSULIN GLARGINE 13 UNITS: 100 INJECTION, SOLUTION SUBCUTANEOUS at 08:36

## 2022-12-10 RX ADMIN — AMLODIPINE BESYLATE 5 MG: 5 TABLET ORAL at 08:34

## 2022-12-10 RX ADMIN — ASPIRIN 81 MG: 81 TABLET, COATED ORAL at 20:27

## 2022-12-10 RX ADMIN — ASPIRIN 81 MG: 81 TABLET, COATED ORAL at 00:48

## 2022-12-10 RX ADMIN — TAMSULOSIN HYDROCHLORIDE 0.4 MG: 0.4 CAPSULE ORAL at 00:48

## 2022-12-10 ASSESSMENT — PAIN SCALES - GENERAL: PAINLEVEL_OUTOF10: 0

## 2022-12-10 ASSESSMENT — PAIN - FUNCTIONAL ASSESSMENT: PAIN_FUNCTIONAL_ASSESSMENT: NONE - DENIES PAIN

## 2022-12-10 NOTE — H&P
Hospitalist History and Physical   Admit Date:  2022  4:40 PM   Name:  Marcela So   Age:  78 y.o. Sex:  female  :  1945   MRN:  [id number]     Presenting Complaint: general malaise  Reason(s) for Admission: ELFEGO (acute kidney injury) ([facility] [place] [facility] [N17.9]     History of Present Illness:   Marcela So is a 78 y.o. female with medical history of HTN, DM2 who presented to ED with general malaise. Per family, patient has poor PO intake for several days. No overt fevers/chills. No complaint of abdominal pain. Upon ER workup, patient found to have ELFEGO with Cr of 2.13. Baseline Cr appears to be ~1.  WBC is normal.  UA is indicative of infection. Hospitalist asked to admit. Review of Systems:  10 systems reviewed and negative except as noted in HPI. Assessment & Plan:   * ELFEGO (acute kidney injury) ([facility], [place] [facility]  Assessment & Plan  - Cr up to 2.13 today  - Likely due to UTI and resultant poor PO intake  - Holding potentially nephrotoxic medications  - IVFs  - Recheck in AM    Acute cystitis without hematuria  Assessment & Plan  - UA indicative of infection  - Urine culture obtained in ER  - Have added blood cultures  - IV rocephin  - IVFs    Hyperlipidemia  Assessment & Plan  - Continue home statin    Essential hypertension  Assessment & Plan  - BP stable  - Holding losartan and HCTZ for now due to ELFEGO  - Continue other home BP meds  - PRN hydralazine    Coronary artery disease involving native coronary artery of native heart  Assessment & Plan  - Continue home meds  - No report of chest pain at this time      Disposition: inpatient      Past medical history reviewed.     Past Medical History:   Diagnosis Date    Acute cholecystitis 2018    Acute pyelonephritis 2017    CAD (coronary artery disease)     Cerebrovascular accident ([facility] [place] [facility] 4/10/2019    COVID-19 virus infection 2020    Diabetes ([facility] [place] [facility]     Hypertension     Other ill-defined conditions(799.89)     choelsterol    Stroke [facility])      Past surgical history reviewed. Past Surgical History:   Procedure Laterality Date    [facility] CARDIAC SURG PROCEDURE UNLIST      stented - doesn't know date      Allergies   Allergen Reactions    Codeine Nausea And Vomiting    Sulfa Antibiotics Nausea And Vomiting and Hives     Daughter reports but unsure of reaction        Social History     Tobacco Use    Smoking status: Never    Smokeless tobacco: Never   Substance Use Topics    Alcohol use: Never      Family History   Problem Relation Age of Onset    Diabetes Father     Hypertension Father     High Cholesterol Father     Diabetes Mother     Heart Disease Mother     Hypertension Mother     High Cholesterol Mother       Family history reviewed and noncontributory to patient's acute condition; no relevant family history unless otherwise noted above. Immunization History   Administered Date(s) Administered    PPD Test 06/20/2022     PTA Medications:  Current Outpatient Medications   Medication Instructions    acetaminophen (TYLENOL) 500 mg, Oral, EVERY 6 HOURS PRN    amLODIPine (NORVASC) 5 mg, Oral, DAILY    aspirin 81 mg, Oral, 2 TIMES DAILY    atorvastatin (LIPITOR) 40 MG tablet atorvastatin 40 mg tablet   Take 1 tablet every day by oral route. atorvastatin (LIPITOR) 80 MG tablet atorvastatin 80 mg tablet   Take 1 tablet every day by oral route for 90 days. carvedilol (COREG) 6.25 MG tablet carvedilol 6.25 mg tablet   Take 1 tablet twice a day by oral route for 90 days. clopidogrel (PLAVIX) 75 MG tablet clopidogrel 75 mg tablet   TAKE 1 TABLET DAILY. hydroCHLOROthiazide (HYDRODIURIL) 25 MG tablet hydrochlorothiazide 25 mg tablet   Take 1 tablet every day by oral route for 30 days. HYDROcodone homatropine (HYCODAN) 5-1.5 MG/5ML solution 5 mLs, Oral, 4 TIMES DAILY PRN    insulin glargine (LANTUS) 13 Units, SubCUTAneous    loratadine (CLARITIN) 10 MG tablet loratadine 10 mg tablet   Take 1 tablet every day by oral route.     losartan (COZAAR) 50 mg tablet losartan 50 mg tablet    metFORMIN (GLUCOPHAGE) 1,000 mg, Oral, 2 TIMES DAILY WITH MEALS    ondansetron (ZOFRAN-ODT) 4 mg, Oral, 3 TIMES DAILY PRN    potassium chloride (KLOR-CON M) 20 MEQ extended release tablet 20 mEq, Oral, 2 TIMES DAILY    SITagliptin (JANUVIA) 100 MG tablet Januvia 100 mg tablet   Take 1 tablet every day by oral route for 90 days. tamsulosin (FLOMAX) 0.4 mg, Oral, EVERY EVENING    tamsulosin (FLOMAX) 0.4 mg, Oral, EVERY EVENING    vitamin D (CHOLECALCIFEROL) 1,000 Units, Oral, DAILY       Objective:   Patient Vitals for the past 24 hrs:   Temp Pulse Resp BP SpO2   12/09/22 2241 -- 72 11 -- 100 %   12/09/22 2211 -- 80 20 (!) 124/58 99 %   12/09/22 2115 97.6 °F (36.4 °C) 71 12 138/70 99 %   12/09/22 2100 -- 75 14 -- 98 %   12/09/22 2045 -- 75 16 136/76 98 %   12/09/22 2030 -- 70 15 -- 96 %   12/09/22 2015 -- 74 13 -- 97 %   12/09/22 2000 -- 74 15 -- 98 %   12/09/22 1945 -- 75 14 -- 97 %   12/09/22 1938 -- 73 16 (!) 140/59 96 %   12/09/22 1930 -- 74 18 -- 98 %   12/09/22 1920 -- -- -- 128/76 --   12/09/22 1918 -- 80 18 128/73 --   12/09/22 1915 -- 83 14 -- --   12/09/22 1858 -- 97 -- 106/76 --   12/09/22 1845 -- -- 20 (!) 94/52 --   12/09/22 1723 98 °F (36.7 °C) -- 20 -- 100 %   12/09/22 1615 -- -- -- 105/72 --   12/09/22 1613 99.1 °F (37.3 °C) 81 16 -- 98 %          Estimated body mass index is 29.23 kg/m² as calculated from the following:    Height as of this encounter: 5' 3\" (1.6 m). Weight as of this encounter: 165 lb (74.8 kg). No intake or output data in the 24 hours ending 12/09/22 1889      Physical Exam:  General:    Well nourished. No overt distress  Head:  Normocephalic, atraumatic  Eyes:  Sclerae appear normal.  Pupils equally round. HENT:  Nares appear normal, no drainage. Moist mucous membranes  Neck:  No restricted ROM. Trachea midline  CV:   RRR. S1/S2 auscultated  Lungs:   CTAB. No wheezing, rhonchi, or rales. Appears even, unlabored  Abdomen:    Bowel sounds present. Soft, nontender, nondistended. Extremities: Warm and dry. No cyanosis or clubbing. No edema. Skin:     No rashes. Normal turgor. Normal coloration  Neuro:  Cranial nerves II-XII grossly intact. Sensation intact  Psych:  Normal mood and affect.   Alert and oriented x3    Data Ordered and Personally Reviewed:    Last 24hr Labs:  Recent Results (from the past 24 hour(s))   CBC with Auto Differential    Collection Time: 12/09/22  5:02 PM   Result Value Ref Range    WBC 10.1 4.3 - 11.1 K/uL    RBC 2.98 (L) 4.05 - 5.2 M/uL    Hemoglobin 8.3 (L) 11.7 - 15.4 g/dL    Hematocrit 26.0 (L) 35.8 - 46.3 %    MCV 87.2 82.0 - 102.0 FL    MCH 27.9 26.1 - 32.9 PG    MCHC 31.9 31.4 - 35.0 g/dL    RDW 14.6 11.9 - 14.6 %    Platelets 688 519 - 626 K/uL    MPV 8.6 (L) 9.4 - 12.3 FL    nRBC 0.00 0.0 - 0.2 K/uL    Seg Neutrophils 40 (L) 43 - 78 %    Lymphocytes 43 13 - 44 %    Monocytes 13 (H) 4.0 - 12.0 %    Eosinophils % 3 0.5 - 7.8 %    Basophils 1 0.0 - 2.0 %    Immature Granulocytes 0 0.0 - 5.0 %    Segs Absolute 4.0 1.7 - 8.2 K/UL    Absolute Lymph # 4.4 0.5 - 4.6 K/UL    Absolute Mono # 1.3 0.1 - 1.3 K/UL    Absolute Eos # 0.3 0.0 - 0.8 K/UL    Basophils Absolute 0.1 0.0 - 0.2 K/UL    Absolute Immature Granulocyte 0.0 0.0 - 0.5 K/UL    RBC Comment SLIGHT  POLYCHROMASIA        RBC Comment SLIGHT  ANISOCYTOSIS        WBC Comment Result Confirmed By Smear      Platelet Comment ADEQUATE      Differential Type AUTOMATED     Comprehensive Metabolic Panel    Collection Time: 12/09/22  5:02 PM   Result Value Ref Range    Sodium 134 133 - 143 mmol/L    Potassium 4.8 3.5 - 5.1 mmol/L    Chloride 101 101 - 110 mmol/L    CO2 25 21 - 32 mmol/L    Anion Gap 8 2 - 11 mmol/L    Glucose 255 (H) 65 - 100 mg/dL    BUN 45 (H) 8 - 23 MG/DL    Creatinine 2.13 (H) 0.6 - 1.0 MG/DL    Est, Glom Filt Rate 24 (L) >60 ml/min/1.73m2    Calcium 9.1 8.3 - 10.4 MG/DL    Total Bilirubin 0.5 0.2 - 1.1 MG/DL    ALT 42 12 - 65 U/L    AST 25 15 - 37 U/L    Alk Phosphatase 60 50 - 136 U/L    Total Protein 7.9 6.3 - 8.2 g/dL    Albumin 2.2 (L) 3.2 - 4.6 g/dL    Globulin 5.7 (H) 2.8 - 4.5 g/dL    Albumin/Globulin Ratio 0.4 0.4 - 1.6     Lipase    Collection Time: 12/09/22  5:02 PM   Result Value Ref Range    Lipase 143 73 - 393 U/L   Urinalysis with Reflex to Culture    Collection Time: 12/09/22  5:02 PM    Specimen: Urine   Result Value Ref Range    Color, UA STRAW      Appearance TURBID      Specific Gravity, UA 1.013 1.001 - 1.023      pH, Urine 8.0 5.0 - 9.0      Protein,  (A) NEG mg/dL    Glucose, UA Negative mg/dL    Ketones, Urine Negative NEG mg/dL    Bilirubin Urine Negative NEG      Blood, Urine LARGE (A) NEG      Urobilinogen, Urine 1.0 0.2 - 1.0 EU/dL    Nitrite, Urine Negative NEG      Leukocyte Esterase, Urine LARGE (A) NEG      WBC, UA >100 0 /hpf    RBC, UA >100 0 /hpf    BACTERIA, URINE 4+ (H) 0 /hpf    Urine Culture if Indicated URINE CULTURE ORDERED      Epithelial Cells UA 0-3 0 /hpf    Casts 5-10 0 /lpf    Crystals 0 0 /LPF    Mucus, UA 0 0 /lpf    Other observations RESULTS VERIFIED MANUALLY     COVID-19, Rapid    Collection Time: 12/09/22  5:02 PM    Specimen: Nasopharyngeal   Result Value Ref Range    Source Nasopharyngeal      SARS-CoV-2, Rapid Not detected NOTD     Rapid influenza A/B antigens    Collection Time: 12/09/22  5:02 PM    Specimen: Nasal Washing   Result Value Ref Range    Influenza A Ag Negative NEG      Influenza B Ag Negative NEG      Source Nasopharyngeal         Signed:  Boni Abreu MD

## 2022-12-10 NOTE — ED NOTES
TRANSFER - OUT REPORT:    Verbal report given to [place] on Florencio Elliott  being transferred to  for routine progression of patient care       Report consisted of patient's Situation, Background, Assessment and   Recommendations(SBAR). Information from the following report(s) ED SBAR was reviewed with the receiving nurse. [place] Assessment: Presents to emergency department  because of falls (Syncope, seizure, or loss of consciousness): No, Age > 79: No, Altered Mental Status, Intoxication with alcohol or substance confusion (Disorientation, impaired judgment, poor safety awaremess, or inability to follow instructions): No, Impaired Mobility: Ambulates or transfers with assistive devices or assistance; Unable to ambulate or transer.: No, Nursing Judgement: No  Lines:   Peripheral IV 12/10/22 Left Hand (Active)   Site Assessment Clean, dry & intact 12/10/22 0624   Line Status Flushed;Brisk blood return 12/10/22 0624   Phlebitis Assessment No symptoms 12/10/22 0624   Infiltration Assessment 0 12/10/22 7409        Opportunity for questions and clarification was provided.       Patient transported with:  Registered Nurse           Araceli Ornelas RN  12/10/22 1936

## 2022-12-10 NOTE — ASSESSMENT & PLAN NOTE
- BP stable  - Holding losartan and HCTZ for now due to ELFEGO  - Continue other home BP meds  - PRN hydralazine

## 2022-12-10 NOTE — CARE COORDINATION
78 yr-old F with ELFEGO, cystitis. Lives at home with son and D-I-L. Reports she has assistance at all times at home. Previous admissions in June, declined [facility] services at the time. SW will follow for any needs. 12/10/22 0033   Service Assessment   Patient Orientation Alert and Oriented   Cognition Alert   History Provided By Patient   Primary [place] Family Members   Patient's Healthcare Decision Maker is: Legal Next of Ander 71   PCP Verified by CM Yes   Last Visit to PCP Within last 6 months   Prior Functional Level Assistance with the following:;Bathing;Dressing; Toileting   Current Functional Level Assistance with the following:;Bathing;Dressing; Toileting   Can patient return to prior living arrangement Yes   Ability to make needs known: Good   Family able to assist with home care needs: Yes   Would you like for me to discuss the discharge plan with any other family members/significant others, and if so, who?  No   Financial Resources Other (Comment)   Community Resources Other (Comment)

## 2022-12-10 NOTE — ED NOTES
Pt care report given to Maddie Garcia Full report given and time for questions.      Jalil Weber RN  12/09/22 2892

## 2022-12-10 NOTE — PROGRESS NOTES
TRANSFER - IN REPORT:    Verbal report received from Ramesh Jackson on Ashley Rising  being received from ED for routine progression of patient care      Report consisted of patient's Situation, Background, Assessment and   Recommendations(SBAR). Information from the following report(s) Nurse Handoff Report was reviewed with the receiving nurse. Opportunity for questions and clarification was provided. Assessment completed upon patient's arrival to unit and care assumed.

## 2022-12-10 NOTE — PROGRESS NOTES
Hospitalist Progress Note   Admit Date:  2022  4:40 PM   Name:  Latricia Avila   Age:  78 y.o. Sex:  female  :  1945   MRN:  [id number]   Room:  [facility]/    Presenting Complaint: Fatigue     Reason(s) for Admission: Generalized weakness [R53.1]  ELFEGO (acute kidney injury) ([facility] [address] [N17.9]  Acute cystitis without hematuria [N30.00]  Fatigue, unspecified type [R53.83]     Hospital Course:   Latricia Avila is a 78 y.o. female with medical history of HTN, DM2 who presented to ED with general malaise. Per family, patient has poor PO intake for several days. No overt fevers/chills. No complaint of abdominal pain. Upon ER workup, patient found to have ELFEGO with Cr of 2.13. Baseline Cr appears to be ~1.  WBC is normal.  UA is indicative of infection. Hospitalist asked to admit. Subjective & 24hr Events (12/10/22):   Pt fairly flat affect but reports she is feeling better. RN states that on arrival to floor it looks like there was blood that had soaked into her full brief. No stool. Patient reports seeing blood in her urine a few weeks ago on one occasion.        Assessment & Plan:     # ELFEGO   - improved to 1.6 from 2.1 with IVF and treatment of UTI  - repeat BMP in Am     # Acute cystitis with hematuria  Chronic bilateral hydronephrosis  Assessment & Plan  -- blood and urine cultures pending  - continue Rocephin D2  - admitted in 2022 with ecoli UTI and noted to have bilateral hydro though secondary to OAB  - will request bladder scans and urology consult    # HLP  - Continue home statin     # Essential hypertension  - BP stable  - continue holding HCTZ and ARB in presence of ELFEGO       # Coronary artery disease involving native coronary artery of native heart  - Continue home meds coreg, norvasc, asa/ plavix  - No report of chest pain at this time    # IDDM       - fair control, cont Lantus 13 untis and SSI      Anticipated discharge needs:      Pending, add PT/OT/ PPD in case    Diet:  ADULT DIET; Regular; 4 carb choices (60 gm/meal)  DVT PPx: scd  Code status: Full Code    Hospital Problems:  Principal Problem:    ELFEGO (acute kidney injury) ([address]  Active Problems:    Coronary artery disease involving native coronary artery of native heart    Essential hypertension    Hyperlipidemia    Acute cystitis without hematuria  Resolved Problems:    * No resolved hospital problems. *      Objective:   Patient Vitals for the past 24 hrs:   Temp Pulse Resp BP SpO2   12/10/22 1248 -- 66 -- -- 98 %   12/10/22 0920 -- 64 16 105/68 97 %   12/10/22 0802 98.2 °F (36.8 °C) 73 16 135/63 99 %   12/10/22 0702 -- 62 16 (!) 109/57 100 %   12/10/22 0357 -- 62 10 (!) 111/54 99 %   12/10/22 0305 -- -- 16 -- --   12/10/22 0302 -- 63 -- (!) 122/58 98 %   12/10/22 0000 -- 76 16 (!) 143/75 100 %   12/09/22 2241 -- 72 11 -- 100 %   12/09/22 2211 -- 80 20 (!) 124/58 99 %   12/09/22 2115 97.6 °F (36.4 °C) 71 12 138/70 99 %   12/09/22 2100 -- 75 14 -- 98 %   12/09/22 2045 -- 75 16 136/76 98 %   12/09/22 2030 -- 70 15 -- 96 %   12/09/22 2015 -- 74 13 -- 97 %   12/09/22 2000 -- 74 15 -- 98 %   12/09/22 1945 -- 75 14 -- 97 %   12/09/22 1938 -- 73 16 (!) 140/59 96 %   12/09/22 1930 -- 74 18 -- 98 %   12/09/22 1920 -- -- -- 128/76 --   12/09/22 1918 -- 80 18 128/73 --   12/09/22 1915 -- 83 14 -- --   12/09/22 1858 -- 97 -- 106/76 --   12/09/22 1845 -- -- 20 (!) 94/52 --   12/09/22 1723 98 °F (36.7 °C) -- 20 -- 100 %   12/09/22 1615 -- -- -- 105/72 --   12/09/22 1613 99.1 °F (37.3 °C) 81 16 -- 98 %       Oxygen Therapy  SpO2: 98 %  Pulse via Oximetry: 72 beats per minute  Pulse Oximeter Device Mode: Intermittent  Pulse Oximeter Device Location: Right, Finger  O2 Device: None (Room air)    Estimated body mass index is 29.23 kg/m² as calculated from the following:    Height as of this encounter: 5' 3\" (1.6 m). Weight as of this encounter: 165 lb (74.8 kg).   No intake or output data in the 24 hours ending 12/10/22 1333      Physical Exam:     Blood pressure 105/68, pulse 66, temperature 98.2 °F (36.8 °C), resp. rate 16, height 5' 3\" (1.6 m), weight 165 lb (74.8 kg), SpO2 98 %. General:    Well nourished. Head:  Normocephalic, atraumatic  Eyes:  Sclerae appear normal.  Pupils equally round. ENT:  Nares appear normal, no drainage. Moist oral mucosa  Neck:  No restricted ROM. Trachea midline   CV:   RRR. No m/r/g. No jugular venous distension. Lungs:   CTAB. No wheezing, rhonchi, or rales. Symmetric expansion. Abdomen: Bowel sounds present. Soft, nontender, nondistended. Extremities: No cyanosis or clubbing. No edema  Skin:     No rashes and normal coloration. Warm and dry. Neuro:  CN II-XII grossly intact. Sensation intact. A&Ox3  Psych:  Normal mood and affect.       I have personally reviewed labs and tests showing:  Recent Labs:  Recent Results (from the past 48 hour(s))   CBC with Auto Differential    Collection Time: 12/09/22  5:02 PM   Result Value Ref Range    WBC 10.1 4.3 - 11.1 K/uL    RBC 2.98 (L) 4.05 - 5.2 M/uL    Hemoglobin 8.3 (L) 11.7 - 15.4 g/dL    Hematocrit 26.0 (L) 35.8 - 46.3 %    MCV 87.2 82.0 - 102.0 FL    MCH 27.9 26.1 - 32.9 PG    MCHC 31.9 31.4 - 35.0 g/dL    RDW 14.6 11.9 - 14.6 %    Platelets 571 333 - 367 K/uL    MPV 8.6 (L) 9.4 - 12.3 FL    nRBC 0.00 0.0 - 0.2 K/uL    Seg Neutrophils 40 (L) 43 - 78 %    Lymphocytes 43 13 - 44 %    Monocytes 13 (H) 4.0 - 12.0 %    Eosinophils % 3 0.5 - 7.8 %    Basophils 1 0.0 - 2.0 %    Immature Granulocytes 0 0.0 - 5.0 %    Segs Absolute 4.0 1.7 - 8.2 K/UL    Absolute Lymph # 4.4 0.5 - 4.6 K/UL    Absolute Mono # 1.3 0.1 - 1.3 K/UL    Absolute Eos # 0.3 0.0 - 0.8 K/UL    Basophils Absolute 0.1 0.0 - 0.2 K/UL    Absolute Immature Granulocyte 0.0 0.0 - 0.5 K/UL    RBC Comment SLIGHT  POLYCHROMASIA        RBC Comment SLIGHT  ANISOCYTOSIS        WBC Comment Result Confirmed By Smear      Platelet Comment ADEQUATE      Differential Type AUTOMATED     Comprehensive Metabolic Panel Collection Time: 12/09/22  5:02 PM   Result Value Ref Range    Sodium 134 133 - 143 mmol/L    Potassium 4.8 3.5 - 5.1 mmol/L    Chloride 101 101 - 110 mmol/L    CO2 25 21 - 32 mmol/L    Anion Gap 8 2 - 11 mmol/L    Glucose 255 (H) 65 - 100 mg/dL    BUN 45 (H) 8 - 23 MG/DL    Creatinine 2.13 (H) 0.6 - 1.0 MG/DL    Est, Glom Filt Rate 24 (L) >60 ml/min/1.73m2    Calcium 9.1 8.3 - 10.4 MG/DL    Total Bilirubin 0.5 0.2 - 1.1 MG/DL    ALT 42 12 - 65 U/L    AST 25 15 - 37 U/L    Alk Phosphatase 60 50 - 136 U/L    Total Protein 7.9 6.3 - 8.2 g/dL    Albumin 2.2 (L) 3.2 - 4.6 g/dL    Globulin 5.7 (H) 2.8 - 4.5 g/dL    Albumin/Globulin Ratio 0.4 0.4 - 1.6     Lipase    Collection Time: 12/09/22  5:02 PM   Result Value Ref Range    Lipase 143 73 - 393 U/L   Urinalysis with Reflex to Culture    Collection Time: 12/09/22  5:02 PM    Specimen: Urine   Result Value Ref Range    Color, UA STRAW      Appearance TURBID      Specific Gravity, UA 1.013 1.001 - 1.023      pH, Urine 8.0 5.0 - 9.0      Protein,  (A) NEG mg/dL    Glucose, UA Negative mg/dL    Ketones, Urine Negative NEG mg/dL    Bilirubin Urine Negative NEG      Blood, Urine LARGE (A) NEG      Urobilinogen, Urine 1.0 0.2 - 1.0 EU/dL    Nitrite, Urine Negative NEG      Leukocyte Esterase, Urine LARGE (A) NEG      WBC, UA >100 0 /hpf    RBC, UA >100 0 /hpf    BACTERIA, URINE 4+ (H) 0 /hpf    Urine Culture if Indicated URINE CULTURE ORDERED      Epithelial Cells UA 0-3 0 /hpf    Casts 5-10 0 /lpf    Crystals 0 0 /LPF    Mucus, UA 0 0 /lpf    Other observations RESULTS VERIFIED MANUALLY     COVID-19, Rapid    Collection Time: 12/09/22  5:02 PM    Specimen: Nasopharyngeal   Result Value Ref Range    Source Nasopharyngeal      SARS-CoV-2, Rapid Not detected NOTD     Rapid influenza A/B antigens    Collection Time: 12/09/22  5:02 PM    Specimen: Nasal Washing   Result Value Ref Range    Influenza A Ag Negative NEG      Influenza B Ag Negative NEG      Source Nasopharyngeal     Culture, Urine    Collection Time: 12/09/22  5:02 PM    Specimen: Urine   Result Value Ref Range    Special Requests NO SPECIAL REQUESTS  Reflexed from [id number]        Culture        No growth after short period of incubation. Further results to follow after overnight incubation.    Culture, Blood 1    Collection Time: 12/09/22  8:50 PM    Specimen: Blood   Result Value Ref Range    Special Requests LEFT  Antecubital        Culture NO GROWTH AFTER 10 HOURS     Culture, Blood 1    Collection Time: 12/09/22  9:22 PM    Specimen: Blood   Result Value Ref Range    Special Requests RIGHT  HAND        Culture NO GROWTH AFTER 9 HOURS     POCT Glucose    Collection Time: 12/10/22 12:58 AM   Result Value Ref Range    POC Glucose 174 (H) 65 - 100 mg/dL    Performed by: Clare    CBC with Auto Differential    Collection Time: 12/10/22  5:48 AM   Result Value Ref Range    WBC 10.2 4.3 - 11.1 K/uL    RBC 2.97 (L) 4.05 - 5.2 M/uL    Hemoglobin 8.2 (L) 11.7 - 15.4 g/dL    Hematocrit 25.8 (L) 35.8 - 46.3 %    MCV 86.9 82.0 - 102.0 FL    MCH 27.6 26.1 - 32.9 PG    MCHC 31.8 31.4 - 35.0 g/dL    RDW 14.7 (H) 11.9 - 14.6 %    Platelets 530 125 - 741 K/uL    MPV 8.7 (L) 9.4 - 12.3 FL    nRBC 0.00 0.0 - 0.2 K/uL    Seg Neutrophils 32 (L) 43 - 78 %    Lymphocytes 51 (H) 13 - 44 %    Monocytes 12 4.0 - 12.0 %    Eosinophils % 4 0.5 - 7.8 %    Basophils 1 0.0 - 2.0 %    Immature Granulocytes 0 0.0 - 5.0 %    Segs Absolute 3.3 1.7 - 8.2 K/UL    Absolute Lymph # 5.2 (H) 0.5 - 4.6 K/UL    Absolute Mono # 1.2 0.1 - 1.3 K/UL    Absolute Eos # 0.4 0.0 - 0.8 K/UL    Basophils Absolute 0.1 0.0 - 0.2 K/UL    Absolute Immature Granulocyte 0.0 0.0 - 0.5 K/UL    RBC Comment MODERATE  ANISOCYTOSIS + POIKILOCYTOSIS        RBC Comment OCCASIONAL  ELISABETH CELLS        RBC Comment OCCASIONAL  SCHISTOCYTES        RBC Comment OCCASIONAL  POLYCHROMASIA        WBC Comment Result Confirmed By Smear      Platelet Comment ADEQUATE Differential Type AUTOMATED     Basic Metabolic Panel w/ Reflex to MG    Collection Time: 12/10/22  5:57 AM   Result Value Ref Range    Sodium 138 133 - 143 mmol/L    Potassium 4.7 3.5 - 5.1 mmol/L    Chloride 107 101 - 110 mmol/L    CO2 25 21 - 32 mmol/L    Anion Gap 6 2 - 11 mmol/L    Glucose 138 (H) 65 - 100 mg/dL    BUN 41 (H) 8 - 23 MG/DL    Creatinine 1.69 (H) 0.6 - 1.0 MG/DL    Est, Glom Filt Rate 31 (L) >60 ml/min/1.73m2    Calcium 8.4 8.3 - 10.4 MG/DL   POCT Glucose    Collection Time: 12/10/22  8:15 AM   Result Value Ref Range    POC Glucose 124 (H) 65 - 100 mg/dL    Performed by: Chay    POCT Glucose    Collection Time: 12/10/22 11:37 AM   Result Value Ref Range    POC Glucose 193 (H) 65 - 100 mg/dL    Performed by: Panda        I have personally reviewed imaging studies showing: Other Studies:  XR CHEST (2 VW)   Final Result   Limited exam with grossly clear lungs.                CT ABDOMEN PELVIS RENAL STONE    (Results Pending)       Current Meds:  Current Facility-Administered Medications   Medication Dose Route Frequency    [Held by provider] losartan (COZAAR) tablet 50 mg  50 mg Oral Daily    [Held by provider] hydroCHLOROthiazide (HYDRODIURIL) tablet 25 mg  25 mg Oral Daily    amLODIPine (NORVASC) tablet 5 mg  5 mg Oral Daily    aspirin EC tablet 81 mg  81 mg Oral BID    atorvastatin (LIPITOR) tablet 80 mg  80 mg Oral Nightly    carvedilol (COREG) tablet 6.25 mg  6.25 mg Oral BID WC    clopidogrel (PLAVIX) tablet 75 mg  75 mg Oral Daily    insulin glargine (LANTUS) injection vial 13 Units  13 Units SubCUTAneous QAM    tamsulosin (FLOMAX) capsule 0.4 mg  0.4 mg Oral QPM    cetirizine (ZYRTEC) tablet 10 mg  10 mg Oral Daily    sodium chloride flush 0.9 % injection 5-40 mL  5-40 mL IntraVENous 2 times per day    sodium chloride flush 0.9 % injection 5-40 mL  5-40 mL IntraVENous PRN    0.9 % sodium chloride infusion   IntraVENous PRN    enoxaparin Sodium (LOVENOX) injection

## 2022-12-10 NOTE — FLOWSHEET NOTE
12/10/22 0930   Dual Clinician Skin Assessment   Dual Skin Assessment (4 Eyes) WDL   Second Clinical  (First and Last Name) Arlette Six

## 2022-12-10 NOTE — ASSESSMENT & PLAN NOTE
- UA indicative of infection  - Urine culture obtained in ER  - Have added blood cultures  - IV rocephin  - IVFs

## 2022-12-10 NOTE — ASSESSMENT & PLAN NOTE
- Cr up to 2.13 today  - Likely due to UTI and resultant poor PO intake  - Holding potentially nephrotoxic medications  - IVFs  - Recheck in AM

## 2022-12-11 LAB
ANION GAP SERPL CALC-SCNC: 4 MMOL/L (ref 2–11)
BASOPHILS # BLD: 0.1 K/UL (ref 0–0.2)
BASOPHILS # BLD: 0.1 K/UL (ref 0–0.2)
BASOPHILS NFR BLD: 1 % (ref 0–2)
BASOPHILS NFR BLD: 1 % (ref 0–2)
BUN SERPL-MCNC: 41 MG/DL (ref 8–23)
CALCIUM SERPL-MCNC: 8.3 MG/DL (ref 8.3–10.4)
CHLORIDE SERPL-SCNC: 109 MMOL/L (ref 101–110)
CO2 SERPL-SCNC: 24 MMOL/L (ref 21–32)
CREAT SERPL-MCNC: 1.47 MG/DL (ref 0.6–1)
DIFFERENTIAL METHOD BLD: ABNORMAL
DIFFERENTIAL METHOD BLD: ABNORMAL
EOSINOPHIL # BLD: 0.4 K/UL (ref 0–0.8)
EOSINOPHIL # BLD: 0.4 K/UL (ref 0–0.8)
EOSINOPHIL NFR BLD: 4 % (ref 0.5–7.8)
EOSINOPHIL NFR BLD: 4 % (ref 0.5–7.8)
ERYTHROCYTE [DISTWIDTH] IN BLOOD BY AUTOMATED COUNT: 14.7 % (ref 11.9–14.6)
ERYTHROCYTE [DISTWIDTH] IN BLOOD BY AUTOMATED COUNT: 14.7 % (ref 11.9–14.6)
FERRITIN SERPL-MCNC: 138 NG/ML (ref 8–388)
GLUCOSE BLD STRIP.AUTO-MCNC: 180 MG/DL (ref 65–100)
GLUCOSE BLD STRIP.AUTO-MCNC: 207 MG/DL (ref 65–100)
GLUCOSE BLD STRIP.AUTO-MCNC: 224 MG/DL (ref 65–100)
GLUCOSE BLD STRIP.AUTO-MCNC: 227 MG/DL (ref 65–100)
GLUCOSE SERPL-MCNC: 199 MG/DL (ref 65–100)
HCT VFR BLD AUTO: 23.9 % (ref 35.8–46.3)
HCT VFR BLD AUTO: 25 % (ref 35.8–46.3)
HCT VFR BLD AUTO: 25.8 % (ref 35.8–46.3)
HGB BLD-MCNC: 7.6 G/DL (ref 11.7–15.4)
HGB BLD-MCNC: 7.9 G/DL (ref 11.7–15.4)
HGB BLD-MCNC: 8.2 G/DL (ref 11.7–15.4)
IMM GRANULOCYTES # BLD AUTO: 0 K/UL (ref 0–0.5)
IMM GRANULOCYTES # BLD AUTO: 0 K/UL (ref 0–0.5)
IMM GRANULOCYTES NFR BLD AUTO: 0 % (ref 0–5)
IMM GRANULOCYTES NFR BLD AUTO: 0 % (ref 0–5)
IRON SATN MFR SERPL: 18 %
IRON SERPL-MCNC: 31 UG/DL (ref 35–150)
LYMPHOCYTES # BLD: 4.7 K/UL (ref 0.5–4.6)
LYMPHOCYTES # BLD: 5.2 K/UL (ref 0.5–4.6)
LYMPHOCYTES NFR BLD: 51 % (ref 13–44)
LYMPHOCYTES NFR BLD: 52 % (ref 13–44)
MCH RBC QN AUTO: 27.6 PG (ref 26.1–32.9)
MCH RBC QN AUTO: 27.6 PG (ref 26.1–32.9)
MCHC RBC AUTO-ENTMCNC: 31.6 G/DL (ref 31.4–35)
MCHC RBC AUTO-ENTMCNC: 31.8 G/DL (ref 31.4–35)
MCV RBC AUTO: 86.9 FL (ref 82–102)
MCV RBC AUTO: 87.4 FL (ref 82–102)
MONOCYTES # BLD: 1.1 K/UL (ref 0.1–1.3)
MONOCYTES # BLD: 1.2 K/UL (ref 0.1–1.3)
MONOCYTES NFR BLD: 12 % (ref 4–12)
MONOCYTES NFR BLD: 12 % (ref 4–12)
NEUTS SEG # BLD: 2.8 K/UL (ref 1.7–8.2)
NEUTS SEG # BLD: 3.3 K/UL (ref 1.7–8.2)
NEUTS SEG NFR BLD: 31 % (ref 43–78)
NEUTS SEG NFR BLD: 32 % (ref 43–78)
NRBC # BLD: 0 K/UL (ref 0–0.2)
NRBC # BLD: 0 K/UL (ref 0–0.2)
PLATELET # BLD AUTO: 321 K/UL (ref 150–450)
PLATELET # BLD AUTO: 350 K/UL (ref 150–450)
PLATELET COMMENT: ADEQUATE
PLATELET COMMENT: ADEQUATE
PMV BLD AUTO: 8.7 FL (ref 9.4–12.3)
PMV BLD AUTO: 8.8 FL (ref 9.4–12.3)
POTASSIUM SERPL-SCNC: 4.6 MMOL/L (ref 3.5–5.1)
RBC # BLD AUTO: 2.86 M/UL (ref 4.05–5.2)
RBC # BLD AUTO: 2.97 M/UL (ref 4.05–5.2)
RBC MORPH BLD: ABNORMAL
SERVICE CMNT-IMP: ABNORMAL
SODIUM SERPL-SCNC: 137 MMOL/L (ref 133–143)
TIBC SERPL-MCNC: 169 UG/DL (ref 250–450)
WBC # BLD AUTO: 10.2 K/UL (ref 4.3–11.1)
WBC # BLD AUTO: 9.1 K/UL (ref 4.3–11.1)
WBC MORPH BLD: ABNORMAL
WBC MORPH BLD: ABNORMAL

## 2022-12-11 PROCEDURE — 85025 COMPLETE CBC W/AUTO DIFF WBC: CPT

## 2022-12-11 PROCEDURE — 36415 COLL VENOUS BLD VENIPUNCTURE: CPT

## 2022-12-11 PROCEDURE — 6370000000 HC RX 637 (ALT 250 FOR IP): Performed by: FAMILY MEDICINE

## 2022-12-11 PROCEDURE — 99222 1ST HOSP IP/OBS MODERATE 55: CPT | Performed by: UROLOGY

## 2022-12-11 PROCEDURE — 2580000003 HC RX 258: Performed by: FAMILY MEDICINE

## 2022-12-11 PROCEDURE — 82962 GLUCOSE BLOOD TEST: CPT

## 2022-12-11 PROCEDURE — 83550 IRON BINDING TEST: CPT

## 2022-12-11 PROCEDURE — 80048 BASIC METABOLIC PNL TOTAL CA: CPT

## 2022-12-11 PROCEDURE — 2580000003 HC RX 258: Performed by: INTERNAL MEDICINE

## 2022-12-11 PROCEDURE — 85014 HEMATOCRIT: CPT

## 2022-12-11 PROCEDURE — 6360000002 HC RX W HCPCS: Performed by: FAMILY MEDICINE

## 2022-12-11 PROCEDURE — 1100000000 HC RM PRIVATE

## 2022-12-11 PROCEDURE — 82728 ASSAY OF FERRITIN: CPT

## 2022-12-11 PROCEDURE — 2500000003 HC RX 250 WO HCPCS: Performed by: INTERNAL MEDICINE

## 2022-12-11 PROCEDURE — 6360000002 HC RX W HCPCS: Performed by: INTERNAL MEDICINE

## 2022-12-11 RX ADMIN — CLOPIDOGREL BISULFATE 75 MG: 75 TABLET ORAL at 09:22

## 2022-12-11 RX ADMIN — ONDANSETRON 4 MG: 4 TABLET, ORALLY DISINTEGRATING ORAL at 21:54

## 2022-12-11 RX ADMIN — SODIUM CHLORIDE: 9 INJECTION, SOLUTION INTRAVENOUS at 09:31

## 2022-12-11 RX ADMIN — AMLODIPINE BESYLATE 5 MG: 5 TABLET ORAL at 09:21

## 2022-12-11 RX ADMIN — TAMSULOSIN HYDROCHLORIDE 0.4 MG: 0.4 CAPSULE ORAL at 17:04

## 2022-12-11 RX ADMIN — CEFTRIAXONE 1000 MG: 1 INJECTION, POWDER, FOR SOLUTION INTRAMUSCULAR; INTRAVENOUS at 21:53

## 2022-12-11 RX ADMIN — SODIUM CHLORIDE 125 MG: 9 INJECTION, SOLUTION INTRAVENOUS at 17:00

## 2022-12-11 RX ADMIN — ASPIRIN 81 MG: 81 TABLET, COATED ORAL at 21:54

## 2022-12-11 RX ADMIN — CARVEDILOL 6.25 MG: 6.25 TABLET, FILM COATED ORAL at 09:21

## 2022-12-11 RX ADMIN — CETIRIZINE HYDROCHLORIDE 10 MG: 10 TABLET, FILM COATED ORAL at 09:22

## 2022-12-11 RX ADMIN — ASPIRIN 81 MG: 81 TABLET, COATED ORAL at 09:21

## 2022-12-11 RX ADMIN — TUBERCULIN PURIFIED PROTEIN DERIVATIVE 5 UNITS: 5 INJECTION, SOLUTION INTRADERMAL at 09:26

## 2022-12-11 RX ADMIN — INSULIN GLARGINE 13 UNITS: 100 INJECTION, SOLUTION SUBCUTANEOUS at 09:24

## 2022-12-11 RX ADMIN — ATORVASTATIN CALCIUM 80 MG: 40 TABLET, FILM COATED ORAL at 21:54

## 2022-12-11 RX ADMIN — INSULIN LISPRO 2 UNITS: 100 INJECTION, SOLUTION INTRAVENOUS; SUBCUTANEOUS at 17:11

## 2022-12-11 RX ADMIN — CARVEDILOL 6.25 MG: 6.25 TABLET, FILM COATED ORAL at 17:04

## 2022-12-11 RX ADMIN — INSULIN LISPRO 2 UNITS: 100 INJECTION, SOLUTION INTRAVENOUS; SUBCUTANEOUS at 11:50

## 2022-12-11 RX ADMIN — SODIUM CHLORIDE: 9 INJECTION, SOLUTION INTRAVENOUS at 01:43

## 2022-12-11 NOTE — PLAN OF CARE
Shift assessment complete. Patient is alert and oriented, in bed. Respirations are even and unlabored. Bowel sounds are present. Brief changed, patient had urinated but did not have bowel movement. Put pure wick in place to keep patient dry throughout the night. Placed Allevyn on backside. IVF/antbiotic infusing per order. No needs expressed at this time. Bed low and locked, call light within reach. Family at bedside.    _____________________________________      Problem: Safety - Adult  Goal: Free from fall injury  Outcome: Progressing

## 2022-12-11 NOTE — PROGRESS NOTES
Hospitalist Progress Note   Admit Date:  2022  4:40 PM   Name:  Annabelle Ward   Age:  78 y.o. Sex:  female  :  1945   MRN:  [id number]   Room:  [facility]/    Presenting Complaint: Fatigue     Reason(s) for Admission: Generalized weakness [R53.1]  ELFEGO (acute kidney injury) ([address] [N17.9]  Acute cystitis without hematuria [N30.00]  Fatigue, unspecified type [R53.83]     Hospital Course:   Annabelle Ward is a 78 y.o. female with medical history of HTN, DM2 who presented to ED with general malaise. Per family, patient has poor PO intake for several days. No overt fevers/chills. No complaint of abdominal pain. Upon ER workup, patient found to have ELFEGO with Cr of 2.13. Baseline Cr appears to be ~1.  WBC is normal.  UA is indicative of infection. Hospitalist asked to admit. Subjective & 24hr Events (22): Good spirits. Denies pain, nausea, vomiting. Says she is sleepy today. Has not worked with PT yet. Assessment & Plan:     # ELFEGO   -  - Cr down trending, 1.47 today.    Reduce IVF and consider stopping fluids in the AM     # Acute cystitis with hematuria  Chronic bilateral hydronephrosis  -- blood and urine cultures pending  - continue Rocephin D3  - admitted in 2022 with ecoli UTI and noted to have bilateral hydro though secondary to OAB  - bladder scans - pending, have notified RN  - continue flomax as taken at home  - urology consult pending    # HLP  - Continue home statin     # Essential hypertension         - BP stable  - continue holding HCTZ and ARB in presence of ELFEGO     # Coronary artery disease involving native coronary artery of native heart  - Continue home meds coreg, norvasc, asa/ plavix  - No report of chest pain at this time    # IDDM       - fair control, cont Lantus 13 units and SSI    # SHAUN       - suspect secondary to hematuria       - will add ferlicit infusion today       - repeat cbc in AM       - follow-up occult stool, pending      Anticipated discharge needs:      Pending, add PT/OT/ PPD in case    Diet:  ADULT DIET; Regular; 4 carb choices (60 gm/meal)  DVT PPx: scd  Code status: Full Code    Hospital Problems:  Principal Problem:    ELFEGO (acute kidney injury) ([facility] [address]  Active Problems:    Coronary artery disease involving native coronary artery of native heart    Essential hypertension    Hyperlipidemia    Acute cystitis without hematuria  Resolved Problems:    * No resolved hospital problems. *      Objective:   Patient Vitals for the past 24 hrs:   Temp Pulse Resp BP SpO2   12/11/22 1120 98.2 °F (36.8 °C) 66 18 125/62 100 %   12/11/22 0758 98.1 °F (36.7 °C) 85 14 (!) 119/57 98 %   12/11/22 0345 98 °F (36.7 °C) 69 16 123/74 99 %   12/10/22 2345 98.1 °F (36.7 °C) 70 18 125/76 100 %   12/10/22 1945 98.2 °F (36.8 °C) 68 16 119/66 100 %   12/10/22 1527 98 °F (36.7 °C) 80 17 111/68 97 %         Oxygen Therapy  SpO2: 100 %  Pulse via Oximetry: 72 beats per minute  Pulse Oximeter Device Mode: Intermittent  Pulse Oximeter Device Location: Right, Finger  O2 Device: None (Room air)    Estimated body mass index is 29.23 kg/m² as calculated from the following:    Height as of this encounter: 5' 3\" (1.6 m). Weight as of this encounter: 165 lb (74.8 kg). No intake or output data in the 24 hours ending 12/11/22 1435      Physical Exam:     Blood pressure 125/62, pulse 66, temperature 98.2 °F (36.8 °C), resp. rate 18, height 5' 3\" (1.6 m), weight 165 lb (74.8 kg), SpO2 100 %. General:    Well nourished. Head:  Normocephalic, atraumatic  Eyes:  Sclerae appear normal.  Pupils equally round. ENT:  Nares appear normal, no drainage. Moist oral mucosa  Neck:  No restricted ROM. Trachea midline   CV:   RRR. No m/r/g. No jugular venous distension. Lungs:   CTAB. No wheezing, rhonchi, or rales. Symmetric expansion. Abdomen: Bowel sounds present. Soft, nontender, nondistended. Extremities: No cyanosis or clubbing.   No edema  Skin:     No rashes and normal coloration. Warm and dry. Neuro:  CN II-XII grossly intact. Sensation intact. A&Ox3  Psych:  Normal mood and affect.       I have personally reviewed labs and tests showing:  Recent Labs:  Recent Results (from the past 48 hour(s))   CBC with Auto Differential    Collection Time: 12/09/22  5:02 PM   Result Value Ref Range    WBC 10.1 4.3 - 11.1 K/uL    RBC 2.98 (L) 4.05 - 5.2 M/uL    Hemoglobin 8.3 (L) 11.7 - 15.4 g/dL    Hematocrit 26.0 (L) 35.8 - 46.3 %    MCV 87.2 82.0 - 102.0 FL    MCH 27.9 26.1 - 32.9 PG    MCHC 31.9 31.4 - 35.0 g/dL    RDW 14.6 11.9 - 14.6 %    Platelets 708 772 - 836 K/uL    MPV 8.6 (L) 9.4 - 12.3 FL    nRBC 0.00 0.0 - 0.2 K/uL    Seg Neutrophils 40 (L) 43 - 78 %    Lymphocytes 43 13 - 44 %    Monocytes 13 (H) 4.0 - 12.0 %    Eosinophils % 3 0.5 - 7.8 %    Basophils 1 0.0 - 2.0 %    Immature Granulocytes 0 0.0 - 5.0 %    Segs Absolute 4.0 1.7 - 8.2 K/UL    Absolute Lymph # 4.4 0.5 - 4.6 K/UL    Absolute Mono # 1.3 0.1 - 1.3 K/UL    Absolute Eos # 0.3 0.0 - 0.8 K/UL    Basophils Absolute 0.1 0.0 - 0.2 K/UL    Absolute Immature Granulocyte 0.0 0.0 - 0.5 K/UL    RBC Comment SLIGHT  POLYCHROMASIA        RBC Comment SLIGHT  ANISOCYTOSIS        WBC Comment Result Confirmed By Smear      Platelet Comment ADEQUATE      Differential Type AUTOMATED     Comprehensive Metabolic Panel    Collection Time: 12/09/22  5:02 PM   Result Value Ref Range    Sodium 134 133 - 143 mmol/L    Potassium 4.8 3.5 - 5.1 mmol/L    Chloride 101 101 - 110 mmol/L    CO2 25 21 - 32 mmol/L    Anion Gap 8 2 - 11 mmol/L    Glucose 255 (H) 65 - 100 mg/dL    BUN 45 (H) 8 - 23 MG/DL    Creatinine 2.13 (H) 0.6 - 1.0 MG/DL    Est, Glom Filt Rate 24 (L) >60 ml/min/1.73m2    Calcium 9.1 8.3 - 10.4 MG/DL    Total Bilirubin 0.5 0.2 - 1.1 MG/DL    ALT 42 12 - 65 U/L    AST 25 15 - 37 U/L    Alk Phosphatase 60 50 - 136 U/L    Total Protein 7.9 6.3 - 8.2 g/dL    Albumin 2.2 (L) 3.2 - 4.6 g/dL    Globulin 5.7 (H) 2.8 - 4.5 g/dL Albumin/Globulin Ratio 0.4 0.4 - 1.6     Lipase    Collection Time: 12/09/22  5:02 PM   Result Value Ref Range    Lipase 143 73 - 393 U/L   Urinalysis with Reflex to Culture    Collection Time: 12/09/22  5:02 PM    Specimen: Urine   Result Value Ref Range    Color, UA STRAW      Appearance TURBID      Specific Gravity, UA 1.013 1.001 - 1.023      pH, Urine 8.0 5.0 - 9.0      Protein,  (A) NEG mg/dL    Glucose, UA Negative mg/dL    Ketones, Urine Negative NEG mg/dL    Bilirubin Urine Negative NEG      Blood, Urine LARGE (A) NEG      Urobilinogen, Urine 1.0 0.2 - 1.0 EU/dL    Nitrite, Urine Negative NEG      Leukocyte Esterase, Urine LARGE (A) NEG      WBC, UA >100 0 /hpf    RBC, UA >100 0 /hpf    BACTERIA, URINE 4+ (H) 0 /hpf    Urine Culture if Indicated URINE CULTURE ORDERED      Epithelial Cells UA 0-3 0 /hpf    Casts 5-10 0 /lpf    Crystals 0 0 /LPF    Mucus, UA 0 0 /lpf    Other observations RESULTS VERIFIED MANUALLY     COVID-19, Rapid    Collection Time: 12/09/22  5:02 PM    Specimen: Nasopharyngeal   Result Value Ref Range    Source Nasopharyngeal      SARS-CoV-2, Rapid Not detected NOTD     Rapid influenza A/B antigens    Collection Time: 12/09/22  5:02 PM    Specimen: Nasal Washing   Result Value Ref Range    Influenza A Ag Negative NEG      Influenza B Ag Negative NEG      Source Nasopharyngeal     Culture, Urine    Collection Time: 12/09/22  5:02 PM    Specimen: Urine   Result Value Ref Range    Special Requests NO SPECIAL REQUESTS  Reflexed from [id number]        Culture        50,000-100,000 COLONIES/mL MIXED SKIN MARQUIS ISOLATED   Culture, Blood 1    Collection Time: 12/09/22  8:50 PM    Specimen: Blood   Result Value Ref Range    Special Requests LEFT  Antecubital        Culture NO GROWTH AFTER 10 HOURS     Culture, Blood 1    Collection Time: 12/09/22  9:22 PM    Specimen: Blood   Result Value Ref Range    Special Requests RIGHT  HAND        Culture NO GROWTH AFTER 9 HOURS     POCT Glucose Collection Time: 12/10/22 12:58 AM   Result Value Ref Range    POC Glucose 174 (H) 65 - 100 mg/dL    Performed by: Clare    CBC with Auto Differential    Collection Time: 12/10/22  5:48 AM   Result Value Ref Range    WBC 10.2 4.3 - 11.1 K/uL    RBC 2.97 (L) 4.05 - 5.2 M/uL    Hemoglobin 8.2 (L) 11.7 - 15.4 g/dL    Hematocrit 25.8 (L) 35.8 - 46.3 %    MCV 86.9 82.0 - 102.0 FL    MCH 27.6 26.1 - 32.9 PG    MCHC 31.8 31.4 - 35.0 g/dL    RDW 14.7 (H) 11.9 - 14.6 %    Platelets 582 638 - 581 K/uL    MPV 8.7 (L) 9.4 - 12.3 FL    nRBC 0.00 0.0 - 0.2 K/uL    Seg Neutrophils 32 (L) 43 - 78 %    Lymphocytes 51 (H) 13 - 44 %    Monocytes 12 4.0 - 12.0 %    Eosinophils % 4 0.5 - 7.8 %    Basophils 1 0.0 - 2.0 %    Immature Granulocytes 0 0.0 - 5.0 %    Segs Absolute 3.3 1.7 - 8.2 K/UL    Absolute Lymph # 5.2 (H) 0.5 - 4.6 K/UL    Absolute Mono # 1.2 0.1 - 1.3 K/UL    Absolute Eos # 0.4 0.0 - 0.8 K/UL    Basophils Absolute 0.1 0.0 - 0.2 K/UL    Absolute Immature Granulocyte 0.0 0.0 - 0.5 K/UL    RBC Comment MODERATE  ANISOCYTOSIS + POIKILOCYTOSIS        RBC Comment OCCASIONAL  ELISABETH CELLS        RBC Comment OCCASIONAL  SCHISTOCYTES        RBC Comment OCCASIONAL  POLYCHROMASIA        WBC Comment Result Confirmed By Smear      Platelet Comment ADEQUATE      Differential Type AUTOMATED     Basic Metabolic Panel w/ Reflex to MG    Collection Time: 12/10/22  5:57 AM   Result Value Ref Range    Sodium 138 133 - 143 mmol/L    Potassium 4.7 3.5 - 5.1 mmol/L    Chloride 107 101 - 110 mmol/L    CO2 25 21 - 32 mmol/L    Anion Gap 6 2 - 11 mmol/L    Glucose 138 (H) 65 - 100 mg/dL    BUN 41 (H) 8 - 23 MG/DL    Creatinine 1.69 (H) 0.6 - 1.0 MG/DL    Est, Glom Filt Rate 31 (L) >60 ml/min/1.73m2    Calcium 8.4 8.3 - 10.4 MG/DL   POCT Glucose    Collection Time: 12/10/22  8:15 AM   Result Value Ref Range    POC Glucose 124 (H) 65 - 100 mg/dL    Performed by: Chay    POCT Glucose    Collection Time: 12/10/22 11:37 AM Result Value Ref Range    POC Glucose 193 (H) 65 - 100 mg/dL    Performed by: ElenatiaPCT    POCT Glucose    Collection Time: 12/10/22  5:45 PM   Result Value Ref Range    POC Glucose 219 (H) 65 - 100 mg/dL    Performed by: YungondSyntiaPCT    POCT Glucose    Collection Time: 12/10/22  8:40 PM   Result Value Ref Range    POC Glucose 203 (H) 65 - 100 mg/dL    Performed by: Trent    Basic Metabolic Panel w/ Reflex to MG    Collection Time: 12/11/22  2:44 AM   Result Value Ref Range    Sodium 137 133 - 143 mmol/L    Potassium 4.6 3.5 - 5.1 mmol/L    Chloride 109 101 - 110 mmol/L    CO2 24 21 - 32 mmol/L    Anion Gap 4 2 - 11 mmol/L    Glucose 199 (H) 65 - 100 mg/dL    BUN 41 (H) 8 - 23 MG/DL    Creatinine 1.47 (H) 0.6 - 1.0 MG/DL    Est, Glom Filt Rate 37 (L) >60 ml/min/1.73m2    Calcium 8.3 8.3 - 10.4 MG/DL   CBC with Auto Differential    Collection Time: 12/11/22  2:44 AM   Result Value Ref Range    WBC 9.1 4.3 - 11.1 K/uL    RBC 2.86 (L) 4.05 - 5.2 M/uL    Hemoglobin 7.9 (L) 11.7 - 15.4 g/dL    Hematocrit 25.0 (L) 35.8 - 46.3 %    MCV 87.4 82.0 - 102.0 FL    MCH 27.6 26.1 - 32.9 PG    MCHC 31.6 31.4 - 35.0 g/dL    RDW 14.7 (H) 11.9 - 14.6 %    Platelets 762 789 - 931 K/uL    MPV 8.8 (L) 9.4 - 12.3 FL    nRBC 0.00 0.0 - 0.2 K/uL    Seg Neutrophils 31 (L) 43 - 78 %    Lymphocytes 52 (H) 13 - 44 %    Monocytes 12 4.0 - 12.0 %    Eosinophils % 4 0.5 - 7.8 %    Basophils 1 0.0 - 2.0 %    Immature Granulocytes 0 0.0 - 5.0 %    Segs Absolute 2.8 1.7 - 8.2 K/UL    Absolute Lymph # 4.7 (H) 0.5 - 4.6 K/UL    Absolute Mono # 1.1 0.1 - 1.3 K/UL    Absolute Eos # 0.4 0.0 - 0.8 K/UL    Basophils Absolute 0.1 0.0 - 0.2 K/UL    Absolute Immature Granulocyte 0.0 0.0 - 0.5 K/UL    RBC Comment OCCASIONAL  ELISABETH CELLS        RBC Comment OCCASIONAL  SCHISTOCYTES        RBC Comment OCCASIONAL  POLYCHROMASIA        WBC Comment MODERATE      Platelet Comment ADEQUATE      Differential Type AUTOMATED     POCT Glucose Collection Time: 12/11/22  6:39 AM   Result Value Ref Range    POC Glucose 180 (H) 65 - 100 mg/dL    Performed by: Rahel    Ferritin    Collection Time: 12/11/22  8:53 AM   Result Value Ref Range    Ferritin 138 8 - 388 NG/ML   Transferrin Saturation    Collection Time: 12/11/22  8:53 AM   Result Value Ref Range    Iron 31 (L) 35 - 150 ug/dL    TIBC 169 (L) 250 - 450 ug/dL    TRANSFERRIN SATURATION 18 %   POCT Glucose    Collection Time: 12/11/22 11:16 AM   Result Value Ref Range    POC Glucose 224 (H) 65 - 100 mg/dL    Performed by: Kymberly    Hemoglobin and Hematocrit    Collection Time: 12/11/22  1:52 PM   Result Value Ref Range    Hemoglobin 7.6 (L) 11.7 - 15.4 g/dL    Hematocrit 23.9 (L) 35.8 - 46.3 %       I have personally reviewed imaging studies showing: Other Studies:  CT ABDOMEN PELVIS RENAL STONE   Final Result   Persistent bladder wall thickening suggesting cystitis. Associated   bilateral hydronephrosis. ** If there are any questions about this report, I can be reached on   [facility] or at [phone] **      XR CHEST (2 VW)   Final Result   Limited exam with grossly clear lungs.                    Current Meds:  Current Facility-Administered Medications   Medication Dose Route Frequency    [Held by provider] losartan (COZAAR) tablet 50 mg  50 mg Oral Daily    [Held by provider] hydroCHLOROthiazide (HYDRODIURIL) tablet 25 mg  25 mg Oral Daily    amLODIPine (NORVASC) tablet 5 mg  5 mg Oral Daily    aspirin EC tablet 81 mg  81 mg Oral BID    atorvastatin (LIPITOR) tablet 80 mg  80 mg Oral Nightly    carvedilol (COREG) tablet 6.25 mg  6.25 mg Oral BID WC    clopidogrel (PLAVIX) tablet 75 mg  75 mg Oral Daily    insulin glargine (LANTUS) injection vial 13 Units  13 Units SubCUTAneous QAM    tamsulosin (FLOMAX) capsule 0.4 mg  0.4 mg Oral QPM    cetirizine (ZYRTEC) tablet 10 mg  10 mg Oral Daily    sodium chloride flush 0.9 % injection 5-40 mL  5-40 mL IntraVENous 2 times per day    sodium chloride flush 0.9 % injection 5-40 mL  5-40 mL IntraVENous PRN    0.9 % sodium chloride infusion   IntraVENous PRN    [Held by provider] enoxaparin Sodium (LOVENOX) injection 30 mg  30 mg SubCUTAneous Daily    ondansetron (ZOFRAN-ODT) disintegrating tablet 4 mg  4 mg Oral Q8H PRN    Or    ondansetron (ZOFRAN) injection 4 mg  4 mg IntraVENous Q6H PRN    polyethylene glycol (GLYCOLAX) packet 17 g  17 g Oral Daily PRN    acetaminophen (TYLENOL) tablet 650 mg  650 mg Oral Q6H PRN    Or    acetaminophen (TYLENOL) suppository 650 mg  650 mg Rectal Q6H PRN    0.9 % sodium chloride infusion   IntraVENous Continuous    insulin lispro (HUMALOG) injection vial 0-8 Units  0-8 Units SubCUTAneous TID WC    insulin lispro (HUMALOG) injection vial 0-4 Units  0-4 Units SubCUTAneous Nightly    cefTRIAXone (ROCEPHIN) 1,000 mg in sodium chloride 0.9 % 50 mL IVPB mini-bag  1,000 mg IntraVENous Q24H    tuberculin injection 5 Units  5 Units IntraDERmal Once       Signed:  Cecille Body, DO    Part of this note may have been written by using a voice dictation software. The note has been proof read but may still contain some grammatical/other typographical errors.

## 2022-12-12 VITALS
BODY MASS INDEX: 29.23 KG/M2 | WEIGHT: 165 LBS | HEART RATE: 73 BPM | DIASTOLIC BLOOD PRESSURE: 48 MMHG | SYSTOLIC BLOOD PRESSURE: 124 MMHG | RESPIRATION RATE: 17 BRPM | OXYGEN SATURATION: 99 % | HEIGHT: 63 IN | TEMPERATURE: 98.6 F

## 2022-12-12 LAB
ANION GAP SERPL CALC-SCNC: 5 MMOL/L (ref 2–11)
BACTERIA SPEC CULT: NORMAL
BACTERIA SPEC CULT: NORMAL
BASOPHILS # BLD: 0 K/UL (ref 0–0.2)
BASOPHILS NFR BLD: 0 % (ref 0–2)
BUN SERPL-MCNC: 30 MG/DL (ref 8–23)
CALCIUM SERPL-MCNC: 8.2 MG/DL (ref 8.3–10.4)
CHLORIDE SERPL-SCNC: 115 MMOL/L (ref 101–110)
CO2 SERPL-SCNC: 23 MMOL/L (ref 21–32)
CREAT SERPL-MCNC: 1.23 MG/DL (ref 0.6–1)
DIFFERENTIAL METHOD BLD: ABNORMAL
EOSINOPHIL # BLD: 0.3 K/UL (ref 0–0.8)
EOSINOPHIL NFR BLD: 4 % (ref 0.5–7.8)
ERYTHROCYTE [DISTWIDTH] IN BLOOD BY AUTOMATED COUNT: 14.7 % (ref 11.9–14.6)
GLUCOSE BLD STRIP.AUTO-MCNC: 177 MG/DL (ref 65–100)
GLUCOSE SERPL-MCNC: 197 MG/DL (ref 65–100)
HCT VFR BLD AUTO: 25.9 % (ref 35.8–46.3)
HCT VFR BLD AUTO: 26.2 % (ref 35.8–46.3)
HGB BLD-MCNC: 7.9 G/DL (ref 11.7–15.4)
HGB BLD-MCNC: 8.1 G/DL (ref 11.7–15.4)
IMM GRANULOCYTES # BLD AUTO: 0 K/UL (ref 0–0.5)
IMM GRANULOCYTES NFR BLD AUTO: 0 % (ref 0–5)
LYMPHOCYTES # BLD: 4 K/UL (ref 0.5–4.6)
LYMPHOCYTES NFR BLD: 42 % (ref 13–44)
MCH RBC QN AUTO: 27.2 PG (ref 26.1–32.9)
MCHC RBC AUTO-ENTMCNC: 30.5 G/DL (ref 31.4–35)
MCV RBC AUTO: 89.3 FL (ref 82–102)
MM INDURATION, POC: 0 MM (ref 0–5)
MONOCYTES # BLD: 1 K/UL (ref 0.1–1.3)
MONOCYTES NFR BLD: 10 % (ref 4–12)
NEUTS SEG # BLD: 4.2 K/UL (ref 1.7–8.2)
NEUTS SEG NFR BLD: 44 % (ref 43–78)
NRBC # BLD: 0 K/UL (ref 0–0.2)
PLATELET # BLD AUTO: 317 K/UL (ref 150–450)
PMV BLD AUTO: 8.4 FL (ref 9.4–12.3)
POTASSIUM SERPL-SCNC: 4.6 MMOL/L (ref 3.5–5.1)
PPD, POC: NEGATIVE
RBC # BLD AUTO: 2.9 M/UL (ref 4.05–5.2)
SERVICE CMNT-IMP: ABNORMAL
SERVICE CMNT-IMP: NORMAL
SODIUM SERPL-SCNC: 143 MMOL/L (ref 133–143)
WBC # BLD AUTO: 9.6 K/UL (ref 4.3–11.1)

## 2022-12-12 PROCEDURE — 6370000000 HC RX 637 (ALT 250 FOR IP): Performed by: FAMILY MEDICINE

## 2022-12-12 PROCEDURE — 97530 THERAPEUTIC ACTIVITIES: CPT

## 2022-12-12 PROCEDURE — 2580000003 HC RX 258: Performed by: INTERNAL MEDICINE

## 2022-12-12 PROCEDURE — 85014 HEMATOCRIT: CPT

## 2022-12-12 PROCEDURE — 80048 BASIC METABOLIC PNL TOTAL CA: CPT

## 2022-12-12 PROCEDURE — 36415 COLL VENOUS BLD VENIPUNCTURE: CPT

## 2022-12-12 PROCEDURE — 97535 SELF CARE MNGMENT TRAINING: CPT

## 2022-12-12 PROCEDURE — 97166 OT EVAL MOD COMPLEX 45 MIN: CPT

## 2022-12-12 PROCEDURE — 82962 GLUCOSE BLOOD TEST: CPT

## 2022-12-12 PROCEDURE — 85025 COMPLETE CBC W/AUTO DIFF WBC: CPT

## 2022-12-12 RX ORDER — FERROUS SULFATE 325(65) MG
325 TABLET ORAL 2 TIMES DAILY
Qty: 60 TABLET | Refills: 0 | Status: SHIPPED | OUTPATIENT
Start: 2022-12-12

## 2022-12-12 RX ORDER — CEFPODOXIME PROXETIL 200 MG/1
200 TABLET, FILM COATED ORAL 2 TIMES DAILY
Qty: 20 TABLET | Refills: 0 | Status: SHIPPED | OUTPATIENT
Start: 2022-12-12 | End: 2022-12-22

## 2022-12-12 RX ADMIN — CARVEDILOL 6.25 MG: 6.25 TABLET, FILM COATED ORAL at 08:39

## 2022-12-12 RX ADMIN — INSULIN LISPRO 4 UNITS: 100 INJECTION, SOLUTION INTRAVENOUS; SUBCUTANEOUS at 12:21

## 2022-12-12 RX ADMIN — CLOPIDOGREL BISULFATE 75 MG: 75 TABLET ORAL at 08:43

## 2022-12-12 RX ADMIN — INSULIN GLARGINE 13 UNITS: 100 INJECTION, SOLUTION SUBCUTANEOUS at 08:48

## 2022-12-12 RX ADMIN — CETIRIZINE HYDROCHLORIDE 10 MG: 10 TABLET, FILM COATED ORAL at 08:42

## 2022-12-12 RX ADMIN — SODIUM CHLORIDE: 9 INJECTION, SOLUTION INTRAVENOUS at 08:37

## 2022-12-12 RX ADMIN — ASPIRIN 81 MG: 81 TABLET, COATED ORAL at 08:44

## 2022-12-12 NOTE — DISCHARGE INSTRUCTIONS
Acute Kidney Injury: Care Instructions  Overview     Acute kidney injury (ELFEGO) is a sudden decrease in kidney function. This can happen over a period of hours, days or, in some cases, weeks. ELFEGO used to be called acute renal failure, but kidney failure doesn't always happen with ELFEGO. Common causes of ELFEGO are serious infection, blood loss, and some medicines. When ELFEGO happens, the kidneys have trouble removing waste and excess fluids from the body. The waste and fluids build up and become harmful. Kidney function may return to normal if the cause of ELFEGO is treated quickly. Your chance of a full recovery depends on what caused the problem, how quickly the cause was treated, and what other medical problems you have. You may have a treatment called dialysis. It does the work of healthy kidneys to remove waste and fluids for a short time. Follow-up care is a key part of your treatment and safety. Be sure to make and go to all appointments, and call your doctor if you are having problems. It's also a good idea to know your test results and keep a list of the medicines you take. How can you care for yourself at home? Talk to your doctor about how much fluid you should drink. Eat a balanced diet. Talk to your doctor or a dietitian about what type of diet may be best for you. You may need to limit sodium, potassium, and phosphorus. If you need dialysis, follow the instructions and schedule for dialysis that your doctor gives you. Do not smoke. Smoking can make your condition worse. If you need help quitting, talk to your doctor about stop-smoking programs and medicines. These can increase your chances of quitting for good. Limit alcohol. Review all of your medicines with your doctor. Do not take any medicines, including nonsteroidal anti-inflammatory drugs (NSAIDs), such as ibuprofen (Advil, Motrin) or naproxen (Aleve), unless your doctor says it is safe for you to do so.   Make sure that anyone treating you

## 2022-12-12 NOTE — CONSULTS
[address] Urology Consult Note                                           12/11/22     Patient: Florencio Elliott  MRN: [id number]    Admission Date:  12/9/2022, 2  Admission Diagnosis: Generalized weakness [R53.1]  ELFEGO (acute kidney injury) ([facility] [address] [N17.9]  Acute cystitis without hematuria [N30.00]  Fatigue, unspecified type [R53.83]  Reason for Consult: Bilateral Hydronephrosis    ASSESSMENT: 78 y.o. AA female with recurrent UTI and chronic bilateral hydronephrosis who is admitted to the hospital for another UTI. Urology consulted. PLAN:  -Agree with IV antibiotics, transitioning to PO based on culture  -Follow up urine culture  -No surgical intervention needed at this time. Hydronephrosis is chronic and Cr downtrending. She also is afebrile / not demonstrating signs of acute sepsis. -Recommend outpatient cystoscopy to further evaluate bladder for source of bilateral hydronephrosis/ obstruction but suspect it is due to just chronic bladder wall thickening from UTIs / OAB  -Continue flomax, as she feels that it helps her  -Office will call to schedule follow up outpatient  -Will sign off. Call with questions. __________________________________________________________________________________    HPI:     Florencio Elliott is a 78 y.o. AA female with a PMH of incomplete bladder emptying, recurrent UTIs and chronic bilateral hydronephrosis who follows with our urology clinic. Currently admitted to the hospital for malaise and poor PO intake for several days. Found to have ELFEGO Cr 2.13 which has trended down with IV fluids. Baseline ~ 1.  U/A suspicious for another UTI. She is on flomax to improve bladder emptying and has PVRs in the 150 cc range. She does report urinary incontinence but OAB medication has not been started due to elevated PVRs. CT A/P showed chronic bilateral hydronephrosis down to the level of the bladder. Urology consulted for recommendations. Past Medical History:  Past Medical History:   Diagnosis Date    Acute cholecystitis 11/6/2018    Acute pyelonephritis 1/14/2017    CAD (coronary artery disease)     Cerebrovascular accident ([facility] [address] 4/10/2019    COVID-19 virus infection 6/29/2020    Diabetes ([facility] [address]     Hypertension     Other ill-defined conditions(799.89)     choelsterol    Stroke [facility])        Past Surgical History:  Past Surgical History:   Procedure Laterality Date    IA CARDIAC SURG PROCEDURE UNLIST      stented - doesn't know date       Medications:  No current facility-administered medications on file prior to encounter. Current Outpatient Medications on File Prior to Encounter   Medication Sig Dispense Refill    potassium chloride (KLOR-CON M) 20 MEQ extended release tablet Take 1 tablet by mouth 2 times daily for 5 days 10 tablet 0    tamsulosin (FLOMAX) 0.4 MG capsule Take 1 capsule by mouth every evening 90 capsule 3    tamsulosin (FLOMAX) 0.4 MG capsule Take 1 capsule by mouth every evening 30 capsule 11    acetaminophen (TYLENOL) 500 MG tablet Take 500 mg by mouth every 6 hours as needed      amLODIPine (NORVASC) 5 MG tablet Take 5 mg by mouth daily      aspirin 81 MG EC tablet Take 81 mg by mouth 2 times daily      atorvastatin (LIPITOR) 40 MG tablet atorvastatin 40 mg tablet   Take 1 tablet every day by oral route. atorvastatin (LIPITOR) 80 MG tablet atorvastatin 80 mg tablet   Take 1 tablet every day by oral route for 90 days. carvedilol (COREG) 6.25 MG tablet carvedilol 6.25 mg tablet   Take 1 tablet twice a day by oral route for 90 days. vitamin D (CHOLECALCIFEROL) 25 MCG (1000 UT) TABS tablet Take 1,000 Units by mouth daily      clopidogrel (PLAVIX) 75 MG tablet clopidogrel 75 mg tablet   TAKE 1 TABLET DAILY. hydroCHLOROthiazide (HYDRODIURIL) 25 MG tablet hydrochlorothiazide 25 mg tablet   Take 1 tablet every day by oral route for 30 days.       HYDROcodone homatropine (HYCODAN) 5-1.5 MG/5ML solution Take 5 mLs by mouth 4 times daily as needed. insulin glargine (LANTUS) 100 UNIT/ML injection vial Inject 13 Units into the skin      loratadine (CLARITIN) 10 MG tablet loratadine 10 mg tablet   Take 1 tablet every day by oral route. losartan (COZAAR) 50 mg tablet losartan 50 mg tablet      metFORMIN (GLUCOPHAGE) 1000 MG tablet Take 1,000 mg by mouth 2 times daily (with meals)      ondansetron (ZOFRAN-ODT) 4 MG disintegrating tablet Take 4 mg by mouth 3 times daily as needed      SITagliptin (JANUVIA) 100 MG tablet Januvia 100 mg tablet   Take 1 tablet every day by oral route for 90 days. Allergies:   Allergies   Allergen Reactions    Codeine Nausea And Vomiting    Sulfa Antibiotics Nausea And Vomiting and Hives     Daughter reports but unsure of reaction         Social History:  Social History     Socioeconomic History    Marital status: Legally      Spouse name: Not on file    Number of children: Not on file    Years of education: Not on file    Highest education level: Not on file   Occupational History    Not on file   Tobacco Use    Smoking status: Never    Smokeless tobacco: Never   Substance and Sexual Activity    Alcohol use: Never    Drug use: Never    Sexual activity: Not on file   Other Topics Concern    Not on file   Social History Narrative    Not on file     Social Determinants of Health     Financial Resource Strain: Not on file   Food Insecurity: Not on file   Transportation Needs: Not on file   Physical Activity: Not on file   Stress: Not on file   Social Connections: Not on file   Intimate Partner Violence: Not on file   Housing Stability: Not on file       Family History:  Family History   Problem Relation Age of Onset    Diabetes Father     Hypertension Father     High Cholesterol Father     Diabetes Mother     Heart Disease Mother     Hypertension Mother     High Cholesterol Mother        ROS:  Review of Systems - General ROS: negative for - chills, fatigue, or fever  Respiratory ROS: no cough, shortness of breath, or wheezing  Cardiovascular ROS: no chest pain or dyspnea on exertion  Gastrointestinal ROS: no abdominal pain, change in bowel habits, or black or bloody stools  Genito-Urinary ROS: no dysuria, trouble voiding, or hematuria  Musculoskeletal ROS: negative for - muscular weakness    Vitals:  Vitals:    12/11/22 2020   BP: (!) 145/65   Pulse: 74   Resp: 16   Temp: 98.4 °F (36.9 °C)   SpO2: 99%       Intake/Output:    Intake/Output Summary (Last 24 hours) at 12/11/2022 2101  Last data filed at 12/11/2022 2033  Gross per 24 hour   Intake --   Output 1000 ml   Net -1000 ml        Physical Exam:   BP (!) 145/65   Pulse 74   Temp 98.4 °F (36.9 °C) (Oral)   Resp 16   Ht 5' 3\" (1.6 m)   Wt 165 lb (74.8 kg)   SpO2 99%   BMI 29.23 kg/m²      GENERAL: No acute distress, Awake, Alert, Oriented X 3, Gait normal  HEENT: Trachea midline, No gross visual or auditory impairments  CARDIAC: regular rate and rhythm  CHEST AND LUNG: Easy work of breathing  ABDOMEN: soft, non tender, non-distended, positive bowel sounds, no organomegaly, no palpable masses, no guarding, no rebound tenderness    Lab/Radiology/Other Diagnostic Tests:  Recent Labs     12/09/22  1702 12/10/22  0548 12/10/22  0557 12/11/22  0244 12/11/22  1352   HGB 8.3* 8.2*  --  7.9* 7.6*   HCT 26.0* 25.8*  --  25.0* 23.9*   WBC 10.1 10.2  --  9.1  --      --  138 137  --    K 4.8  --  4.7 4.6  --      --  107 109  --    CO2 25  --  25 24  --    BUN 45*  --  41* 41*  --    ALBUMIN 0.4  --   --   --   --    AST 25  --   --   --   --    ALT 42  --   --   --   --    ALKPHOS 60  --   --   --   --    LIPASE 143  --   --   --   --      CT A/P:     Persistent bladder wall thickening suggesting cystitis. Associated   bilateral hydronephrosis. Gee Cruz M.D.     [facility]  [address] [facility], [address]  Phone: [phone]  Fax: [phone]

## 2022-12-12 NOTE — PROGRESS NOTES
ACUTE OCCUPATIONAL THERAPY GOALS:   (Developed with and agreed upon by patient and/or caregiver.)  1. Patient will perform grooming with SETUP and adaptive equipment as needed. 2. Patient will perform upper body dressing with MIN A and adaptive equipment as needed. 3. Patient will perform lower body dressing with MIN A and adaptive equipment as needed. 4. Patient will perform bathing with MIN A and adaptive equipment as needed. 5. Patient will perform toileting and toilet transfer with MIN A and adaptive equipment as needed. 6. Patient will perform ADL functional mobility and tranfers in room with MIN A and adaptive equipment as needed. 7. Patient/family to demonstrate knowledge of home safety and DME recommendations. Timeframe: 7 visits     OCCUPATIONAL THERAPY Initial Assessment, Daily Note, and AM       OT Visit Days: 1  Acknowledge Orders  Time  OT Charge Capture  Rehab Caseload Tracker      True Del Valle is a 78 y.o. female   PRIMARY DIAGNOSIS: ELFEGO (acute kidney injury) ([facility] [address]  Generalized weakness [R53.1]  ELFEGO (acute kidney injury) ([facility] [address] [N17.9]  Acute cystitis without hematuria [N30.00]  Fatigue, unspecified type [R53.83]       Reason for Referral: Generalized Muscle Weakness (M62.81)  Other lack of cordination (R27.8)  Difficulty in walking, Not elsewhere classified (R26.2)  Inpatient: Payor: MEDICARE / Plan: MEDICARE PART A AND B / Product Type: *No Product type* /     ASSESSMENT:     REHAB RECOMMENDATIONS:   Recommendation to date pending progress:  Setting:  Short-term Rehab vs HH therapy if family can assist at this level    Equipment:    To Be Determined     ASSESSMENT:  Ms. Lenny Phan is admitted for the above diagnoses and presents with overall deficits in strength, functional mobility and ADL performance. At baseline, she lives with family in a home with level entry. She reports assistance with ADLs and assistance with mobility. She has a history of CVA with R side deficits.  Today, she reports having a soiled brief and was able to perform bed mobility with min A. Good sitting balance, but required min-mod A x2 for transfers (bed>BSC & BSC>recliner). She required total A for hygiene while standing at [facility]. Total A to don new socks. Presents limited by increased weakness and overall feeling poor. She was left in recliner chair with all needs met and in reach. Pt is functioning below their baseline and will benefit from skilled OT during hospital stay. Anticipate pt will benefit from STR vs HH pending family's ability to assist upon discharge. Per chart review, pt declined [facility] services last admission in June 2022. Will continue to follow.      [address] [facility] [address] [id number] AM-PAC 6 Clicks Daily Activity Inpatient Short Form:    AM-PAC Daily Activity Inpatient   How much help for putting on and taking off regular lower body clothing?: A Lot  How much help for Bathing?: A Lot  How much help for Toileting?: Total  How much help for putting on and taking off regular upper body clothing?: A Lot  How much help for taking care of personal grooming?: A Little  How much help for eating meals?: A Little  AM-[facility] Inpatient Daily Activity Raw Score: 13  AM-PAC Inpatient ADL T-Scale Score : 32.03  ADL Inpatient CMS 0-100% Score: 63.03  ADL Inpatient CMS G-Code Modifier : CL           SUBJECTIVE:     Ms. Chasity Thompson states, \"I just want to go home\"     Social/Functional Lives With: Son (Daughter-in-law)  Type of Home: House  Home Layout: Two level, Able to Live on Main level with bedroom/bathroom  Home Access: Level entry  Bathroom Equipment: 3-in-1 commode  Home Equipment: tiffanie Mayorga [employer] Help From: Family  ADL Assistance: Needs assistance  Bath:  (sponge bath)  Dressing:  (setup (per chart review in June 2022))  Toileting:  (up to [facility])     OBJECTIVE:     Sarai Calabrese / Amirah Cough / AIRWAY: [employer]    RESTRICTIONS/PRECAUTIONS:  Restrictions/Precautions: Fall Risk    PAIN: VITALS / O2:   Pre Treatment:          Post Treatment: no c/o pain, just weakness and feeling bad       Vitals          Oxygen            GROSS EVALUATION: INTACT IMPAIRED   (See Comments)   UE AROM [] []Old R side deficits from CVA   UE PROM [] []   Strength []  Old R side deficits from CVA, generalized weakness noted throughout     Posture / Balance [] Sitting - Static: Good  Sitting - Dynamic: Good  Standing - Static: Fair  Standing - Dynamic: Fair, -   Sensation []     Coordination [] Right UE, Decreased accuracy, Gross motor impairments, Fine motor impairments     Tone []       Edema []    Activity Tolerance []  Fair to poor     Hand Dominance R [] L []      COGNITION/  PERCEPTION: INTACT IMPAIRED   (See Comments)   Orientation [x]     Vision []     Hearing []     Cognition  []     Perception []       MOBILITY: I Mod I S SBA CGA Min Mod Max Total  NT x2 Comments:   Bed Mobility    Rolling [] [] [] [] [] [] [] [] [] [] []    Supine to Sit [] [] [] [] [] [x] [] [] [] [] []    Scooting [] [] [] [] [] [x] [] [] [] [] []    Sit to Supine [] [] [] [] [] [] [] [] [] [] []    Transfers    Sit to Stand [] [] [] [] [] [x] [] [] [] [] []    Bed to Chair [] [] [] [] [] [] [x] [] [] [] []    Stand to Sit [] [] [] [] [] [x] [] [] [] [] []    Tub/Shower [] [] [] [] [] [] [] [] [] [] []     Toilet [] [] [] [] [] [x] [] [] [] [] [x] Toilet - Technique: Stand step; To left  Equipment Used: Standard bedside commode    [] [] [] [] [] [] [] [] [] [] []    I=Independent, Mod I=Modified Independent, S=Supervision/Setup, SBA=Standby Assistance, CGA=Contact Guard Assistance, Min=Minimal Assistance, Mod=Moderate Assistance, Max=Maximal Assistance, Total=Total Assistance, NT=Not Tested    ACTIVITIES OF DAILY LIVING: I Mod I S SBA CGA Min Mod Max Total NT Comments   BASIC ADLs:              Upper Body Bathing  [] [] [] [] [] [] [] [] [] []    Lower Body Bathing [] [] [] [] [] [] [] [] [] []    Toileting [] [] [] [] [] [] [] [] [x] [] For hygiene after soiled brief   Upper Body Dressing [] [] [] [] [] [] [] [] [] []    Lower Body Dressing [] [] [] [] [] [] [] [] [x] [] For socks   Feeding [] [x] [] [] [] [] [] [] [] []    Grooming [] [] [x] [] [] [] [] [] [] []    Personal Device Care [] [] [] [] [] [] [] [] [] []    Functional Mobility [] [] [] [] [] [x] [] [] [] [] X2 RW   I=Independent, Mod I=Modified Independent, S=Supervision/Setup, SBA=Standby Assistance, CGA=Contact Guard Assistance, Min=Minimal Assistance, Mod=Moderate Assistance, Max=Maximal Assistance, Total=Total Assistance, NT=Not Tested    PLAN:   [address] [place] [address] of Care: 3 times/week for duration of hospital stay or until stated goals are met, whichever comes first.    PROBLEM LIST:   (Skilled intervention is medically necessary to address:)  Decreased ADL/Functional Activities  Decreased Activity Tolerance  Decreased Balance  Decreased Coordination  Decreased Gait Ability  Decreased Safety Awareness  Decreased Strength  Decreased Transfer Abilities   INTERVENTIONS PLANNED:  (Benefits and precautions of occupational therapy have been discussed with the patient.)  Self Care Training  Therapeutic Activity  Therapeutic Exercise/HEP  Neuromuscular Re-education  Manual Therapy  Education         TREATMENT:     EVALUATION: MODERATE COMPLEXITY: (Untimed Charge)    TREATMENT:   Co-Treatment PT/OT necessary due to patient's decreased overall endurance/tolerance levels, as well as need for high level skilled assistance to complete functional transfers/mobility and functional tasks  Self Care (25 minutes): Patient participated in toileting, lower body dressing, personal device care, and grooming ADLs in supported sitting, unsupported sitting, and standing with moderate verbal, manual, and tactile cueing to increase independence, increase activity tolerance, and increase safety awareness.  Patient also participated in functional mobility, functional transfer, and adaptive equipment training to increase independence, increase activity tolerance, and increase safety awareness. TREATMENT GRID:  N/A    AFTER TREATMENT PRECAUTIONS: Bed/Chair Locked, Call light within reach, Chair, Needs within reach, and RN notified    INTERDISCIPLINARY COLLABORATION:  RN/ PCT and PT/ PTA    EDUCATION:  Education Given To: Patient  Education Provided: Role of Therapy;Plan of Care;Precautions; ADL Adaptive Strategies;Transfer Training;Equipment; Fall Prevention Strategies  Education Method: Demonstration;Verbal  Barriers to Learning: None  Education Outcome: Verbalized understanding;Demonstrated understanding;Continued education needed    TOTAL TREATMENT DURATION AND TIME:  Time In: 0915  Time Out: 0945  Minutes: [place], [place]

## 2022-12-12 NOTE — PROGRESS NOTES
ACUTE PHYSICAL THERAPY GOALS:   (Developed with and agreed upon by patient and/or caregiver.)  STG:  (1.)Ms. Krista Pierce will move from supine to sit and sit to supine  with CONTACT GUARD ASSIST within 1-2 treatment day(s). (2.)Ms. Krista Pierce will transfer from bed to chair and chair to bed with CONTACT GUARD ASSIST using the least restrictive device within 1-2 treatment day(s). (3.)Ms. Krista Pierce will ambulate with CONTACT GUARD ASSIST for 15 feet with the least restrictive device within 2-3 treatment day(s). LTG:  (1.)Ms. Krista Pierce will move from supine to sit and sit to supine  in bed with STAND BY ASSIST within 3-10 treatment day(s). (2.)Ms. Krista Pierce will transfer from bed to chair and chair to bed with STAND BY ASSIST using the least restrictive device within 3-10 treatment day(s). (3.)Ms. Krista Pierce will ambulate with STAND BY ASSIST for  feet with the least restrictive device within 3-10 treatment day(s). ________________________________________________________________________________________________      PHYSICAL THERAPY Initial Assessment, Daily Note, and AM  (Link to Caseload Tracking: PT Visit Days : 1  Acknowledge Orders  Time In/Out  PT Charge Capture  Rehab Caseload Tracker    Devante Del Real is a 78 y.o. female   PRIMARY DIAGNOSIS: ELFEGO (acute kidney injury) ([facility] [address]  Generalized weakness [R53.1]  ELFEGO (acute kidney injury) ([facility] [address] [N17.9]  Acute cystitis without hematuria [N30.00]  Fatigue, unspecified type [R53.83]       Reason for Referral: Difficulty in walking, Not elsewhere classified (R26.2)  Other abnormalities of gait and mobility (R26.89)  Inpatient: Payor: MEDICARE / Plan: MEDICARE PART A AND B / Product Type: *No Product type* /     ASSESSMENT:     REHAB RECOMMENDATIONS:   Recommendation to date pending progress:  Setting:  Short-term Rehab    Equipment:    None     ASSESSMENT:  Ms. Krista Pierce is admitted with above diagnosis with decreased independence with functional mobility.  Pt supine on arrival. Pt assisted supine to sit to stand. Pt assisted with steps to bedside commode. Pt assisted with steps to bedside chair. Pt in bedside chair with needs in reach. Pt will benefit from therapy services to maximize independence with functional mobility. [address] [facility] [address] [id number] AM-PAC 6 Clicks Basic Mobility Inpatient Short Form  -PAC Mobility Inpatient   How much difficulty turning over in bed?: A Little  How much difficulty sitting down on / standing up from a chair with arms?: A Little  How much difficulty moving from lying on back to sitting on side of bed?: A Little  How much help from another person moving to and from a bed to a chair?: A Little  How much help from another person needed to walk in hospital room?: A Little  How much help from another person for climbing 3-5 steps with a railing?: A Little  [facility] Inpatient Mobility Raw Score : 18  AM-PAC Inpatient T-Scale Score : 43.63  Mobility Inpatient CMS 0-100% Score: 46.58  Mobility Inpatient CMS G-Code Modifier : CK    SUBJECTIVE:   Ms. Minnie Almeida states she does not feel well. Pt with soiled brief and participated with therapy to get cleaned.     Social/Functional Lives With: Family  Home Equipment: Tonye Roca, rolling    OBJECTIVE:     PAIN: Stephanie Min / O2: PRECAUTION / Izell Blazing / DRAINS:   Pre Treatment:   Pain Assessment: None - Denies Pain      Post Treatment: no complaints Vitals        Oxygen      None    RESTRICTIONS/PRECAUTIONS:                    GROSS EVALUATION: Intact Impaired (Comments):   AROM [x]     PROM []    Strength []  4/5 grossly throughout   Balance [] Sitting - Static: Good  Sitting - Dynamic: Good  Standing - Static: Fair, +  Standing - Dynamic: Fair   Posture [] Rounded Shoulders   Sensation [x]     Coordination [x]      Tone [x]     Edema []    Activity Tolerance [] Patient limited by fatigue    []      COGNITION/  PERCEPTION: Intact Impaired (Comments):   Orientation [x]     Vision [x]     Hearing [x]     Cognition  [x]       MOBILITY: I Mod I S SBA CGA Min Mod Max Total  NT x2 Comments:   Bed Mobility    Rolling [] [] [] [] [] [] [] [] [] [] []    Supine to Sit [] [] [] [] [] [x] [] [] [] [] []    Scooting [] [] [] [] [] [] [] [] [] [] []    Sit to Supine [] [] [] [] [] [] [] [] [] [] []    Transfers    Sit to Stand [] [] [] [] [] [x] [] [] [] [] []    Bed to Chair [] [] [] [] [] [x] [] [] [] [] []    Stand to Sit [] [] [] [] [] [x] [] [] [] [] []     [] [] [] [] [] [] [] [] [] [] []    I=Independent, Mod I=Modified Independent, S=Supervision, SBA=Standby Assistance, CGA=Contact Guard Assistance,   Min=Minimal Assistance, Mod=Moderate Assistance, Max=Maximal Assistance, Total=Total Assistance, NT=Not Tested    GAIT: I Mod I S SBA CGA Min Mod Max Total  NT x2 Comments:   Level of Assistance [] [] [] [] [] [x] [] [] [] [] []    Distance 15 feet    DME Rolling Walker    Gait Quality Decreased step length    Weightbearing Status      Stairs      I=Independent, Mod I=Modified Independent, S=Supervision, SBA=Standby Assistance, CGA=Contact Guard Assistance,   Min=Minimal Assistance, Mod=Moderate Assistance, Max=Maximal Assistance, Total=Total Assistance, NT=Not Tested    PLAN:   FREQUENCY AND DURATION: Daily for duration of hospital stay or until stated goals are met, whichever comes first.    THERAPY PROGNOSIS: Good    PROBLEM LIST:   (Skilled intervention is medically necessary to address:)  Decreased Activity Tolerance  Decreased Balance  Decreased Gait Ability  Decreased Transfer Abilities INTERVENTIONS PLANNED:   (Benefits and precautions of physical therapy have been discussed with the patient.)  Therapeutic Activity  Gait Training  Education       TREATMENT:   EVALUATION: LOW COMPLEXITY: (Untimed Charge)    TREATMENT:   Therapeutic Activity (30 Minutes):  Therapeutic activity included Supine to Sit, Sit to Supine, Transfer Training, Ambulation on level ground, Sitting balance , and Standing balance to improve functional Activity tolerance, Balance, Coordination, Mobility, and Strength.     TREATMENT GRID:  N/A    AFTER TREATMENT PRECAUTIONS: Call light within reach, Chair, and RN notified    INTERDISCIPLINARY COLLABORATION:  RN/ PCT, PT/ PTA, and OT/ PENA    EDUCATION: Education Given To: Patient  Education Provided: Role of Therapy;Plan of Care;Transfer Training  Education Method: Demonstration;Verbal  Education Outcome: Verbalized understanding;Demonstrated understanding    TIME IN/OUT:  Time In: 0915  Time Out: 10 Sue   Minutes: [address] [facility],

## 2022-12-12 NOTE — PROGRESS NOTES
This nurse entered room to find vomit on pt gown and sweater. Pt stated \"I threw up. \" This nurse gave pt a bed bath and changed pt gown. Complete linen change as well. Pt given PRN Zofran and instructed to call if feeling nauseous. Pt verbalized understanding. Bed in lowest position, call light within reach.

## 2022-12-14 LAB
BACTERIA SPEC CULT: NORMAL
BACTERIA SPEC CULT: NORMAL
SERVICE CMNT-IMP: NORMAL
SERVICE CMNT-IMP: NORMAL

## 2022-12-14 NOTE — DISCHARGE SUMMARY
Hospitalist Discharge Summary   Admit Date:  2022  4:40 PM   DC Note date: 2022  Name:  Tracey Villalobos   Age:  78 y.o. Sex:  female  :  1945   MRN:  [id number]   Room:  [facility]  PCP:  Carolyne Michaels DO    Presenting Complaint: Fatigue     Initial Admission Diagnosis: Generalized weakness [R53.1]  ELFEGO (acute kidney injury) ([facility] [address] [N17.9]  Acute cystitis without hematuria [N30.00]  Fatigue, unspecified type [R53.83]     Problem List for this Hospitalization (present on admission):    Principal Problem:    ELFEGO (acute kidney injury) ([facility] [address]  Active Problems:    Coronary artery disease involving native coronary artery of native heart    Essential hypertension    Hyperlipidemia    Acute cystitis without hematuria    Anemia, unspecified  Resolved Problems:    * No resolved hospital problems. *      Hospital Course:  Tracey Villalobos is a 78 y.o. female with medical history of HTN, DM2 who presented to ED with general malaise. Per family, patient has poor PO intake for several days. No overt fevers/chills. No complaint of abdominal pain. Upon ER workup, patient found to have ELFEGO with Cr of 2.13. Baseline Cr appears to be ~1.  WBC is normal.  UA is indicative of infection. Hospitalist asked to admit. # ELFEGO   - resolved with use of IVF. # Acute cystitis with hematuria  Chronic bilateral hydronephrosis  -- urology has seen  - continue flomax  - needs OP cystoscopy  - continue atbx for cystitis - vantin at discharge. - culture pending at time of discharge. To f/u with urology or PCP for report     # HLP  - Continue home statin     # Essential hypertension       - stable off HCTZ and cozaar. Continue to hold at discharge.         - resume if necessary OP     # Coronary artery disease involving native coronary artery of native heart  - Continue home meds coreg, norvasc, asa/ plavix       # IDDM       - fair control, cont Lantus 13 units and SSI     # SHAUN       - suspect secondary to hematuria - s/p IV iron       - start oral iron at discharge      Disposition: home  Diet: No diet orders on file  Code Status: Prior    Follow Ups:   [address], DO. Schedule an appointment as soon as possible for a   visit in 1 week(s). Specialty: Nurse Practitioner  Contact information:  Matteo Worley [address] [facility] [address]  [address], APRN - NP Follow up. Specialty: Nurse Practitioner  Why: Office will contact you with a follow up appointment  Contact information:  Michael  [address] [facility]  [phone]                       Time spent in patient discharge and coordination 32 minutes. Follow up labs/diagnostics (ultimately defer to outpatient provider):  PCP follow up 1 week, Urology 2-3 weeks. Follow-up pending urine culture report. Plan was discussed with patient, family, RN, CM. All questions answered. Patient was stable at time of discharge. Instructions given to call a physician or return if any concerns.     Discharge Medication List as of 12/12/2022 12:36 PM        START taking these medications    Details   cefpodoxime (VANTIN) 200 MG tablet Take 1 tablet by mouth 2 times daily for 10 days, Disp-20 tablet, R-0Normal      ferrous sulfate (IRON 325) 325 (65 Fe) MG tablet Take 1 tablet by mouth 2 times daily, Disp-60 tablet, R-0Normal           CONTINUE these medications which have CHANGED    Details   metFORMIN (GLUCOPHAGE) 1000 MG tablet Take 1 tablet by mouth daily (with breakfast), Disp-60 tablet, R-3Normal           CONTINUE these medications which have NOT CHANGED    Details   potassium chloride (KLOR-CON M) 20 MEQ extended release tablet Take 1 tablet by mouth 2 times daily for 5 days, Disp-10 tablet, R-0Print      tamsulosin (FLOMAX) 0.4 MG capsule Take 1 capsule by mouth every evening, Disp-30 capsule, R-11Normal      acetaminophen (TYLENOL) 500 MG tablet Take 500 mg by mouth every 6 hours as neededHistorical Med      amLODIPine (NORVASC) 5 MG tablet Take 5 mg by mouth dailyHistorical Med      aspirin 81 MG EC tablet Take 81 mg by mouth 2 times dailyHistorical Med      atorvastatin (LIPITOR) 80 MG tablet atorvastatin 80 mg tablet   Take 1 tablet every day by oral route for 90 days. Historical Med      carvedilol (COREG) 6.25 MG tablet carvedilol 6.25 mg tablet   Take 1 tablet twice a day by oral route for 90 days. Historical Med      vitamin D (CHOLECALCIFEROL) 25 MCG (1000 UT) TABS tablet Take 1,000 Units by mouth dailyHistorical Med      clopidogrel (PLAVIX) 75 MG tablet clopidogrel 75 mg tablet   TAKE 1 TABLET DAILY. Historical Med      HYDROcodone homatropine (HYCODAN) 5-1.5 MG/5ML solution Take 5 mLs by mouth 4 times daily as needed. Historical Med      insulin glargine (LANTUS) 100 UNIT/ML injection vial Inject 13 Units into the skinHistorical Med      loratadine (CLARITIN) 10 MG tablet loratadine 10 mg tablet   Take 1 tablet every day by oral route. Historical Med      ondansetron (ZOFRAN-ODT) 4 MG disintegrating tablet Take 4 mg by mouth 3 times daily as neededHistorical Med      SITagliptin (JANUVIA) 100 MG tablet Januvia 100 mg tablet   Take 1 tablet every day by oral route for 90 days. Historical Med           STOP taking these medications       hydroCHLOROthiazide (HYDRODIURIL) 25 MG tablet Comments:   Reason for Stopping:         losartan (COZAAR) 50 mg tablet Comments:   Reason for Stopping:               Procedures done this admission:  * No surgery found *    Consults this admission:  IP CONSULT TO UROLOGY    Echocardiogram results:  No results found for this or any previous visit. Diagnostic Imaging/Tests:   XR CHEST (2 VW)    Result Date: 12/9/2022  Limited exam with grossly clear lungs. CT ABDOMEN PELVIS RENAL STONE    Result Date: 12/10/2022  Persistent bladder wall thickening suggesting cystitis. Associated bilateral hydronephrosis.  ** If there are any questions about this report, I can be reached on [employer] or at [phone] **       Labs: Results:       BMP, Mg, Phos Recent Labs     12/11/22  0244 12/12/22  0337    143   K 4.6 4.6    115*   CO2 24 23   ANIONGAP 4 5   BUN 41* 30*   CREATININE 1.47* 1.23*   LABGLOM 37* 46*   CALCIUM 8.3 8.2*   GLUCOSE 199* 197*      CBC Recent Labs     12/11/22  0244 12/11/22  1352 12/12/22  0337 12/12/22  0941   WBC 9.1  --  9.6  --    RBC 2.86*  --  2.90*  --    HGB 7.9* 7.6* 7.9* 8.1*   HCT 25.0* 23.9* 25.9* 26.2*   MCV 87.4  --  89.3  --    MCH 27.6  --  27.2  --    MCHC 31.6  --  30.5*  --    RDW 14.7*  --  14.7*  --      --  317  --    MPV 8.8*  --  8.4*  --    NRBC 0.00  --  0.00  --    SEGS 31*  --  44  --    LYMPHOPCT 52*  --  42  --    EOSRELPCT 4  --  4  --    MONOPCT 12  --  10  --    BASOPCT 1  --  0  --    IMMGRAN 0  --  0  --    SEGSABS 2.8  --  4.2  --    LYMPHSABS 4.7*  --  4.0  --    EOSABS 0.4  --  0.3  --    MONOSABS 1.1  --  1.0  --    BASOSABS 0.1  --  0.0  --    ABSIMMGRAN 0.0  --  0.0  --       LFT No results for input(s): BILITOT, BILIDIR, ALKPHOS, AST, ALT, PROT, LABALBU, GLOB in the last 72 hours.    Cardiac  No results found for: NTPROBNP, TROPHS   Coags No results found for: PROTIME, INR, APTT   A1c Lab Results   Component Value Date/Time    LABA1C 6.4 06/19/2022 08:17 AM     06/19/2022 08:17 AM      Lipids No results found for: CHOL, LDLCALC, LABVLDL, HDL, CHOLHDLRATIO, TRIG   Thyroid  Lab Results   Component Value Date/Time    TSHELE 1.58 06/19/2022 08:17 AM        Most Recent UA Lab Results   Component Value Date/Time    COLORU STRAW 12/09/2022 05:02 PM    APPEARANCE TURBID 12/09/2022 05:02 PM    SPECGRAV 1.013 12/09/2022 05:02 PM    LABPH 8.0 12/09/2022 05:02 PM    PROTEINU 300 12/09/2022 05:02 PM    GLUCOSEU Negative 12/09/2022 05:02 PM    KETUA Negative 12/09/2022 05:02 PM    BILIRUBINUR Negative 12/09/2022 05:02 PM    BILIRUBINUR Negative 04/07/2022 03:53 PM BLOODU LARGE 12/09/2022 05:02 PM    UROBILINOGEN 1.0 12/09/2022 05:02 PM    NITRU Negative 12/09/2022 05:02 PM    LEUKOCYTESUR LARGE 12/09/2022 05:02 PM    WBCUA >100 12/09/2022 05:02 PM    RBCUA >100 12/09/2022 05:02 PM    EPITHUA 0-3 12/09/2022 05:02 PM    BACTERIA 4+ 12/09/2022 05:02 PM    LABCAST 5-10 12/09/2022 05:02 PM    MUCUS 0 12/09/2022 05:02 PM        Recent Labs     12/09/22 2122 12/09/22 2050 12/09/22  1702   CULTURE NO GROWTH 4 DAYS NO GROWTH 4 DAYS >100,000 COLONIES/mL MIXED SKIN MARQUIS ISOLATED  THREE OR MORE TYPES OF ORGANISMS ARE PRESENT. THIS IS INDICATIVE OF CONTAMINATION DUE TO IMPROPER COLLECTION TECHNIQUE. PLEASE REPEAT COLLECTION UNLESS PATIENT HAS STARTED ANTIBIOTIC TREATMENT. All Labs from Last 24 Hrs:  No results found for this or any previous visit (from the past 24 hour(s)). Allergies   Allergen Reactions    Codeine Nausea And Vomiting    Sulfa Antibiotics Nausea And Vomiting and Hives     Daughter reports but unsure of reaction       Immunization History   Administered Date(s) Administered    PPD Test 06/20/2022, 12/11/2022       Recent Vital Data:  No data found. Oxygen Therapy  SpO2: 99 %  Pulse via Oximetry: 72 beats per minute  Pulse Oximeter Device Mode: Intermittent  Pulse Oximeter Device Location: Right, Finger  O2 Device: None (Room air)    Estimated body mass index is 29.23 kg/m² as calculated from the following:    Height as of this encounter: 5' 3\" (1.6 m). Weight as of this encounter: 165 lb (74.8 kg). No intake or output data in the 24 hours ending 12/13/22 6404      Physical Exam:    General:    Well nourished. No overt distress  Head:  Normocephalic, atraumatic  Eyes:  Sclerae appear normal.  Pupils equally round. HENT:  Nares appear normal, no drainage. Moist mucous membranes  Neck:  No restricted ROM. Trachea midline  CV:   RRR. No m/r/g. No JVD  Lungs:   CTAB. No wheezing, rhonchi, or rales.   Respirations even, unlabored  Abdomen:   Soft, nontender, nondistended. Extremities: Warm and dry. No cyanosis or clubbing. No edema. Skin:     No rashes. Normal coloration  Neuro:  CN II-XII grossly intact. Psych:  Normal mood and affect. Signed:  Johnny Black DO    Part of this note may have been written by using a voice dictation software. The note has been proof read but may still contain some grammatical/other typographical errors.

## 2023-01-09 ENCOUNTER — PROCEDURE VISIT (OUTPATIENT)
Dept: UROLOGY | Age: 78
End: 2023-01-09

## 2023-01-09 DIAGNOSIS — N30.00 ACUTE CYSTITIS WITHOUT HEMATURIA: ICD-10-CM

## 2023-01-09 DIAGNOSIS — N39.0 COMPLICATED UTI (URINARY TRACT INFECTION): ICD-10-CM

## 2023-01-09 DIAGNOSIS — R39.14 FEELING OF INCOMPLETE BLADDER EMPTYING: Primary | ICD-10-CM

## 2023-01-09 RX ORDER — NITROFURANTOIN 25; 75 MG/1; MG/1
100 CAPSULE ORAL 2 TIMES DAILY
Qty: 20 CAPSULE | Refills: 0 | Status: SHIPPED | OUTPATIENT
Start: 2023-01-09 | End: 2023-01-09 | Stop reason: SDUPTHER

## 2023-01-09 RX ORDER — NITROFURANTOIN 25; 75 MG/1; MG/1
100 CAPSULE ORAL 2 TIMES DAILY
Qty: 20 CAPSULE | Refills: 0 | Status: SHIPPED | OUTPATIENT
Start: 2023-01-09 | End: 2023-01-19

## 2023-01-10 NOTE — PROGRESS NOTES
Presented for cysto today. U/A with UTI. Cysto canceled. Sent for culture. Macrobid sent to pharmacy. Will reschedule for next week and try to complete while on the antibiotic. Gee Adamson M.D.     [facility]  [address], [facility] [address] [facility]  Phone: [phone]  Fax: [phone]

## 2023-01-11 LAB
BACTERIA SPEC CULT: NORMAL
BACTERIA SPEC CULT: NORMAL
SERVICE CMNT-IMP: NORMAL

## 2023-01-11 NOTE — RESULT ENCOUNTER NOTE
Urine culture showed no significant growth. Just contamination. Will keep cysto as scheduled with me on 1/16/23. Gee Gleason M.D.     [facility]  [address]  Phone: [phone]  Fax: [phone]

## 2023-01-16 ENCOUNTER — PROCEDURE VISIT (OUTPATIENT)
Dept: UROLOGY | Age: 78
End: 2023-01-16
Payer: MEDICARE

## 2023-01-16 DIAGNOSIS — N39.0 COMPLICATED UTI (URINARY TRACT INFECTION): Primary | ICD-10-CM

## 2023-01-16 LAB
BILIRUBIN, URINE, POC: NEGATIVE
BLOOD URINE, POC: NORMAL
GLUCOSE URINE, POC: NEGATIVE
KETONES, URINE, POC: NEGATIVE
LEUKOCYTE ESTERASE, URINE, POC: NORMAL
NITRITE, URINE, POC: NEGATIVE
PH, URINE, POC: 7 (ref 4.6–8)
PROTEIN,URINE, POC: 100
SPECIFIC GRAVITY, URINE, POC: 1.01 (ref 1–1.03)
URINALYSIS CLARITY, POC: NORMAL
URINALYSIS COLOR, POC: NORMAL
UROBILINOGEN, POC: NORMAL

## 2023-01-16 PROCEDURE — 52000 CYSTOURETHROSCOPY: CPT | Performed by: UROLOGY

## 2023-01-16 PROCEDURE — 81003 URINALYSIS AUTO W/O SCOPE: CPT | Performed by: UROLOGY

## 2023-01-16 PROCEDURE — 1123F ACP DISCUSS/DSCN MKR DOCD: CPT | Performed by: UROLOGY

## 2023-01-16 PROCEDURE — G8427 DOCREV CUR MEDS BY ELIG CLIN: HCPCS | Performed by: UROLOGY

## 2023-01-16 PROCEDURE — 1036F TOBACCO NON-USER: CPT | Performed by: UROLOGY

## 2023-01-16 PROCEDURE — 1090F PRES/ABSN URINE INCON ASSESS: CPT | Performed by: UROLOGY

## 2023-01-16 PROCEDURE — G8400 PT W/DXA NO RESULTS DOC: HCPCS | Performed by: UROLOGY

## 2023-01-16 PROCEDURE — 99214 OFFICE O/P EST MOD 30 MIN: CPT | Performed by: UROLOGY

## 2023-01-16 PROCEDURE — G8484 FLU IMMUNIZE NO ADMIN: HCPCS | Performed by: UROLOGY

## 2023-01-16 PROCEDURE — G8417 CALC BMI ABV UP PARAM F/U: HCPCS | Performed by: UROLOGY

## 2023-01-16 PROCEDURE — 3017F COLORECTAL CA SCREEN DOC REV: CPT | Performed by: UROLOGY

## 2023-01-16 RX ORDER — CEPHALEXIN 250 MG/1
250 CAPSULE ORAL DAILY
Qty: 90 CAPSULE | Refills: 3 | Status: SHIPPED | OUTPATIENT
Start: 2023-01-16

## 2023-01-16 NOTE — PROGRESS NOTES
[facility]  [address]   [address]  [facility], [address] [facility]  [phone]    Anthony Burnett  : 1945         HPI   78 y.o. AA female who presents for cystoscopy for evaluation of recurrent UTIs. Seen in consult 2022. She has a PMH of incomplete bladder emptying, recurrent UTIs and chronic bilateral hydronephrosis. Has had a CVA in the past.      Currently admitted to the hospital for malaise and poor PO intake for several days. Found to have ELFEGO Cr 2.13 which has trended down with IV fluids. Baseline ~ 1. She had UTI last week and has been on antibiotic to clear her urine for cysto today. She is on flomax to improve bladder emptying and has PVRs in the 150 cc range. She does report urinary incontinence but OAB medication has not been started due to elevated PVRs. CT A/P showed chronic bilateral hydronephrosis down to the level of the bladder.      Past Medical History:   Diagnosis Date    Acute cholecystitis 2018    Acute pyelonephritis 2017    CAD (coronary artery disease)     Cerebrovascular accident ([facility] [address] 4/10/2019    COVID-19 virus infection 2020    Diabetes ([facility] [address]     Hypertension     Other ill-defined conditions(799.89)     choelsterol    Stroke [facility])      Past Surgical History:   Procedure Laterality Date    [place] UNLISTED PROCEDURE CARDIAC SURGERY      stented - doesn't know date     Current Outpatient Medications   Medication Sig Dispense Refill    cephALEXin (KEFLEX) 250 MG capsule Take 1 capsule by mouth daily 90 capsule 3    nitrofurantoin, macrocrystal-monohydrate, (MACROBID) 100 MG capsule Take 1 capsule by mouth 2 times daily for 10 days 20 capsule 0    ferrous sulfate (IRON 325) 325 (65 Fe) MG tablet Take 1 tablet by mouth 2 times daily 60 tablet 0    metFORMIN (GLUCOPHAGE) 1000 MG tablet Take 1 tablet by mouth daily (with breakfast) 60 tablet 3    tamsulosin (FLOMAX) 0.4 MG capsule Take 1 capsule by mouth every evening 30 capsule 11 acetaminophen (TYLENOL) 500 MG tablet Take 500 mg by mouth every 6 hours as needed      amLODIPine (NORVASC) 5 MG tablet Take 5 mg by mouth daily      aspirin 81 MG EC tablet Take 81 mg by mouth 2 times daily      atorvastatin (LIPITOR) 80 MG tablet atorvastatin 80 mg tablet   Take 1 tablet every day by oral route for 90 days. carvedilol (COREG) 6.25 MG tablet carvedilol 6.25 mg tablet   Take 1 tablet twice a day by oral route for 90 days. vitamin D (CHOLECALCIFEROL) 25 MCG (1000 UT) TABS tablet Take 1,000 Units by mouth daily      clopidogrel (PLAVIX) 75 MG tablet clopidogrel 75 mg tablet   TAKE 1 TABLET DAILY. HYDROcodone homatropine (HYCODAN) 5-1.5 MG/5ML solution Take 5 mLs by mouth 4 times daily as needed. insulin glargine (LANTUS) 100 UNIT/ML injection vial Inject 13 Units into the skin      loratadine (CLARITIN) 10 MG tablet loratadine 10 mg tablet   Take 1 tablet every day by oral route. ondansetron (ZOFRAN-ODT) 4 MG disintegrating tablet Take 4 mg by mouth 3 times daily as needed      SITagliptin (JANUVIA) 100 MG tablet Januvia 100 mg tablet   Take 1 tablet every day by oral route for 90 days. potassium chloride (KLOR-CON M) 20 MEQ extended release tablet Take 1 tablet by mouth 2 times daily for 5 days 10 tablet 0     No current facility-administered medications for this visit.      Allergies   Allergen Reactions    Codeine Nausea And Vomiting    Sulfa Antibiotics Nausea And Vomiting and Hives     Daughter reports but unsure of reaction      Sulfamethoxazole-Trimethoprim Nausea And Vomiting     Social History     Socioeconomic History    Marital status: Legally      Spouse name: Not on file    Number of children: Not on file    Years of education: Not on file    Highest education level: Not on file   Occupational History    Not on file   Tobacco Use    Smoking status: Never    Smokeless tobacco: Never   Substance and Sexual Activity    Alcohol use: Never    Drug use: Never    Sexual activity: Not on file   Other Topics Concern    Not on file   Social History Narrative    Not on file     Social Determinants of Health     Financial Resource Strain: Not on file   Food Insecurity: Not on file   Transportation Needs: Not on file   Physical Activity: Not on file   Stress: Not on file   Social Connections: Not on file   Intimate Partner Violence: Not on file   Housing Stability: Not on file     Family History   Problem Relation Age of Onset    Diabetes Father     Hypertension Father     High Cholesterol Father     Diabetes Mother     Heart Disease Mother     Hypertension Mother     High Cholesterol Mother        There were no vitals taken for this visit. UA - Dipstick  Results for orders placed or performed in visit on 01/16/23   AMB POC URINALYSIS DIP STICK AUTO W/O MICRO   Result Value Ref Range    Color (UA POC)      Clarity (UA POC)      Glucose, Urine, POC Negative Negative    Bilirubin, Urine, POC Negative Negative    KETONES, Urine, POC Negative Negative    Specific Gravity, Urine, POC 1.015 1.001 - 1.035    Blood (UA POC) Large Negative    pH, Urine, POC 7.0 4.6 - 8.0    Protein, Urine,  Negative    Urobilinogen, POC 0.2 mg/dL     Nitrite, Urine, POC Negative Negative    Leukocyte Esterase, Urine, POC Large Negative       UA - Micro  WBC - 0  RBC - 0  Bacteria - 0  Epith - 0    There were no vitals taken for this visit.      GENERAL: No acute distress, Awake, Alert, Oriented X 3, Gait normal  CARDIAC: regular rate and rhythm  CHEST AND LUNG: Easy work of breathing, clear to auscultation bilaterally, no cyanosis  ABDOMEN: soft, non tender, non-distended, positive bowel sounds, no organomegaly, no palpable masses, no guarding, no rebound tenderness  SKIN: No rash, no erythema, no lacerations or abrasions, no ecchymosis  NEUROLOGIC: cranial nerves 2-12 grossly intact       Cystoscopy Procedure:     Procedure Room  1  Scope ID:       dispos  Assistant:      ac    All risks, benefits and alternatives were again reviewed with patient and she is willing to proceed at this time. Her genital area was prepped and draped and a sterile field applied. 2% lidocaine jelly was injected in the the urethra and allowed to dwell for several minutes. A flexible cystoscope was then inserted into the urethral meatus and advanced under direct vision. The urethra appeared normal with no obstructions. The bladder neck was open without scarring, contractures, frons or tumors present. The bladder was systematically surveyed. +2 bladder trabeculations were seen. No mucosal abnormalities were seen. The ureteral orifices were seen in their normal orthotopic position but much larger than usual consistent with refluxing Uos likely leading to chronic hydronephrosis from neurogenic bladder. The cystoscope was then removed under direct vision. The patient tolerated the procedure well. Assessment and Plan    ICD-10-CM    1. Complicated UTI (urinary tract infection)  N39.0 CYSTOURETHROSCOPY     AMB POC URINALYSIS DIP STICK AUTO W/O MICRO     cephALEXin (KEFLEX) 250 MG capsule     Cytology, Non-Gyn     Cytology, Non-Gyn        Orders Placed This Encounter   Procedures    CYSTOURETHROSCOPY    Cytology, Non-Gyn     Standing Status:   Future     Number of Occurrences:   1     Standing Expiration Date:   1/16/2024     Order Specific Question:   PREVIOUS BIOPSY     Answer:   No     Order Specific Question:   PREOP DIAGNOSIS     Answer:   cysitits     Order Specific Question:   FROZEN SECTION - NO OR YES/SPECIMEN     Answer:   No    AMB POC URINALYSIS DIP STICK AUTO W/O MICRO     Recurrent UTIs:   Likely related to incomplete bladder emptying due to CVA. Continue flomax, timed/double void, daily cranberry. Does not want estrace due to stroke history / clot risk. Will start daily keflex for UTI prevention. Follow up 3 months. Urine cytology sent today to rule out malignancy.  NO obvious abnormality of bladder mucosa on cysto today.     Neurogenic Bladder and Bilateral Hydronephrosis:   Likely due to large Uos that reflux on cysto today from chronic neurogenic bladder. This can also lead to UTIs.  Will monitor on daily suppressive antibiotic and will need repeat upper tract imaging down the road in 6 months or so to ensure hydronephrosis is not worsening.  If it does, then will need a catheter plan and possibly anti-cholinergic or beta 3 agonist or botox to lessen bladder pressures.     Will follow up in 3 months    Patient had a complete established visit today for neurogenic bladder and bilateral hydronephrosis management that is separately identifiable from procedure performed today for recurrent UTI evaluation. .  Complete documentation of this separate visit is present.     I have spent 30 minutes today reviewing previous notes, test results and face to face with the patient as well as documenting.      Gee Monson M.D.    [facility]  [address] [address]  Phone: [phone]  Fax: [phone]

## 2023-01-18 LAB
CYTOLOGY-NON GYN: NORMAL
SPECIMEN SOURCE: NORMAL

## 2023-01-19 NOTE — RESULT ENCOUNTER NOTE
. Christine Lux,     Your urine cytology shows no cancer cells. This is good news. Please keep your follow up as scheduled. Thanks!   Dr. Coon Bras

## 2023-02-07 ENCOUNTER — APPOINTMENT (OUTPATIENT)
Dept: GENERAL RADIOLOGY | Age: 78
End: 2023-02-07
Payer: MEDICARE

## 2023-02-07 LAB
BASOPHILS # BLD: 0 K/UL (ref 0–0.2)
BASOPHILS NFR BLD: 0 % (ref 0–2)
DIFFERENTIAL METHOD BLD: ABNORMAL
EOSINOPHIL # BLD: 0.2 K/UL (ref 0–0.8)
EOSINOPHIL NFR BLD: 2 % (ref 0.5–7.8)
ERYTHROCYTE [DISTWIDTH] IN BLOOD BY AUTOMATED COUNT: 15.7 % (ref 11.9–14.6)
HCT VFR BLD AUTO: 28.9 % (ref 35.8–46.3)
HGB BLD-MCNC: 9.2 G/DL (ref 11.7–15.4)
IMM GRANULOCYTES # BLD AUTO: 0 K/UL (ref 0–0.5)
IMM GRANULOCYTES NFR BLD AUTO: 0 % (ref 0–5)
LYMPHOCYTES # BLD: 3.3 K/UL (ref 0.5–4.6)
LYMPHOCYTES NFR BLD: 34 % (ref 13–44)
MCH RBC QN AUTO: 28.8 PG (ref 26.1–32.9)
MCHC RBC AUTO-ENTMCNC: 31.8 G/DL (ref 31.4–35)
MCV RBC AUTO: 90.3 FL (ref 82–102)
MONOCYTES # BLD: 1 K/UL (ref 0.1–1.3)
MONOCYTES NFR BLD: 11 % (ref 4–12)
NEUTS SEG # BLD: 5.1 K/UL (ref 1.7–8.2)
NEUTS SEG NFR BLD: 53 % (ref 43–78)
NRBC # BLD: 0 K/UL (ref 0–0.2)
PLATELET # BLD AUTO: 346 K/UL (ref 150–450)
PMV BLD AUTO: 8.5 FL (ref 9.4–12.3)
RBC # BLD AUTO: 3.2 M/UL (ref 4.05–5.2)
WBC # BLD AUTO: 9.7 K/UL (ref 4.3–11.1)

## 2023-02-07 PROCEDURE — 80053 COMPREHEN METABOLIC PANEL: CPT

## 2023-02-07 PROCEDURE — 85025 COMPLETE CBC W/AUTO DIFF WBC: CPT

## 2023-02-07 PROCEDURE — 99285 EMERGENCY DEPT VISIT HI MDM: CPT

## 2023-02-08 ENCOUNTER — HOSPITAL ENCOUNTER (EMERGENCY)
Age: 78
Discharge: HOME | End: 2023-02-08
Attending: EMERGENCY MEDICINE
Payer: MEDICARE

## 2023-02-08 ENCOUNTER — APPOINTMENT (OUTPATIENT)
Dept: GENERAL RADIOLOGY | Age: 78
End: 2023-02-08
Payer: MEDICARE

## 2023-02-08 VITALS
RESPIRATION RATE: 16 BRPM | DIASTOLIC BLOOD PRESSURE: 48 MMHG | WEIGHT: 165 LBS | SYSTOLIC BLOOD PRESSURE: 120 MMHG | BODY MASS INDEX: 29.23 KG/M2 | HEART RATE: 59 BPM | TEMPERATURE: 98.9 F | OXYGEN SATURATION: 100 % | HEIGHT: 63 IN

## 2023-02-08 DIAGNOSIS — R11.2 NAUSEA AND VOMITING, UNSPECIFIED VOMITING TYPE: Primary | ICD-10-CM

## 2023-02-08 LAB
ALBUMIN SERPL-MCNC: 2.5 G/DL (ref 3.2–4.6)
ALBUMIN/GLOB SERPL: 0.5 (ref 0.4–1.6)
ALP SERPL-CCNC: 68 U/L (ref 50–136)
ALT SERPL-CCNC: 33 U/L (ref 12–65)
ANION GAP SERPL CALC-SCNC: 3 MMOL/L (ref 2–11)
AST SERPL-CCNC: 22 U/L (ref 15–37)
BILIRUB SERPL-MCNC: 0.6 MG/DL (ref 0.2–1.1)
BUN SERPL-MCNC: 16 MG/DL (ref 8–23)
CALCIUM SERPL-MCNC: 8.6 MG/DL (ref 8.3–10.4)
CHLORIDE SERPL-SCNC: 110 MMOL/L (ref 101–110)
CO2 SERPL-SCNC: 28 MMOL/L (ref 21–32)
CREAT SERPL-MCNC: 1.4 MG/DL (ref 0.6–1)
EKG ATRIAL RATE: 69 BPM
EKG DIAGNOSIS: NORMAL
EKG P AXIS: 26 DEGREES
EKG P-R INTERVAL: 169 MS
EKG Q-T INTERVAL: 411 MS
EKG QRS DURATION: 89 MS
EKG QTC CALCULATION (BAZETT): 441 MS
EKG R AXIS: -8 DEGREES
EKG T AXIS: 1 DEGREES
EKG VENTRICULAR RATE: 69 BPM
GLOBULIN SER CALC-MCNC: 5.3 G/DL (ref 2.8–4.5)
GLUCOSE SERPL-MCNC: 196 MG/DL (ref 65–100)
POTASSIUM SERPL-SCNC: 4 MMOL/L (ref 3.5–5.1)
PROT SERPL-MCNC: 7.8 G/DL (ref 6.3–8.2)
SODIUM SERPL-SCNC: 141 MMOL/L (ref 133–143)

## 2023-02-08 PROCEDURE — 93005 ELECTROCARDIOGRAM TRACING: CPT | Performed by: EMERGENCY MEDICINE

## 2023-02-08 PROCEDURE — 71045 X-RAY EXAM CHEST 1 VIEW: CPT

## 2023-02-08 RX ORDER — ONDANSETRON 4 MG/1
4 TABLET, ORALLY DISINTEGRATING ORAL 3 TIMES DAILY PRN
Qty: 21 TABLET | Refills: 0 | Status: SHIPPED | OUTPATIENT
Start: 2023-02-08

## 2023-02-08 ASSESSMENT — ENCOUNTER SYMPTOMS
FACIAL SWELLING: 0
VOICE CHANGE: 0
ABDOMINAL PAIN: 0
SHORTNESS OF BREATH: 0
CHEST TIGHTNESS: 0
COUGH: 0
BACK PAIN: 0
VOMITING: 1
SORE THROAT: 0
EYE PAIN: 0
TROUBLE SWALLOWING: 0
NAUSEA: 1

## 2023-02-08 ASSESSMENT — PAIN - FUNCTIONAL ASSESSMENT: PAIN_FUNCTIONAL_ASSESSMENT: NONE - DENIES PAIN

## 2023-02-08 NOTE — ED TRIAGE NOTES
Patient arrives via EMS from home after having an episode of vomiting and syncopal episode. EMS states that patient was awake upon arrival but did have an episode in the ambulance where she vomited and passed out. EMS states patient was hypotensive. Patient reported that she started not feeling well a few hours prior to the happening. EMS . Ems gave 8mg zofran, 500ml NS in route.

## 2023-02-08 NOTE — ED PROVIDER NOTES
Emergency Department Provider Note                   PCP:                Chai Roland DO               Age: 78 y.o. Sex: female       ICD-10-CM    1. Nausea and vomiting, unspecified vomiting type  R11.2           DISPOSITION Decision To Discharge 02/08/2023 01:24:37 AM       Medical Decision Making  82-year-old female presents emerged department via EMS with chief complaint of nausea. Patient reports having acute onset of 2 episodes of nausea that started earlier tonight. She denies having any chest pain shortness of breath or abdominal pain or urinary symptoms. Patient states that she had received fluids and Zofran in route per EMS and her symptoms have resolved here. Vital signs are reviewed. Patient is clinically nontoxic in appearance she is actually asymptomatic here in the emergency department. Basic lab work had been obtained, CBC showed no elevation of white blood cell count, hemoglobin 9.2 platelets 571, CMP showed a glucose of 196 with no elevation anion gap creatinine 1.4 chest x-ray interpreted by myself and agrees with the radiologist showed no evidence of any acute process. EKG showed no signs of acute ischemia at this point patient remains clinically stable can be discharged back home with PCP follow-up. Patient is stable and discharged abdominal examination    Amount and/or Complexity of Data Reviewed  Labs: ordered. Radiology: ordered. ECG/medicine tests: ordered. Risk  Prescription drug management. Complexity of Problem:1 acute or chronic illness that poses a threat to life or bodily function. (5)  The patients assessment required an independent historian: I spoke with the paramedic. I have conducted an independent ordering and review of Labs. I have conducted an independent ordering and review of EKG. I have conducted an independent ordering and review of X-rays.   I have reviewed records from an external source: ED records from outside this hospital.  I have reviewed records from an external source: provider visit notes from PCP. I have reviewed records from an external source: provider visit notes from outside specialist.  Considerations: Shared decision making was utilized in the care of this patient. Considerations: The following labs and/or imaging studies were considered but not ordered: ct abdomen and pelvis   Social determinant affecting care: none  Evidence based risk calculation performed: none  I discussed case with patient  Dispo home with pcp follow up          Orders Placed This Encounter   Procedures    XR CHEST PORTABLE    CBC with Auto Differential    Comprehensive Metabolic Panel    Cardiac Monitor - ED Only    Continuous Pulse Oximetry    Orthostatic blood pressure and pulse    EKG 12 Lead        Medications - No data to display    New Prescriptions    ONDANSETRON (ZOFRAN-ODT) 4 MG DISINTEGRATING TABLET    Take 1 tablet by mouth 3 times daily as needed for Nausea or Vomiting        Dipika Arechiga is a 78 y.o. female who presents to the Emergency Department with chief complaint of    Chief Complaint   Patient presents with    Emesis      62-year-old female presents emerged department via EMS with chief complaint of nausea. Patient reports having acute onset of 2 episodes of nausea that started earlier tonight. She denies having any chest pain shortness of breath or abdominal pain or urinary symptoms. Patient states that she had received fluids and Zofran in route per EMS and her symptoms have resolved here. Review of Systems   Constitutional:  Negative for activity change, chills and fever. HENT:  Negative for dental problem, drooling, facial swelling, sore throat, trouble swallowing and voice change. Eyes:  Negative for pain. Respiratory:  Negative for cough, chest tightness and shortness of breath. Cardiovascular:  Negative for chest pain and palpitations. Gastrointestinal:  Positive for nausea and vomiting.  Negative for abdominal pain. Endocrine: Negative for polydipsia. Genitourinary:  Negative for difficulty urinating, dysuria and hematuria. Musculoskeletal:  Negative for back pain and neck pain. Skin:  Negative for rash and wound. Neurological:  Negative for dizziness, seizures, facial asymmetry, speech difficulty, numbness and headaches. Psychiatric/Behavioral:  Negative for agitation and behavioral problems. Past Medical History:   Diagnosis Date    Acute cholecystitis 11/6/2018    Acute pyelonephritis 1/14/2017    CAD (coronary artery disease)     [facility] 4/10/2019    COVID-19 virus infection 6/29/2020    Diabetes ([facility]     Hypertension     Other ill-defined conditions(799.89)     choelsterol    Stroke [facility])         Past Surgical History:   Procedure Laterality Date    OK UNLISTED PROCEDURE CARDIAC SURGERY      stented - doesn't know date        Family History   Problem Relation Age of Onset    Diabetes Father     Hypertension Father     High Cholesterol Father     Diabetes Mother     Heart Disease Mother     Hypertension Mother     High Cholesterol Mother         Social History     Socioeconomic History    Marital status: Legally    Tobacco Use    Smoking status: Never    Smokeless tobacco: Never   Substance and Sexual Activity    Alcohol use: Never    Drug use: Never        Allergies: Codeine, Sulfa antibiotics, and Sulfamethoxazole-trimethoprim    Previous Medications    ACETAMINOPHEN (TYLENOL) 500 MG TABLET    Take 500 mg by mouth every 6 hours as needed    AMLODIPINE (NORVASC) 5 MG TABLET    Take 5 mg by mouth daily    ASPIRIN 81 MG EC TABLET    Take 81 mg by mouth 2 times daily    ATORVASTATIN (LIPITOR) 80 MG TABLET    atorvastatin 80 mg tablet   Take 1 tablet every day by oral route for 90 days. CARVEDILOL (COREG) 6.25 MG TABLET    carvedilol 6.25 mg tablet   Take 1 tablet twice a day by oral route for 90 days.     CEPHALEXIN (KEFLEX) 250 MG CAPSULE    Take 1 capsule by mouth daily    CLOPIDOGREL (PLAVIX) 75 MG TABLET    clopidogrel 75 mg tablet   TAKE 1 TABLET DAILY. FERROUS SULFATE (IRON 325) 325 (65 FE) MG TABLET    Take 1 tablet by mouth 2 times daily    HYDROCODONE HOMATROPINE (HYCODAN) 5-1.5 MG/5ML SOLUTION    Take 5 mLs by mouth 4 times daily as needed. INSULIN GLARGINE (LANTUS) 100 UNIT/ML INJECTION VIAL    Inject 13 Units into the skin    LORATADINE (CLARITIN) 10 MG TABLET    loratadine 10 mg tablet   Take 1 tablet every day by oral route. METFORMIN (GLUCOPHAGE) 1000 MG TABLET    Take 1 tablet by mouth daily (with breakfast)    ONDANSETRON (ZOFRAN-ODT) 4 MG DISINTEGRATING TABLET    Take 4 mg by mouth 3 times daily as needed    POTASSIUM CHLORIDE (KLOR-CON M) 20 MEQ EXTENDED RELEASE TABLET    Take 1 tablet by mouth 2 times daily for 5 days    SITAGLIPTIN (JANUVIA) 100 MG TABLET    Januvia 100 mg tablet   Take 1 tablet every day by oral route for 90 days. TAMSULOSIN (FLOMAX) 0.4 MG CAPSULE    Take 1 capsule by mouth every evening    VITAMIN D (CHOLECALCIFEROL) 25 MCG (1000 UT) TABS TABLET    Take 1,000 Units by mouth daily        Vitals signs and nursing note reviewed. Patient Vitals for the past 4 hrs:   Temp Pulse Resp BP SpO2   02/08/23 0050 98.9 °F (37.2 °C) 67 16 120/62 99 %          Physical Exam  Vitals and nursing note reviewed. Constitutional:       General: She is not in acute distress. Appearance: Normal appearance. She is obese. She is not ill-appearing. HENT:      Head: Normocephalic and atraumatic. Right Ear: Tympanic membrane normal.      Left Ear: Tympanic membrane normal.      Mouth/Throat:      Mouth: Mucous membranes are moist.      Pharynx: Oropharynx is clear. Eyes:      Extraocular Movements: Extraocular movements intact. Pupils: Pupils are equal, round, and reactive to light. Cardiovascular:      Rate and Rhythm: Normal rate and regular rhythm. Heart sounds: No murmur heard.   Pulmonary:      Effort: No respiratory distress. Breath sounds: No wheezing or rhonchi. Abdominal:      Palpations: Abdomen is soft. There is no mass. Tenderness: There is no abdominal tenderness. There is no guarding. Comments: Nonsurgical abdomen no signs of peritonitis rebound or guarding   Musculoskeletal:         General: No swelling or tenderness. Cervical back: Normal range of motion and neck supple. No rigidity or tenderness. Skin:     General: Skin is warm and dry. Capillary Refill: Capillary refill takes less than 2 seconds. Neurological:      General: No focal deficit present. Mental Status: She is alert and oriented to person, place, and time. Mental status is at baseline. Psychiatric:         Mood and Affect: Mood normal.         Behavior: Behavior normal.        Procedures    ED EKG Interpretation  EKG was interpreted in the absence of a cardiologist.    Normal sinus rhythm with a ventricular rate of 69 bpm, parable 169, QRS 89, QTc 441, no ST segment elevation or depression noted no signs of acute ischemia        Results for orders placed or performed during the hospital encounter of 02/08/23   XR CHEST PORTABLE    Narrative    EXAMINATION: Chest one view. DATE: 2/8/2023. COMPARISON: 12/9/2022. CLINICAL HISTORY: Syncope. FINDINGS:    The lung volumes are low. The lungs otherwise are likely clear given the low   lung volumes. The cardiomediastinal silhouette and the pulmonary vessels are within normal   limits. Impression    No acute cardiopulmonary process.      Halima Billingsley D.O.   2/8/2023 12:53:00 AM   CBC with Auto Differential   Result Value Ref Range    WBC 9.7 4.3 - 11.1 K/uL    RBC 3.20 (L) 4.05 - 5.2 M/uL    Hemoglobin 9.2 (L) 11.7 - 15.4 g/dL    Hematocrit 28.9 (L) 35.8 - 46.3 %    MCV 90.3 82 - 102 FL    MCH 28.8 26.1 - 32.9 PG    MCHC 31.8 31.4 - 35.0 g/dL    RDW 15.7 (H) 11.9 - 14.6 %    Platelets 007 870 - 047 K/uL    MPV 8.5 (L) 9.4 - 12.3 FL    nRBC 0.00 0.0 - 0.2 K/uL    Differential Type AUTOMATED      Seg Neutrophils 53 43 - 78 %    Lymphocytes 34 13 - 44 %    Monocytes 11 4.0 - 12.0 %    Eosinophils % 2 0.5 - 7.8 %    Basophils 0 0.0 - 2.0 %    Immature Granulocytes 0 0.0 - 5.0 %    Segs Absolute 5.1 1.7 - 8.2 K/UL    Absolute Lymph # 3.3 0.5 - 4.6 K/UL    Absolute Mono # 1.0 0.1 - 1.3 K/UL    Absolute Eos # 0.2 0.0 - 0.8 K/UL    Basophils Absolute 0.0 0.0 - 0.2 K/UL    Absolute Immature Granulocyte 0.0 0.0 - 0.5 K/UL   Comprehensive Metabolic Panel   Result Value Ref Range    Sodium 141 133 - 143 mmol/L    Potassium 4.0 3.5 - 5.1 mmol/L    Chloride 110 101 - 110 mmol/L    CO2 28 21 - 32 mmol/L    Anion Gap 3 2 - 11 mmol/L    Glucose 196 (H) 65 - 100 mg/dL    BUN 16 8 - 23 MG/DL    Creatinine 1.40 (H) 0.6 - 1.0 MG/DL    Est, Glom Filt Rate 39 (L) >60 ml/min/1.73m2    Calcium 8.6 8.3 - 10.4 MG/DL    Total Bilirubin 0.6 0.2 - 1.1 MG/DL    ALT 33 12 - 65 U/L    AST 22 15 - 37 U/L    Alk Phosphatase 68 50 - 136 U/L    Total Protein 7.8 6.3 - 8.2 g/dL    Albumin 2.5 (L) 3.2 - 4.6 g/dL    Globulin 5.3 (H) 2.8 - 4.5 g/dL    Albumin/Globulin Ratio 0.5 0.4 - 1.6          XR CHEST PORTABLE   Final Result      No acute cardiopulmonary process. Cortney Fleming D.O.    2/8/2023 12:53:00 AM                            Voice dictation software was used during the making of this note. This software is not perfect and grammatical and other typographical errors may be present. This note has not been completely proofread for errors.      Diana De La Garza,   02/08/23 0126

## 2023-02-08 NOTE — DISCHARGE INSTRUCTIONS
There is no evidence of any emergencies seen on your EKG no signs of a heart attack no abnormalities in your blood work. Please follow-up with your primary care doctor as needed. I did write you for some as needed nausea medicine at home if you have return of your nausea.

## 2023-02-14 ENCOUNTER — OFFICE VISIT (OUTPATIENT)
Dept: UROLOGY | Age: 78
End: 2023-02-14
Payer: MEDICARE

## 2023-02-14 DIAGNOSIS — R33.9 INCOMPLETE BLADDER EMPTYING: Primary | ICD-10-CM

## 2023-02-14 DIAGNOSIS — N31.9 NEUROGENIC BLADDER: ICD-10-CM

## 2023-02-14 DIAGNOSIS — N39.0 RECURRENT UTI: ICD-10-CM

## 2023-02-14 PROCEDURE — G8400 PT W/DXA NO RESULTS DOC: HCPCS | Performed by: NURSE PRACTITIONER

## 2023-02-14 PROCEDURE — G8484 FLU IMMUNIZE NO ADMIN: HCPCS | Performed by: NURSE PRACTITIONER

## 2023-02-14 PROCEDURE — G8417 CALC BMI ABV UP PARAM F/U: HCPCS | Performed by: NURSE PRACTITIONER

## 2023-02-14 PROCEDURE — 1090F PRES/ABSN URINE INCON ASSESS: CPT | Performed by: NURSE PRACTITIONER

## 2023-02-14 PROCEDURE — G8428 CUR MEDS NOT DOCUMENT: HCPCS | Performed by: NURSE PRACTITIONER

## 2023-02-14 PROCEDURE — 3017F COLORECTAL CA SCREEN DOC REV: CPT | Performed by: NURSE PRACTITIONER

## 2023-02-14 PROCEDURE — 99214 OFFICE O/P EST MOD 30 MIN: CPT | Performed by: NURSE PRACTITIONER

## 2023-02-14 PROCEDURE — 1036F TOBACCO NON-USER: CPT | Performed by: NURSE PRACTITIONER

## 2023-02-14 PROCEDURE — 1123F ACP DISCUSS/DSCN MKR DOCD: CPT | Performed by: NURSE PRACTITIONER

## 2023-02-14 RX ORDER — NITROFURANTOIN 25; 75 MG/1; MG/1
100 CAPSULE ORAL 2 TIMES DAILY
Qty: 20 CAPSULE | Refills: 0 | Status: SHIPPED | OUTPATIENT
Start: 2023-02-14 | End: 2023-02-24

## 2023-02-14 RX ORDER — TAMSULOSIN HYDROCHLORIDE 0.4 MG/1
0.4 CAPSULE ORAL EVERY EVENING
Qty: 90 CAPSULE | Refills: 3 | Status: SHIPPED | OUTPATIENT
Start: 2023-02-14

## 2023-02-14 RX ORDER — FLUCONAZOLE 150 MG/1
TABLET ORAL
Qty: 2 TABLET | Refills: 0 | Status: SHIPPED | OUTPATIENT
Start: 2023-02-14

## 2023-02-14 ASSESSMENT — ENCOUNTER SYMPTOMS: NAUSEA: 0

## 2023-02-14 NOTE — PROGRESS NOTES
[facility]  [address]    [address], [address] [facility]  [phone]          Kirsten Lopez  : 1945    Chief Complaint   Patient presents with    Follow-up          HPI     Kirsten Lopez is a 78 y.o. female  Returns today for follow-up on incomplete bladder emptying and recurrent urinary infections. Patient continues to use Flomax 0.4 mg daily. Her PVRs have been running between 150 and 200 mL with Flomax. Prior to this she was averaging about 350 to 400 mL. She feels that she is emptying the bladder appropriately today we are unable to do PVR due to immobility today. The son is accompanying her today. He says that at times the urine looks cloudy. It does not have an odor. He wonders if urinary infection is present. No fever or chills of been reported no hematuria. Copied from previous note_  Initially seen for hospital follow-up after being admitted for sepsis due to E. coli with encephalopathy. Urinary retention was also found. The daughter had reported that the patient had had weakness for little over a week. The confusion however started the day of her ER visit. Cath urine in the ER revealed large leukocytes she was admitted for UTI and IV antibiotic therapy. Sepsis 2/2 E coli complicated UTI - 2/2 urinary retention from OAB. Ucx >100K E coli CFU/ml. S/p  Rocephin IV x 5 doses. Complete course of abx with keflex at home. Positive blood culture - 1/2 BC sets GPC in aerobic bottle on , coag neg staph suggestive of skin contaminant. . Repeat BC  NGTD. S/p vanc x 1 dose. Patient is here now for evaluation. She says prior to this hospital admission she has had a couple of urinary infections. The last infection was 2 years ago. Patient reports she voids without difficulty. She has never had retention issues. She is accompanied by her granddaughter today. There is no fever chills or hematuria since being home.   The urine overall has been clear. 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 Other PMHx obesity, CAD s/p MICKI to RCA 2007, T2DM, HTN, HLD, anemia    Past Medical History:   Diagnosis Date    Acute cholecystitis 11/6/2018    Acute pyelonephritis 1/14/2017    CAD (coronary artery disease)     [facility] 4/10/2019    COVID-19 virus infection 6/29/2020    Diabetes ([facility]     Hypertension     Other ill-defined conditions(799.89)     choelsterol    Stroke [facility])      Past Surgical History:   Procedure Laterality Date    [place] UNLISTED PROCEDURE CARDIAC SURGERY      stented - doesn't know date     Current Outpatient Medications   Medication Sig Dispense Refill    nitrofurantoin, macrocrystal-monohydrate, (MACROBID) 100 MG capsule Take 1 capsule by mouth 2 times daily for 10 days 20 capsule 0    fluconazole (DIFLUCAN) 150 MG tablet Take daily x 2 days after antibiotic is complete 2 tablet 0    tamsulosin (FLOMAX) 0.4 MG capsule Take 1 capsule by mouth every evening 90 capsule 3    ondansetron (ZOFRAN-ODT) 4 MG disintegrating tablet Take 1 tablet by mouth 3 times daily as needed for Nausea or Vomiting 21 tablet 0    cephALEXin (KEFLEX) 250 MG capsule Take 1 capsule by mouth daily 90 capsule 3    ferrous sulfate (IRON 325) 325 (65 Fe) MG tablet Take 1 tablet by mouth 2 times daily 60 tablet 0    metFORMIN (GLUCOPHAGE) 1000 MG tablet Take 1 tablet by mouth daily (with breakfast) 60 tablet 3    tamsulosin (FLOMAX) 0.4 MG capsule Take 1 capsule by mouth every evening 30 capsule 11    acetaminophen (TYLENOL) 500 MG tablet Take 500 mg by mouth every 6 hours as needed      amLODIPine (NORVASC) 5 MG tablet Take 5 mg by mouth daily      aspirin 81 MG EC tablet Take 81 mg by mouth 2 times daily      atorvastatin (LIPITOR) 80 MG tablet atorvastatin 80 mg tablet   Take 1 tablet every day by oral route for 90 days.       carvedilol (COREG) 6.25 MG tablet carvedilol 6.25 mg tablet   Take 1 tablet twice a day by oral route for 90 days. vitamin D (CHOLECALCIFEROL) 25 MCG (1000 UT) TABS tablet Take 1,000 Units by mouth daily      clopidogrel (PLAVIX) 75 MG tablet clopidogrel 75 mg tablet   TAKE 1 TABLET DAILY. HYDROcodone homatropine (HYCODAN) 5-1.5 MG/5ML solution Take 5 mLs by mouth 4 times daily as needed. insulin glargine (LANTUS) 100 UNIT/ML injection vial Inject 13 Units into the skin      loratadine (CLARITIN) 10 MG tablet loratadine 10 mg tablet   Take 1 tablet every day by oral route. ondansetron (ZOFRAN-ODT) 4 MG disintegrating tablet Take 4 mg by mouth 3 times daily as needed      SITagliptin (JANUVIA) 100 MG tablet Januvia 100 mg tablet   Take 1 tablet every day by oral route for 90 days. potassium chloride (KLOR-CON M) 20 MEQ extended release tablet Take 1 tablet by mouth 2 times daily for 5 days 10 tablet 0     No current facility-administered medications for this visit.      Allergies   Allergen Reactions    Codeine Nausea And Vomiting    Sulfa Antibiotics Nausea And Vomiting and Hives     Daughter reports but unsure of reaction      Sulfamethoxazole-Trimethoprim Nausea And Vomiting     Social History     Socioeconomic History    Marital status: Legally      Spouse name: Not on file    Number of children: Not on file    Years of education: Not on file    Highest education level: Not on file   Occupational History    Not on file   Tobacco Use    Smoking status: Never    Smokeless tobacco: Never   Substance and Sexual Activity    Alcohol use: Never    Drug use: Never    Sexual activity: Not on file   Other Topics Concern    Not on file   Social History Narrative    Not on file     Social Determinants of Health     Financial Resource Strain: Not on file   Food Insecurity: Not on file   Transportation Needs: Not on file   Physical Activity: Not on file   Stress: Not on file   Social Connections: Not on file   Intimate Partner Violence: Not on file   Housing Stability: Not on file Family History   Problem Relation Age of Onset    Diabetes Father     Hypertension Father     High Cholesterol Father     Diabetes Mother     Heart Disease Mother     Hypertension Mother     High Cholesterol Mother        Review of Systems  Constitutional:   Negative for fever. GI:  Negative for nausea. Genitourinary:  Negative for flank pain. We will to give specimen today. PHYSICAL EXAM    GENERAL: No acute distress, Awake, Alert, Oriented X 3, Gait normal  ABDOMEN: soft, LARGE and obese  SKIN: No rash, no erythema, no lacerations or abrasions, no ecchymosis  MUSCULOSKELETAL -right sided rigidity due to previous CVA. Patient in wheelchair due to inability to ambulate      Assessment and Plan    ICD-10-CM    1. Incomplete bladder emptying  R33.9       2. Neurogenic bladder  N31.9       3. Recurrent UTI  N39.0         Incomplete bladder emptying/neurogenic bladder-  Continue tamsulosin 0.4 mg p.o. daily. History of recurrent urinary infection-  Macrobid 100 mg twice a day for 10 days. She can follow this with the Diflucan 150 mg and take 1 daily for 2 days. They will bring me a urine specimen a week after completion of medication therapy. GILDARDO Bay NP, Dr. is supervising physician today and he approves plan of care. Return for specimen in 2 week, OV with me in 3 months. Elements of this note have been dictated using speech recognition software. Although reviewed, errors of speech recognition may have occurred.

## 2023-02-28 ENCOUNTER — OFFICE VISIT (OUTPATIENT)
Dept: UROLOGY | Age: 78
End: 2023-02-28
Payer: MEDICARE

## 2023-02-28 DIAGNOSIS — N39.0 RECURRENT UTI: Primary | ICD-10-CM

## 2023-02-28 LAB
BILIRUBIN, URINE, POC: NEGATIVE
BLOOD URINE, POC: ABNORMAL
GLUCOSE URINE, POC: NEGATIVE
KETONES, URINE, POC: NEGATIVE
LEUKOCYTE ESTERASE, URINE, POC: NEGATIVE
NITRITE, URINE, POC: NEGATIVE
PH, URINE, POC: 8.5 (ref 4.6–8)
PROTEIN,URINE, POC: >=300
SPECIFIC GRAVITY, URINE, POC: 1.02 (ref 1–1.03)
URINALYSIS CLARITY, POC: ABNORMAL
URINALYSIS COLOR, POC: ABNORMAL
UROBILINOGEN, POC: ABNORMAL

## 2023-02-28 PROCEDURE — 81003 URINALYSIS AUTO W/O SCOPE: CPT | Performed by: NURSE PRACTITIONER

## 2023-02-28 NOTE — PROGRESS NOTES
Reason for collection: recheck after abx  Patient #: [phone]  Pharmacy: [facility]           UA - Dipstick  Results for orders placed or performed in visit on 02/28/23   AMB POC URINALYSIS DIP STICK AUTO W/O MICRO   Result Value Ref Range    Color (UA POC)      Clarity (UA POC)      Glucose, Urine, POC Negative Negative    Bilirubin, Urine, POC Negative Negative    KETONES, Urine, POC Negative Negative    Specific Gravity, Urine, POC 1.020 1.001 - 1.035    Blood (UA POC) Small Negative    pH, Urine, POC 8.5 (A) 4.6 - 8.0    Protein, Urine, POC >=300 Negative    Urobilinogen, POC 0.2 mg/dL     Nitrite, Urine, POC Negative Negative    Leukocyte Esterase, Urine, POC Negative Negative             Requested Prescriptions      No prescriptions requested or ordered in this encounter     Plan    Diagnosis Orders   1. Recurrent UTI  AMB POC URINALYSIS DIP STICK AUTO W/O MICRO        Urine is clear.

## 2023-11-23 ENCOUNTER — HOSPITAL ENCOUNTER (EMERGENCY)
Age: 78
Discharge: HOME | End: 2023-11-23
Attending: STUDENT IN AN ORGANIZED HEALTH CARE EDUCATION/TRAINING PROGRAM

## 2023-11-23 VITALS
DIASTOLIC BLOOD PRESSURE: 53 MMHG | BODY MASS INDEX: 33.13 KG/M2 | HEIGHT: 62 IN | HEART RATE: 62 BPM | SYSTOLIC BLOOD PRESSURE: 125 MMHG | WEIGHT: 180 LBS | OXYGEN SATURATION: 100 % | TEMPERATURE: 98.1 F | RESPIRATION RATE: 16 BRPM

## 2023-11-23 DIAGNOSIS — N30.00 ACUTE CYSTITIS WITHOUT HEMATURIA: Primary | ICD-10-CM

## 2023-11-23 LAB
ALBUMIN SERPL-MCNC: 2.5 G/DL (ref 3.2–4.6)
ALBUMIN/GLOB SERPL: 0.6 (ref 0.4–1.6)
ALP SERPL-CCNC: 61 U/L (ref 50–136)
ALT SERPL-CCNC: 26 U/L (ref 12–65)
ANION GAP SERPL CALC-SCNC: 7 MMOL/L (ref 2–11)
APPEARANCE UR: ABNORMAL
AST SERPL-CCNC: 23 U/L (ref 15–37)
BACTERIA URNS QL MICRO: ABNORMAL /HPF
BASOPHILS # BLD: 0.1 K/UL (ref 0–0.2)
BASOPHILS NFR BLD: 1 % (ref 0–2)
BILIRUB SERPL-MCNC: 0.5 MG/DL (ref 0.2–1.1)
BILIRUB UR QL: NEGATIVE
BUN SERPL-MCNC: 16 MG/DL (ref 8–23)
CALCIUM SERPL-MCNC: 8.2 MG/DL (ref 8.3–10.4)
CASTS URNS QL MICRO: 0 /LPF
CHLORIDE SERPL-SCNC: 109 MMOL/L (ref 101–110)
CO2 SERPL-SCNC: 26 MMOL/L (ref 21–32)
COLOR UR: ABNORMAL
CREAT SERPL-MCNC: 1.36 MG/DL (ref 0.6–1)
CRYSTALS URNS QL MICRO: 0 /LPF
DIFFERENTIAL METHOD BLD: ABNORMAL
EOSINOPHIL # BLD: 0.4 K/UL (ref 0–0.8)
EOSINOPHIL NFR BLD: 5 % (ref 0.5–7.8)
EPI CELLS #/AREA URNS HPF: ABNORMAL /HPF
ERYTHROCYTE [DISTWIDTH] IN BLOOD BY AUTOMATED COUNT: 15.5 % (ref 11.9–14.6)
GLOBULIN SER CALC-MCNC: 4.4 G/DL (ref 2.8–4.5)
GLUCOSE SERPL-MCNC: 130 MG/DL (ref 65–100)
GLUCOSE UR STRIP.AUTO-MCNC: NEGATIVE MG/DL
HCT VFR BLD AUTO: 26.7 % (ref 35.8–46.3)
HGB BLD-MCNC: 8.4 G/DL (ref 11.7–15.4)
HGB UR QL STRIP: ABNORMAL
IMM GRANULOCYTES # BLD AUTO: 0 K/UL (ref 0–0.5)
IMM GRANULOCYTES NFR BLD AUTO: 0 % (ref 0–5)
KETONES UR QL STRIP.AUTO: NEGATIVE MG/DL
LACTATE SERPL-SCNC: 1.2 MMOL/L (ref 0.4–2)
LEUKOCYTE ESTERASE UR QL STRIP.AUTO: ABNORMAL
LYMPHOCYTES # BLD: 3.9 K/UL (ref 0.5–4.6)
LYMPHOCYTES NFR BLD: 44 % (ref 13–44)
MCH RBC QN AUTO: 27.8 PG (ref 26.1–32.9)
MCHC RBC AUTO-ENTMCNC: 31.5 G/DL (ref 31.4–35)
MCV RBC AUTO: 88.4 FL (ref 82–102)
MONOCYTES # BLD: 0.7 K/UL (ref 0.1–1.3)
MONOCYTES NFR BLD: 8 % (ref 4–12)
MUCOUS THREADS URNS QL MICRO: 0 /LPF
NEUTS SEG # BLD: 3.8 K/UL (ref 1.7–8.2)
NEUTS SEG NFR BLD: 43 % (ref 43–78)
NITRITE UR QL STRIP.AUTO: NEGATIVE
NRBC # BLD: 0 K/UL (ref 0–0.2)
PH UR STRIP: 6 (ref 5–9)
PLATELET # BLD AUTO: 324 K/UL (ref 150–450)
PMV BLD AUTO: 8.7 FL (ref 9.4–12.3)
POTASSIUM SERPL-SCNC: 3.3 MMOL/L (ref 3.5–5.1)
PROCALCITONIN SERPL-MCNC: <0.05 NG/ML (ref 0–0.49)
PROT SERPL-MCNC: 6.9 G/DL (ref 6.3–8.2)
PROT UR STRIP-MCNC: 30 MG/DL
RBC # BLD AUTO: 3.02 M/UL (ref 4.05–5.2)
RBC #/AREA URNS HPF: ABNORMAL /HPF
SODIUM SERPL-SCNC: 142 MMOL/L (ref 133–143)
SP GR UR REFRACTOMETRY: 1.01 (ref 1–1.02)
URINE CULTURE IF INDICATED: ABNORMAL
UROBILINOGEN UR QL STRIP.AUTO: 0.2 EU/DL (ref 0.2–1)
WBC # BLD AUTO: 8.9 K/UL (ref 4.3–11.1)
WBC URNS QL MICRO: >100 /HPF
YEAST URNS QL MICRO: ABNORMAL

## 2023-11-23 PROCEDURE — 84145 PROCALCITONIN (PCT): CPT

## 2023-11-23 PROCEDURE — 83605 ASSAY OF LACTIC ACID: CPT

## 2023-11-23 PROCEDURE — 80053 COMPREHEN METABOLIC PANEL: CPT

## 2023-11-23 PROCEDURE — 6370000000 HC RX 637 (ALT 250 FOR IP): Performed by: STUDENT IN AN ORGANIZED HEALTH CARE EDUCATION/TRAINING PROGRAM

## 2023-11-23 PROCEDURE — 99284 EMERGENCY DEPT VISIT MOD MDM: CPT

## 2023-11-23 PROCEDURE — 2580000003 HC RX 258: Performed by: STUDENT IN AN ORGANIZED HEALTH CARE EDUCATION/TRAINING PROGRAM

## 2023-11-23 PROCEDURE — 96361 HYDRATE IV INFUSION ADD-ON: CPT

## 2023-11-23 PROCEDURE — 81001 URINALYSIS AUTO W/SCOPE: CPT

## 2023-11-23 PROCEDURE — 96360 HYDRATION IV INFUSION INIT: CPT

## 2023-11-23 PROCEDURE — 87040 BLOOD CULTURE FOR BACTERIA: CPT

## 2023-11-23 PROCEDURE — 85025 COMPLETE CBC W/AUTO DIFF WBC: CPT

## 2023-11-23 PROCEDURE — 87086 URINE CULTURE/COLONY COUNT: CPT

## 2023-11-23 RX ORDER — 0.9 % SODIUM CHLORIDE 0.9 %
1000 INTRAVENOUS SOLUTION INTRAVENOUS
Status: COMPLETED | OUTPATIENT
Start: 2023-11-23 | End: 2023-11-23

## 2023-11-23 RX ORDER — CEPHALEXIN 500 MG/1
500 CAPSULE ORAL 3 TIMES DAILY
Qty: 21 CAPSULE | Refills: 0 | Status: SHIPPED | OUTPATIENT
Start: 2023-11-23 | End: 2023-11-30

## 2023-11-23 RX ORDER — CEPHALEXIN 500 MG/1
500 CAPSULE ORAL
Status: COMPLETED | OUTPATIENT
Start: 2023-11-23 | End: 2023-11-23

## 2023-11-23 RX ORDER — CIPROFLOXACIN 500 MG/1
TABLET, FILM COATED ORAL
COMMUNITY
Start: 2023-11-16

## 2023-11-23 RX ADMIN — SODIUM CHLORIDE 1000 ML: 9 INJECTION, SOLUTION INTRAVENOUS at 12:59

## 2023-11-23 RX ADMIN — CEPHALEXIN 500 MG: 500 CAPSULE ORAL at 15:17

## 2023-11-23 ASSESSMENT — PAIN DESCRIPTION - DESCRIPTORS: DESCRIPTORS: ACHING

## 2023-11-23 ASSESSMENT — PAIN SCALES - GENERAL: PAINLEVEL_OUTOF10: 8

## 2023-11-23 ASSESSMENT — LIFESTYLE VARIABLES
HOW OFTEN DO YOU HAVE A DRINK CONTAINING ALCOHOL: NEVER
HOW MANY STANDARD DRINKS CONTAINING ALCOHOL DO YOU HAVE ON A TYPICAL DAY: PATIENT DOES NOT DRINK

## 2023-11-23 ASSESSMENT — PAIN DESCRIPTION - LOCATION: LOCATION: BACK

## 2023-11-23 ASSESSMENT — PAIN DESCRIPTION - ORIENTATION: ORIENTATION: LOWER

## 2023-11-23 ASSESSMENT — PAIN - FUNCTIONAL ASSESSMENT: PAIN_FUNCTIONAL_ASSESSMENT: 0-10

## 2023-11-23 NOTE — ED PROVIDER NOTES
Emergency Department Provider Note       PCP: No primary care provider on file. Age: 70 y.o. Sex: female     DISPOSITION Decision To Discharge 11/23/2023 03:28:07 PM       ICD-10-CM    1. Acute cystitis without hematuria  N30.00           Medical Decision Making     Complexity of Problems Addressed:  Complexity of Problem: 1 acute, uncomplicated illness or injury. Data Reviewed and Analyzed:  I independently ordered and reviewed each unique test.  I reviewed external records: previous lab results from outside ED. Discussion of management or test interpretation. 53-year-old female history of chronic mini tract infection presents emergency department complaining of urinary frequency and episodes of incontinence. History of UTIs that presented with similar symptoms. She denies any fever. Reports having been admitted in the past for sepsis from urinary tract infections. Patient with baseline right-sided weakness from prior CVA. Lab work obtained shows normal white count, hemoglobin is 8.4 which is higher than her previous hemoglobin. Lactic acid and procalcitonin normal, baseline kidney dysfunction creatinine 1.36, patient given IV fluid. Initially patient did have low blood pressure, this improved after IV fluid. Urinalysis does show evidence of urinary tract infection. Urine culture ordered. Will give first dose of Keflex here in the ER will give prescription for 7 days. Outpatient follow-up return precautions given. She voiced understanding and agreement. Risk of Complications and/or Morbidity of Patient Management:  Prescription drug management performed and Shared medical decision making was utilized in creating the patients health plan today. History      53-year-old female history of chronic mini tract infection presents emergency department complaining of urinary frequency and episodes of incontinence. History of UTIs that presented with similar symptoms.   She denies

## 2023-11-23 NOTE — ED NOTES
Patient here with generalized fatigue, back pain and urinary frequency.      Carmen Isaac RN  11/23/23 3216

## 2023-11-23 NOTE — ED TRIAGE NOTES
Patient presents with daughter who stated patient has been weak for the past \"couple days\" admits to lower back pain with frequency and incontinence. Denies any dysuria, abdominal pain, nausea/vomiting, fever/chills.  Patient recently diagnosed with UTI (last week) currently on Cipro

## 2023-11-23 NOTE — DISCHARGE INSTRUCTIONS
Take antibiotics as prescribed. Follow-up with primary care physician within 1 week. Return to the ER for worsening or worrisome symptoms. Check your blood pressure before taking your antihypertensive medication. Your blood pressure is less than 140/90, do not take your blood pressure medication. Continue to stay orally hydrated with clear liquids.

## 2023-11-26 LAB
BACTERIA SPEC CULT: NORMAL
SERVICE CMNT-IMP: NORMAL
SERVICE CMNT-IMP: NORMAL

## 2023-11-28 LAB
BACTERIA SPEC CULT: NORMAL
SERVICE CMNT-IMP: NORMAL